# Patient Record
Sex: FEMALE | Race: WHITE | Employment: UNEMPLOYED | URBAN - METROPOLITAN AREA
[De-identification: names, ages, dates, MRNs, and addresses within clinical notes are randomized per-mention and may not be internally consistent; named-entity substitution may affect disease eponyms.]

---

## 2017-01-02 ENCOUNTER — OFFICE VISIT (OUTPATIENT)
Dept: FAMILY MEDICINE CLINIC | Age: 42
End: 2017-01-02

## 2017-01-02 VITALS
SYSTOLIC BLOOD PRESSURE: 109 MMHG | WEIGHT: 244 LBS | OXYGEN SATURATION: 99 % | HEIGHT: 67 IN | RESPIRATION RATE: 16 BRPM | DIASTOLIC BLOOD PRESSURE: 73 MMHG | HEART RATE: 87 BPM | TEMPERATURE: 98 F | BODY MASS INDEX: 38.3 KG/M2

## 2017-01-02 DIAGNOSIS — N92.1 MENORRHAGIA WITH IRREGULAR CYCLE: ICD-10-CM

## 2017-01-02 DIAGNOSIS — L68.0 HIRSUTISM: ICD-10-CM

## 2017-01-02 DIAGNOSIS — N97.0 ANOVULATORY: ICD-10-CM

## 2017-01-02 DIAGNOSIS — E03.9 ACQUIRED HYPOTHYROIDISM: ICD-10-CM

## 2017-01-02 DIAGNOSIS — N92.6 IRREGULAR BLEEDING: Primary | ICD-10-CM

## 2017-01-02 NOTE — PATIENT INSTRUCTIONS
Vaginal Bleeding in Nonpregnant Women: Care Instructions  Your Care Instructions    Many women have bleeding or spotting between periods. Lots of things can cause it. You may bleed because of hormone problems, stress, or ovulation. Fibroids and IUDs (intrauterine devices) can also cause bleeding. If your bleeding or spotting is caused by one of these things and is not heavy or doesn't happen often, you probably don't need to worry. But in rare cases, infection, cancer, or other serious conditions can cause bleeding. So you may need more tests to find the cause of your bleeding. The doctor has checked you carefully, but problems can develop later. If you notice any problems or new symptoms, get medical treatment right away. Follow-up care is a key part of your treatment and safety. Be sure to make and go to all appointments, and call your doctor if you are having problems. It's also a good idea to know your test results and keep a list of the medicines you take. How can you care for yourself at home? · Take pain medicines exactly as directed. ¨ If the doctor gave you a prescription medicine for pain, take it as prescribed. ¨ If you are not taking a prescription pain medicine, ask your doctor if you can take an over-the-counter medicine. Do not take aspirin, which may make bleeding worse. · If your doctor prescribed birth control pills for your bleeding, take them as directed. · Eat foods that are high in iron and vitamin C. Foods high in iron include red meat, shellfish, eggs, beans, and leafy green vegetables. Foods high in vitamin C include citrus fruits, tomatoes, and broccoli. Ask your doctor if you need to take iron pills or a multivitamin. · Ask your doctor when it is okay to have sex. When should you call for help? Call 911 anytime you think you may need emergency care. For example, call if:  · You passed out (lost consciousness). · You have sudden, severe pain in your belly or pelvis.   Call your doctor now or seek immediate medical care if:  · You have severe vaginal bleeding. You are soaking through your usual pads or tampons each hour for 2 or more hours. · You are dizzy or lightheaded or you feel like you may faint. · You have new belly or pelvic pain. · You have a fever. · Your bleeding gets worse. Watch closely for changes in your health, and be sure to contact your doctor if:  · You have new or worse vaginal discharge. · You do not get better as expected. Where can you learn more? Go to http://maury-manny.info/. Enter H723 in the search box to learn more about \"Vaginal Bleeding in Nonpregnant Women: Care Instructions. \"  Current as of: February 25, 2016  Content Version: 11.1  © 5512-8704 [employer], [employer]. Care instructions adapted under license by [employer] (which disclaims liability or warranty for this information). If you have questions about a medical condition or this instruction, always ask your healthcare professional. [address] any warranty or liability for your use of this information.

## 2017-01-02 NOTE — PROGRESS NOTES
[address]  [address] [address] Jair Pablo   [phone]             Date of visit: 1/2/2017   Subjective:      History obtained from:  the patient. Jj Rosado is a 46 y.o. female who presents today for period for 6 weeks. Yesterday was the first day in 6 weeks she didn't bleed, and today having cramping like she is going to bleed more  Had heavy periods after her son was born, 4 years ago  This bleeding has been quite heavy at times  Still on PNV daily  Then went on fertiility meds to have daughter, who is 11 mo old  Sure she hasn't been pregnant, hasn't had intercourse since June    Mom had fibroids and PCOS    We had started Ms. Mendoza on synthroid 6 weeks ago, low thyroid runs in the family  Feels more energetic on it  However, feeling more tired last copule weeks of heavy leeding    Patient Active Problem List    Diagnosis Date Noted    Acquired hypothyroidism 11/23/2016    Obesity (BMI 30-39.9) 10/06/2016    Change in multiple nevi 10/06/2016    Family history of thyroid disease in mother 10/06/2016    Cervical dysplasia 12/10/2015    Abnormal Pap smear of cervix 08/11/2015    HPV test positive 05/22/2014    Anovulatory 06/24/2010     Current Outpatient Prescriptions   Medication Sig Dispense Refill    levothyroxine (SYNTHROID) 125 mcg tablet Take 1 Tab by mouth Daily (before breakfast). 30 Tab 2    PNV no.24-iron-folic acid-dha (PRENATAL DHA+COMPLETE PRENATAL) -300 mg-mcg-mg cmpk Take  by mouth. Allergies   Allergen Reactions    Hydrocodone Shortness of Breath and Vertigo    Omeprazole Hives    Quibron [Theophylline-Guaifenesin] Other (comments)     Hallucinations and violent.      Tape [Adhesive] Rash     Past Medical History   Diagnosis Date    Abnormal Pap smear      colposcopy, wnl since    Asthma      stress induced(last in january)    Group B Streptococcus carrier, +RV culture, currently pregnant 1/20/16    Infertility IUI to get pregnant    Trauma      broken R ankle, R foot, L arm     Past Surgical History   Procedure Laterality Date    Pr  delivery only  12    Hx colposcopy  3/17/16    Hx heent  1995     DENTAL    Hx gyn       Csection X2    Ckc, aka cold knife cone       Family History   Problem Relation Age of Onset    Thyroid Disease Mother      also in maternal aunt    Cancer Maternal Grandmother     Cancer Paternal Grandmother      brain tumor    Anesth Problems Neg Hx      Social History   Substance Use Topics    Smoking status: Never Smoker    Smokeless tobacco: Never Used    Alcohol use No      Social History     Social History Narrative        Review of Systems  Card: denies chest pain  Pulm: denies shortness of breath       Objective:     Vitals:    17 0901   BP: 109/73   Pulse: 87   Resp: 16   Temp: 98 °F (36.7 °C)   TempSrc: Oral   SpO2: 99%   Weight: 244 lb (110.7 kg)   Height: 5' 7\" (1.702 m)     Body mass index is 38.22 kg/(m^2). General: stated age, obese and in NAD  Neck: supple, symmetrical, trachea midline, no adenopathy and thyroid: not enlarged, symmetric, no tenderness/mass/nodules  Lungs:  clear to auscultation w/o rales, rhonchi, wheezes w/normal effort and no use of accessory muscles of respiration   Heart: regular rate and rhythm, S1, S2 normal, no murmur, click, rub or gallop  Abdomen: soft, nontender  Ext:  No edema noted.    Lymph: no cervical adenopathy appreciated  Skin:  Mild acne on back (few small closed comedones and 1 cyst), mild hair growth on chin, diffuse hair thinning frontally  Psych: alert and oriented to person, place, time and situation and Speech: appropriate quality, quantity and organization of sentences      Lab Results   Component Value Date/Time    Cholesterol, total 177 2010 11:47 AM    HDL Cholesterol 41 2010 11:47 AM    LDL, calculated 103 2010 11:47 AM    VLDL, calculated 33 2010 11:47 AM    Triglyceride 166 08/12/2010 11:47 AM     Lab Results   Component Value Date/Time    Sodium 140 04/06/2016 02:51 PM    Potassium 4.0 04/06/2016 02:51 PM    Chloride 105 04/06/2016 02:51 PM    CO2 27 04/06/2016 02:51 PM    Anion gap 8 04/06/2016 02:51 PM    Glucose 87 04/06/2016 02:51 PM    BUN 8 04/06/2016 02:51 PM    Creatinine 0.98 04/06/2016 02:51 PM    BUN/Creatinine ratio 8 04/06/2016 02:51 PM    GFR est AA >60 04/06/2016 02:51 PM    GFR est non-AA >60 04/06/2016 02:51 PM    Calcium 9.0 04/06/2016 02:51 PM    Bilirubin, total 0.4 04/06/2016 02:51 PM    ALT 48 04/06/2016 02:51 PM    AST 23 04/06/2016 02:51 PM    Alk. phosphatase 145 04/06/2016 02:51 PM    Protein, total 8.0 04/06/2016 02:51 PM    Albumin 4.0 04/06/2016 02:51 PM    Globulin 4.0 04/06/2016 02:51 PM    A-G Ratio 1.0 04/06/2016 02:51 PM     Lab Results   Component Value Date/Time    WBC 6.0 04/06/2016 02:51 PM    Hemoglobin (POC) 13.6 03/31/2014 10:19 AM    HGB 13.4 04/06/2016 02:51 PM    HCT 41.4 04/06/2016 02:51 PM    PLATELET 381 90/38/4325 02:51 PM    MCV 87.3 04/06/2016 02:51 PM    Hgb, External 14.0 07/17/2015    Hct, External 41.5 07/17/2015    Platelet cnt., External 233 07/17/2015     Lab Results   Component Value Date/Time    TSH 15.540 11/17/2016 04:04 PM    TSH 1.330 11/01/2012 11:20 AM       Assessment/Plan:       ICD-10-CM ICD-9-CM    1. Irregular bleeding N92.6 626.4 CBC WITH AUTOMATED DIFF   2. Acquired hypothyroidism E03.9 244.9 TSH 3RD GENERATION      THYROID PEROXIDASE (TPO) AB      THYROGLOBULIN AB   3. Menorrhagia with irregular cycle N92.1 626.2 US PELV NON OBS   4.  Anovulatory N97.0 628.0 TESTOSTERONE, FREE   5. Hirsutism L68.0 704.1 17-OH PROGESTERONE LCMS       Orders Placed This Encounter    US PELV NON OBS    TSH 3RD GENERATION    THYROID PEROXIDASE (TPO) AB    THYROGLOBULIN AB    CBC WITH AUTOMATED DIFF    TESTOSTERONE, FREE    17-OH PROGESTERONE LCMS     Probably irregular anovulatory bleeding but will check ultrasound and consider EMB if bleeding continues  Declines hormonal pills now but may want to go on something to regulate bleeding. Feeling better on thyroid pills, check tsh and antibody levels    Due to anovulation, hirsutism eunice lcheck testosterone studies    Discussed the diagnosis and plan and she expressed understanding. Follow-up Disposition:  Return in about 3 months (around 4/2/2017).     Marii Meyer MD

## 2017-01-02 NOTE — MR AVS SNAPSHOT
Visit Information Date & Time Provider Department Dept. Phone Encounter #  
 1/2/2017  9:00 AM Jacek Main, [address] [facility] [phone] [id number] Upcoming Health Maintenance Date Due INFLUENZA AGE 9 TO ADULT 8/1/2016 PAP AKA CERVICAL CYTOLOGY 10/6/2018 DTaP/Tdap/Td series (3 - Td) 12/2/2025 Allergies as of 1/2/2017  Review Complete On: 1/2/2017 By: Jacek Main MD  
  
 Severity Noted Reaction Type Reactions Hydrocodone  07/21/2011    Shortness of Breath, Vertigo Omeprazole  03/31/2014    Hives Kareem Bilis [Theophylline-guaifenesin]  06/24/2010    Other (comments) Hallucinations and violent. Tape [Adhesive] Low 04/06/2016   Topical Rash Current Immunizations  Reviewed on 3/2/2016 Name Date Influenza Vaccine (Quad) PF 9/30/2015 TDAP Vaccine 11/1/2012 Tdap 12/2/2015 10:23 AM  
  
 Not reviewed this visit You Were Diagnosed With   
  
 Codes Comments Irregular bleeding    -  Primary ICD-10-CM: N92.6 ICD-9-CM: 626.4 Acquired hypothyroidism     ICD-10-CM: E03.9 ICD-9-CM: 244.9 Menorrhagia with irregular cycle     ICD-10-CM: N92.1 ICD-9-CM: 626.2 Anovulatory     ICD-10-CM: N97.0 ICD-9-CM: 628.0 Vitals BP Pulse Temp Resp Height(growth percentile) Weight(growth percentile) 109/73 87 98 °F (36.7 °C) (Oral) 16 5' 7\" (1.702 m) 244 lb (110.7 kg) LMP SpO2 BMI OB Status Smoking Status 11/07/2016 99% 38.22 kg/m2 Having regular periods Never Smoker Vitals History BMI and BSA Data Body Mass Index Body Surface Area  
 38.22 kg/m 2 2.29 m 2 Preferred Pharmacy Pharmacy Name Phone [id number], [address] [facility] [phone] Your Updated Medication List  
  
   
This list is accurate as of: 1/2/17  9:18 AM.  Always use your most recent med list.  
  
  
  
  
 levothyroxine 125 mcg tablet Commonly known as:  SYNTHROID  
 Take 1 Tab by mouth Daily (before breakfast). PRENATAL DHA+COMPLETE PRENATAL -300 mg-mcg-mg Cmpk Generic drug:  PNV no.24-iron-folic acid-dha Take  by mouth. To-Do List   
 01/02/2017 Imaging:  US PELV NON OBS Patient Instructions Vaginal Bleeding in Nonpregnant Women: Care Instructions Your Care Instructions Many women have bleeding or spotting between periods. Lots of things can cause it. You may bleed because of hormone problems, stress, or ovulation. Fibroids and IUDs (intrauterine devices) can also cause bleeding. If your bleeding or spotting is caused by one of these things and is not heavy or doesn't happen often, you probably don't need to worry. But in rare cases, infection, cancer, or other serious conditions can cause bleeding. So you may need more tests to find the cause of your bleeding. The doctor has checked you carefully, but problems can develop later. If you notice any problems or new symptoms, get medical treatment right away. Follow-up care is a key part of your treatment and safety. Be sure to make and go to all appointments, and call your doctor if you are having problems. It's also a good idea to know your test results and keep a list of the medicines you take. How can you care for yourself at home? · Take pain medicines exactly as directed. ¨ If the doctor gave you a prescription medicine for pain, take it as prescribed. ¨ If you are not taking a prescription pain medicine, ask your doctor if you can take an over-the-counter medicine. Do not take aspirin, which may make bleeding worse. · If your doctor prescribed birth control pills for your bleeding, take them as directed. · Eat foods that are high in iron and vitamin C. Foods high in iron include red meat, shellfish, eggs, beans, and leafy green vegetables. Foods high in vitamin C include citrus fruits, tomatoes, and broccoli.  Ask your doctor if you need to take iron pills or a multivitamin. · Ask your doctor when it is okay to have sex. When should you call for help? Call 911 anytime you think you may need emergency care. For example, call if: 
· You passed out (lost consciousness). · You have sudden, severe pain in your belly or pelvis. Call your doctor now or seek immediate medical care if: 
· You have severe vaginal bleeding. You are soaking through your usual pads or tampons each hour for 2 or more hours. · You are dizzy or lightheaded or you feel like you may faint. · You have new belly or pelvic pain. · You have a fever. · Your bleeding gets worse. Watch closely for changes in your health, and be sure to contact your doctor if: 
· You have new or worse vaginal discharge. · You do not get better as expected. Where can you learn more? Go to http://maury-manny.info/. Enter L729 in the search box to learn more about \"Vaginal Bleeding in Nonpregnant Women: Care Instructions. \" Current as of: February 25, 2016 Content Version: 11.1 © 4628-7708 [employer]. Care instructions adapted under license by [employer] (which disclaims liability or warranty for this information). If you have questions about a medical condition or this instruction, always ask your healthcare professional. [address] any warranty or liability for your use of this information. Introducing [facility]! [employer] introduces [employer] patient portal. Now you can access parts of your medical record, email your doctor's office, and request medication refills online. 1. In your internet browser, go to [email or web address] 2. Click on the First Time User? Click Here link in the Sign In box. You will see the New Member Sign Up page. 3. Enter your Bookioo Access Code exactly as it appears below.  You will not need to use this code after youve completed the sign-up process. If you do not sign up before the expiration date, you must request a new code. · [employer] Access Code: [email or web address] Expires: 1/4/2017 12:33 PM 
 
4. Enter the last four digits of your Social Security Number (xxxx) and Date of Birth (mm/dd/yyyy) as indicated and click Submit. You will be taken to the next sign-up page. 5. Create a [employer] ID. This will be your [employer] login ID and cannot be changed, so think of one that is secure and easy to remember. 6. Create a [employer] password. You can change your password at any time. 7. Enter your Password Reset Question and Answer. This can be used at a later time if you forget your password. 8. Enter your e-mail address. You will receive e-mail notification when new information is available in [address]. 9. Click Sign Up. You can now view and download portions of your medical record. 10. Click the Download Summary menu link to download a portable copy of your medical information. If you have questions, please visit the Frequently Asked Questions section of the [employer] website. Remember, [employer] is NOT to be used for urgent needs. For medical emergencies, dial 911. Now available from your iPhone and Android! Please provide this summary of care documentation to your next provider. Your primary care clinician is listed as [address] [place] [address]. If you have any questions after today's visit, please call [phone].

## 2017-01-05 ENCOUNTER — TELEPHONE (OUTPATIENT)
Dept: FAMILY MEDICINE CLINIC | Age: 42
End: 2017-01-05

## 2017-01-05 LAB
17OHP SERPL-MCNC: 77 NG/DL
BASOPHILS # BLD AUTO: 0 X10E3/UL (ref 0–0.2)
BASOPHILS NFR BLD AUTO: 0 %
EOSINOPHIL # BLD AUTO: 0.2 X10E3/UL (ref 0–0.4)
EOSINOPHIL NFR BLD AUTO: 4 %
ERYTHROCYTE [DISTWIDTH] IN BLOOD BY AUTOMATED COUNT: 13.1 % (ref 12.3–15.4)
HCT VFR BLD AUTO: 40.7 % (ref 34–46.6)
HGB BLD-MCNC: 13.7 G/DL (ref 11.1–15.9)
IMM GRANULOCYTES # BLD: 0 X10E3/UL (ref 0–0.1)
IMM GRANULOCYTES NFR BLD: 0 %
LYMPHOCYTES # BLD AUTO: 1 X10E3/UL (ref 0.7–3.1)
LYMPHOCYTES NFR BLD AUTO: 19 %
MCH RBC QN AUTO: 29.4 PG (ref 26.6–33)
MCHC RBC AUTO-ENTMCNC: 33.7 G/DL (ref 31.5–35.7)
MCV RBC AUTO: 87 FL (ref 79–97)
MONOCYTES # BLD AUTO: 0.5 X10E3/UL (ref 0.1–0.9)
MONOCYTES NFR BLD AUTO: 9 %
NEUTROPHILS # BLD AUTO: 3.6 X10E3/UL (ref 1.4–7)
NEUTROPHILS NFR BLD AUTO: 68 %
PLATELET # BLD AUTO: 230 X10E3/UL (ref 150–379)
RBC # BLD AUTO: 4.66 X10E6/UL (ref 3.77–5.28)
TESTOST FREE SERPL-MCNC: 5.3 PG/ML (ref 0–4.2)
THYROGLOB AB SERPL-ACNC: >2250 IU/ML (ref 0–0.9)
THYROPEROXIDASE AB SERPL-ACNC: 330 IU/ML (ref 0–34)
TSH SERPL DL<=0.005 MIU/L-ACNC: 0.21 UIU/ML (ref 0.45–4.5)
WBC # BLD AUTO: 5.4 X10E3/UL (ref 3.4–10.8)

## 2017-01-05 RX ORDER — LEVOTHYROXINE SODIUM 112 UG/1
112 TABLET ORAL
Qty: 90 TAB | Refills: 1 | Status: SHIPPED | OUTPATIENT
Start: 2017-01-05 | End: 2017-06-30 | Stop reason: SDUPTHER

## 2017-01-05 NOTE — TELEPHONE ENCOUNTER
Spoke with pt about labs, need to cut back thyroid dose. 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 Androgen levels unremarkable, will send results in mail. Will call her when I get ultrasound next week. She is spotting a little now. She expressed understanding.

## 2017-01-11 ENCOUNTER — HOSPITAL ENCOUNTER (OUTPATIENT)
Dept: ULTRASOUND IMAGING | Age: 42
Discharge: HOME | End: 2017-01-11
Attending: FAMILY MEDICINE
Payer: COMMERCIAL

## 2017-01-11 DIAGNOSIS — N92.1 MENORRHAGIA WITH IRREGULAR CYCLE: ICD-10-CM

## 2017-01-11 DIAGNOSIS — N92.1 METRORRHAGIA: ICD-10-CM

## 2017-01-11 PROCEDURE — 76830 TRANSVAGINAL US NON-OB: CPT

## 2017-01-11 PROCEDURE — 76856 US EXAM PELVIC COMPLETE: CPT

## 2017-01-12 ENCOUNTER — TELEPHONE (OUTPATIENT)
Dept: FAMILY MEDICINE CLINIC | Age: 42
End: 2017-01-12

## 2017-01-12 PROBLEM — E28.2 PCOS (POLYCYSTIC OVARIAN SYNDROME): Status: ACTIVE | Noted: 2017-01-12

## 2017-01-12 RX ORDER — METFORMIN HYDROCHLORIDE 500 MG/1
500 TABLET ORAL 2 TIMES DAILY WITH MEALS
Qty: 60 TAB | Refills: 3 | Status: SHIPPED | OUTPATIENT
Start: 2017-01-12 | End: 2017-07-25

## 2017-01-12 NOTE — TELEPHONE ENCOUNTER
Spoke with patient, based on ultrasound and clinical findings I do think she has PCOS. She would really prefer to avoid hormones. Did start another period but hoping it won't be as bad. Periods have been irregular this year, but she had a baby in April and is still nursing. We discussed that we should do endometrial biopsy if she has persistent abnormal bleeding, but I think ok to hold off a few more months. Will try the metformin (discussed how to go up on it gradually to avoid GI effects), weight loss. She will follow up in a couple of months about her thyroid and let us know then. Will also call sooner if this period doesn't stop so we can give her provera.  Thankfully her blood counts were normal, so I am not too concerned about her becoming anemic

## 2017-01-23 ENCOUNTER — TELEPHONE (OUTPATIENT)
Dept: FAMILY MEDICINE CLINIC | Age: 42
End: 2017-01-23

## 2017-01-23 NOTE — TELEPHONE ENCOUNTER
Per call from patient, Dr. Hieu Jain told her to call to request medication to stop her period.     Questions call patient at ,[phone]

## 2017-01-24 RX ORDER — MEDROXYPROGESTERONE ACETATE 5 MG/1
5 TABLET ORAL DAILY
Qty: 10 TAB | Refills: 0 | Status: SHIPPED | OUTPATIENT
Start: 2017-01-24 | End: 2017-02-03

## 2017-01-24 NOTE — TELEPHONE ENCOUNTER
Please let her know I sent in provera hormones to help stop her period for now. She can let me know if they don't work.

## 2017-02-07 NOTE — TELEPHONE ENCOUNTER
Patient notes the medication that was prescribed to stop her period only worked for 2 days, then became even heavier. Patient states she's had a period since November. Patient asking if there is something else that can be called in. Patient scheduled appt with Dr. Alex Mejia for 2/22/17 and wanted to wait to see Dr. Alex Mejia. Sooner appt was offered but declined.

## 2017-02-08 ENCOUNTER — OFFICE VISIT (OUTPATIENT)
Dept: FAMILY MEDICINE CLINIC | Age: 42
End: 2017-02-08

## 2017-02-08 VITALS
HEIGHT: 67 IN | HEART RATE: 100 BPM | OXYGEN SATURATION: 100 % | WEIGHT: 245 LBS | SYSTOLIC BLOOD PRESSURE: 111 MMHG | TEMPERATURE: 98 F | BODY MASS INDEX: 38.45 KG/M2 | DIASTOLIC BLOOD PRESSURE: 75 MMHG | RESPIRATION RATE: 16 BRPM

## 2017-02-08 DIAGNOSIS — Z28.21 INFLUENZA VACCINATION DECLINED BY PATIENT: ICD-10-CM

## 2017-02-08 DIAGNOSIS — E06.3 HASHIMOTO'S THYROIDITIS: ICD-10-CM

## 2017-02-08 DIAGNOSIS — N93.8 DUB (DYSFUNCTIONAL UTERINE BLEEDING): Primary | ICD-10-CM

## 2017-02-08 NOTE — PROGRESS NOTES
47 yo female with  has a past medical history of Abnormal Pap smear; Asthma; Group B Streptococcus carrier, +RV culture, currently pregnant (16); Infertility; and Trauma. She also has no past medical history of Abuse; Acquired hypothyroidism; Anemia NEC; Complication of anesthesia; Diabetes mellitus; Essential hypertension; Genital herpes, unspecified; Heart abnormalities; Herpes gestationis; Herpes simplex without mention of complication; Human immunodeficiency virus (HIV) disease ([facility] OTHER MEDICAL; Kidney disease; Liver disease; Phlebitis and thrombophlebitis of unspecified site; Postpartum depression; Psychiatric problem; Rhesus isoimmunization unspecified as to episode of care in pregnancy; Sickle-cell disease, unspecified; Systemic lupus erythematosus ([facility] Unspecified breast disorder; Unspecified diseases of blood and blood-forming organs; or Unspecified epilepsy without mention of intractable epilepsy ([facility] presenting for DUB. She states in early Nov she started having DUB. Last \"normal\" menses was in  prior to pregnancy. She was on fertility treatments prior to conception as well to help regulate her cycles at that time. She was also diagnosed with PCOS and is on metformin. She has been currently bleeding since Nov except for 2 days. Denies any lightheadedness. Denies any chest pain, dyspnea. +mild fatigue. Denies any syncope. Hx of PPH following  delivery. Denies any pos FHx. She has been on OCPs over 10 yrs ago. Denies any known complications. Was discontinued due to pregnancy. She has had an TV us with normal endometrial stripe and uterus with PCOS. No fibroids. She is still nursing her daughter and she is almost a year old.    Past Medical History   Diagnosis Date    Abnormal Pap smear      colposcopy, wnl since    Asthma      stress induced(last in january)    Group B Streptococcus carrier, +RV culture, currently pregnant 16    Infertility      IUI to get pregnant    Trauma      broken R ankle, R foot, L arm     Past Surgical History   Procedure Laterality Date    Pr  delivery only  12    Hx colposcopy  3/17/16    Hx heent       DENTAL    Hx gyn       Csection X2    Ckc, aka cold knife cone       Family History   Problem Relation Age of Onset    Thyroid Disease Mother      also in maternal aunt    Cancer Maternal Grandmother     Cancer Paternal Grandmother      brain tumor    Anesth Problems Neg Hx      Social History     Social History    Marital status:      Spouse name: N/A    Number of children: N/A    Years of education: N/A     Occupational History    Not on file. Social History Main Topics    Smoking status: Never Smoker    Smokeless tobacco: Never Used    Alcohol use No    Drug use: No    Sexual activity: Not Currently     Partners: Male     Birth control/ protection: None     Other Topics Concern    Not on file     Social History Narrative       Current Outpatient Prescriptions:     metFORMIN (GLUCOPHAGE) 500 mg tablet, Take 1 Tab by mouth two (2) times daily (with meals). , Disp: 60 Tab, Rfl: 3    levothyroxine (SYNTHROID) 112 mcg tablet, Take 1 Tab by mouth Daily (before breakfast). Lower dose, Disp: 90 Tab, Rfl: 1    PNV no.24-iron-folic acid-dha (PRENATAL DHA+COMPLETE PRENATAL) -300 mg-mcg-mg cmpk, Take  by mouth., Disp: , Rfl:   Allergies   Allergen Reactions    Hydrocodone Shortness of Breath and Vertigo    Omeprazole Hives    Quibron [Theophylline-Guaifenesin] Other (comments)     Hallucinations and violent.  Tape [Adhesive] Rash       ROS: Pertinent ROS performed and negative except as mentioned in the HPI.      Visit Vitals    /75 (BP 1 Location: Left arm, BP Patient Position: Sitting)    Pulse 100    Temp 98 °F (36.7 °C) (Oral)    Resp 16    Ht 5' 7\" (1.702 m)    Wt 245 lb (111.1 kg)    SpO2 100%    BMI 38.37 kg/m2     PE:   General appearance - alert, well appearing, and in no distress and overweight  Chest - clear to auscultation, no wheezes, rales or rhonchi, symmetric air entry  Heart - normal rate, regular rhythm, normal S1, S2, no murmurs, rubs, clicks or gallops  Abdomen - soft, nontender, nondistended, no masses or organomegaly  bowel sounds normal  A/P:  DUB with normal pelvic ultrasound. Normal pap. Discussed options including IUD, OCPs, depo, and minipills. Desires to discuss with . Also discussed endometrial ablation as well. Fanta was seen today for irregular menses. Diagnoses and all orders for this visit:    DUB (dysfunctional uterine bleeding)  -     PROLACTIN  -     FSH AND LH  -     CBC WITH AUTOMATED DIFF  -     PROTHROMBIN TIME + INR  -     VON WILLEBRAND FACTOR (VWF) SCREENING PROFILE (ESOTERIX)  -     HEPATIC FUNCTION PANEL    Hashimoto's thyroiditis    Influenza vaccination declined by patient        I have discussed the diagnosis with the patient and the intended plan as seen in the above orders. The patient has received an after-visit summary and questions were answered concerning future plans. I have discussed medication side effects and warnings with the patient as well. Informed pt to return to the office if symptoms worsen or if new symptoms arise.

## 2017-02-08 NOTE — PROGRESS NOTES
Chief Complaint   Patient presents with    Irregular Menses     pt states starting in November 1. Have you been to the ER, urgent care clinic since your last visit? Hospitalized since your last visit? No    2. Have you seen or consulted any other health care providers outside of the [facility] since your last visit? Include any pap smears or colon screening.  No

## 2017-02-08 NOTE — LETTER
2/14/2017 9:09 AM 
 
Ms. Brandon Stewart [address] [id number] Dear Brandon Stewart: 
 
Please find your most recent results below. Resulted Orders PROLACTIN Result Value Ref Range Prolactin 17.5 4.8 - 23.3 ng/mL Narrative Performed at:  [address]  [id number] : Roland Amado MD, Phone:  [phone] [facility] Result Value Ref Range Luteinizing hormone 9.6 mIU/mL Comment:  
                       Adult Female: Follicular phase      2.4 -  12.6 Ovulation phase      14.0 -  95.6 Luteal phase          1.0 -  11.4 Postmenopausal        7.7 -  58.5 FSH 5.3 mIU/mL Comment:  
                       Adult Female: Follicular phase      3.5 -  12.5 Ovulation phase       4.7 -  21.5 Luteal phase          1.7 -   7.7 Postmenopausal       25.8 - 134.8 Narrative Performed at:  [address]  [id number] : Roland Amado MD, Phone:  [phone] CBC WITH AUTOMATED DIFF Result Value Ref Range WBC 6.8 3.4 - 10.8 x10E3/uL  
 RBC 4.04 3.77 - 5.28 x10E6/uL HGB 11.8 11.1 - 15.9 g/dL HCT 35.8 34.0 - 46.6 % MCV 89 79 - 97 fL  
 MCH 29.2 26.6 - 33.0 pg  
 MCHC 33.0 31.5 - 35.7 g/dL  
 RDW 13.3 12.3 - 15.4 % PLATELET 369 888 - 321 x10E3/uL NEUTROPHILS 64 % Lymphocytes 22 % MONOCYTES 10 % EOSINOPHILS 4 % BASOPHILS 0 %  
 ABS. NEUTROPHILS 4.3 1.4 - 7.0 x10E3/uL Abs Lymphocytes 1.5 0.7 - 3.1 x10E3/uL  
 ABS. MONOCYTES 0.7 0.1 - 0.9 x10E3/uL  
 ABS. EOSINOPHILS 0.3 0.0 - 0.4 x10E3/uL  
 ABS. BASOPHILS 0.0 0.0 - 0.2 x10E3/uL IMMATURE GRANULOCYTES 0 %  
 ABS. IMM. GRANS. 0.0 0.0 - 0.1 x10E3/uL Narrative Performed at:  [address]  [id number] : Matilda Sinclair MD, Phone:  [phone] PROTHROMBIN TIME + INR Result Value Ref Range INR 1.0 0.8 - 1.2 Comment:  
   Reference interval is for non-anticoagulated patients. Suggested INR therapeutic range for Vitamin K 
antagonist therapy: 
   Standard Dose (moderate intensity 
                  therapeutic range):       2.0 - 3.0 Higher intensity therapeutic range       2.5 - 3.5 Prothrombin time 10.2 9.1 - 12.0 sec Narrative Performed at:  [address]  [id number] : Matilda Sinclair MD, Phone:  [phone] VON WILLEBRAND FACTOR ([facility]) SCREENING PROFILE ([facility]) Result Value Ref Range aPTT 26.0 sec Comment: This test has not been validated for monitoring 
unfractionated heparin therapy. aPTT-based therapeutic 
ranges for unfractionated heparin therapy have not been 
established. Consider ordering Heparin anti-Xa (unfractionated). Reference Range: 
18 years and older: 22.9 - 30.2 Factor VIII Activity 155 % Comment:  
   Reference Range: 
57 - 163 
  
 von Willebrand Factor Activity 134 % Comment:  
   Reference Range: 
50 - 200 
  
 von Willebrand Factor Antigen 155 % Comment:  
   Reference Range: 
50 - 200 This test was developed and its performance characteristics 
determined by [facility]. It has not been cleared or approved 
by the Food and Drug Administration. Narrative Performed at:  02 - [facility] [address] [place] [address]  [id number] : Félix Montero MD, Phone:  [phone] HEPATIC FUNCTION PANEL Result Value Ref Range Protein, total 7.1 6.0 - 8.5 g/dL Albumin 4.2 3.5 - 5.5 g/dL Bilirubin, total 0.2 0.0 - 1.2 mg/dL Bilirubin, direct 0.09 0.00 - 0.40 mg/dL Comment: **Please note reference interval change** Alk. phosphatase 99 39 - 117 IU/L  
 AST (SGOT) 29 0 - 40 IU/L  
 ALT (SGPT) 50 (H) 0 - 32 IU/L Narrative Performed at:  [address]  [id number] : Bethany Horvath MD, Phone:  [phone] RECOMMENDATIONS: 
All of your labs are normal except one of your liver function tests.  All of your hormone levels were normal and your blood clotting function is also normal.  One of your liver function tests were elevated. Likely incidental. Nonetheless, please abstain from all alcohol. Please use tylenol with discretion and we can check this level again in 3-6 months. Please call me if you have any questions: [phone] Sincerely, Maite Dunn MD

## 2017-02-08 NOTE — MR AVS SNAPSHOT
Visit Information Date & Time Provider Department Dept. Phone Encounter #  
 2/8/2017  3:00 PM Carmina Phoenix, Iwona Baker [phone] [id number] Follow-up Instructions Return if symptoms worsen or fail to improve. Upcoming Health Maintenance Date Due INFLUENZA AGE 9 TO ADULT 8/1/2016 PAP AKA CERVICAL CYTOLOGY 10/6/2018 DTaP/Tdap/Td series (3 - Td) 12/2/2025 Allergies as of 2/8/2017  Review Complete On: 2/8/2017 By: Carmina Phoenix, MD  
  
 Severity Noted Reaction Type Reactions Hydrocodone  07/21/2011    Shortness of Breath, Vertigo Omeprazole  03/31/2014    Hives Vivi Manner [Theophylline-guaifenesin]  06/24/2010    Other (comments) Hallucinations and violent. Tape [Adhesive] Low 04/06/2016   Topical Rash Current Immunizations  Reviewed on 3/2/2016 Name Date Influenza Vaccine (Quad) PF 9/30/2015 TDAP Vaccine 11/1/2012 Tdap 12/2/2015 10:23 AM  
  
 Not reviewed this visit You Were Diagnosed With   
  
 Codes Comments Hashimoto's thyroiditis    -  Primary ICD-10-CM: E06.3 ICD-9-CM: 245.2 DUB (dysfunctional uterine bleeding)     ICD-10-CM: N93.8 ICD-9-CM: 626.8 Influenza vaccination declined by patient     ICD-10-CM: Z28.21 ICD-9-CM: V64.06 Vitals BP Pulse Temp Resp Height(growth percentile) Weight(growth percentile) 111/75 (BP 1 Location: Left arm, BP Patient Position: Sitting) 100 98 °F (36.7 °C) (Oral) 16 5' 7\" (1.702 m) 245 lb (111.1 kg) LMP SpO2 BMI OB Status Smoking Status 11/07/2016 (Exact Date) 100% 38.37 kg/m2 Having regular periods Never Smoker BMI and BSA Data Body Mass Index Body Surface Area  
 38.37 kg/m 2 2.29 m 2 Preferred Pharmacy Pharmacy Name Phone [id number], [address] [facility] [phone] Your Updated Medication List  
  
   
 This list is accurate as of: 2/8/17  3:34 PM.  Always use your most recent med list.  
  
  
  
  
 levothyroxine 112 mcg tablet Commonly known as:  SYNTHROID Take 1 Tab by mouth Daily (before breakfast). Lower dose  
  
 metFORMIN 500 mg tablet Commonly known as:  GLUCOPHAGE Take 1 Tab by mouth two (2) times daily (with meals). PRENATAL DHA+COMPLETE PRENATAL -300 mg-mcg-mg Cmpk Generic drug:  PNV no.24-iron-folic acid-dha Take  by mouth. Follow-up Instructions Return if symptoms worsen or fail to improve. Patient Instructions Hypothyroidism: Care Instructions Your Care Instructions You have hypothyroidism, which means that your body is not making enough thyroid hormone. This hormone helps your body use energy. If your thyroid level is low, you may feel tired, be constipated, have an increase in your blood pressure, or have dry skin or memory problems. You may also get cold easily, even when it is warm. Women with low thyroid levels may have heavy menstrual periods. A blood test to find your thyroid-stimulating hormone (TSH) level is used to check for hypothyroidism. A high TSH level may mean that you have low thyroid. When your body is not making enough thyroid hormone, TSH levels rise in an effort to make the body produce more. The treatment for hypothyroidism is to take thyroid hormone pills. You should start to feel better in 1 to 2 weeks. But it can take several months to see changes in the TSH level. You will need regular visits with your doctor to make sure you have the right dose of medicine. Most people need treatment for the rest of their lives. You will need to see your doctor regularly to have blood tests and to make sure you are doing well. Follow-up care is a key part of your treatment and safety.  Be sure to make and go to all appointments, and call your doctor if you are having problems. Its also a good idea to know your test results and keep a list of the medicines you take. How can you care for yourself at home? · Take your thyroid hormone medicine exactly as prescribed. Call your doctor if you think you are having a problem with your medicine. Most people do not have side effects if they take the right amount of medicine regularly. ¨ Take the medicine 30 minutes before breakfast, and do not take it with calcium, vitamins, or iron. ¨ Do not take extra doses of your thyroid medicine. It will not help you get better any faster, and it may cause side effects. ¨ If you forget to take a dose, do NOT take a double dose of medicine. Take your usual dose the next day. · Tell your doctor about all prescription, herbal, or over-the-counter products you take. · Take care of yourself. Eat a healthy diet, get enough sleep, and get regular exercise. When should you call for help? Call 911 anytime you think you may need emergency care. For example, call if: 
· You passed out (lost consciousness). · You have severe trouble breathing. · You have a very slow heartbeat (less than 60 beats a minute). · You have a low body temperature (95°F or below). Call your doctor now or seek immediate medical care if: 
· You feel tired, sluggish, or weak. · You have trouble remembering things or concentrating. · You do not begin to feel better 2 weeks after starting your medicine. Watch closely for changes in your health, and be sure to contact your doctor if you have any problems. Where can you learn more? Go to http://maury-manny.info/. Enter J628 in the search box to learn more about \"Hypothyroidism: Care Instructions. \" Current as of: July 28, 2016 Content Version: 11.1 © 7692-1072 [employer], [employer].  Care instructions adapted under license by [employer] (which disclaims liability or warranty for this information). If you have questions about a medical condition or this instruction, always ask your healthcare professional. [address] any warranty or liability for your use of this information. Combination Birth Control Pills: Care Instructions Your Care Instructions Combination birth control pills are used to prevent pregnancy. They give you a regular dose of the hormones estrogen and progestin. You take a hormone pill every day to prevent pregnancy. Birth control pills come in packs. The most common type has 3 weeks of hormone pills. Some packs have sugar pills (they do not contain any hormones) for the fourth week. During that fourth no-hormone week, you have your period. After the fourth week (28 days), you start a new pack. Some birth control pills are packaged in different ways. For example, some have hormone pills for the fourth week instead of sugar pills. Taking hormones for the entire month causes you to not have periods or to have fewer periods. Others are packaged so that you have a period every 3 months. Your doctor will tell you what type of pills you have. Follow-up care is a key part of your treatment and safety. Be sure to make and go to all appointments, and call your doctor if you are having problems. It's also a good idea to know your test results and keep a list of the medicines you take. How can you care for yourself at home? How do you take the pill? · Follow your doctor's instructions about when to start taking your pills. Use backup birth control, such as a condom, or don't have intercourse for 7 days after you start your pills. · Take your pills every day, at about the same time of day. To help yourself do this, try to take them when you do something else every day, such as brushing your teeth. What if you forget to take a pill? Always read the label for specific instructions, or call your doctor. Here are some basic guidelines: · If you miss 1 hormone pill, take it as soon as you remember. Ask your doctor if you may need to use a backup birth control method, such as a condom, or not have intercourse. · If you miss 2 or more hormone pills, take one as soon as you remember you forgot them. Then read the pill label or call your doctor about instructions on how to take your missed pills. Use a backup method of birth control or don't have intercourse for 7 days. Pregnancy is more likely if you miss more than 1 pill. · If you had intercourse, you can use emergency contraception, such as the morning-after pill (Plan B). You can use emergency contraception for up to 5 days after having had intercourse, but it works best if you take it right away. What else do you need to know? · The pill has side effects. ¨ You may have very light or skipped periods. ¨ You may have bleeding between periods (spotting). This usually decreases after 3 to 4 months. ¨ You may have mood changes, less interest in sex, or weight gain. · The pill may reduce acne, heavy bleeding and cramping, and symptoms of premenstrual syndrome. · Check with your doctor before you use any other medicines, including over-the-counter medicines, vitamins, herbal products, and supplements. Birth control hormones may not work as well to prevent pregnancy when combined with other medicines. · The pill doesn't protect against sexually transmitted infection (STIs), such as herpes or HIV/AIDS. If you're not sure whether your sex partner might have an STI, use a condom to protect against disease. When should you call for help? Call your doctor now or seek immediate medical care if: 
· You have severe belly pain. · You have signs of a blood clot, such as: 
¨ Pain in your calf, back of the knee, thigh, or groin. ¨ Redness and swelling in your leg or groin. · You have blurred vision or other problems seeing. · You have a severe headache. · You have severe trouble breathing. Watch closely for changes in your health, and be sure to contact your doctor if: 
· You think you might be pregnant. · You think you may be depressed. · You think you may have been exposed to or have a sexually transmitted infection. Where can you learn more? Go to http://maury-manny.info/. Enter O310 in the search box to learn more about \"Combination Birth Control Pills: Care Instructions. \" Current as of: May 30, 2016 Content Version: 11.1 © 9492-5595 [employer]. Care instructions adapted under license by [employer] (which disclaims liability or warranty for this information). If you have questions about a medical condition or this instruction, always ask your healthcare professional. [address] any warranty or liability for your use of this information. Mini-Pills for Birth Control: Care Instructions Your Care Instructions Mini-pills are used to prevent pregnancy. They release a regular dose of the hormone progestin. They are different from regular combination birth control pills, which contain both progestin and estrogen. Mini-pills come in packs. Every mini-pill in the pack contains progestin. There are no sugar pills. So you have to take a pill every day at the same time to prevent pregnancy, even during your period. Follow-up care is a key part of your treatment and safety. Be sure to make and go to all appointments, and call your doctor if you are having problems. It's also a good idea to know your test results and keep a list of the medicines you take. How can you care for yourself at home? How do you take the mini-pill? · Follow your doctor's instructions about when to start taking your pills. It's best to take your first pill on the first day of your period. If you take the first pill another day, use backup birth control, such as a condom, or don't have intercourse for the next 48 hours (2 days). · Take every mini-pill in the pack, even during your period. Don't stop taking your pills if you have spotting between periods. · Take the pill at the same time every day. · Start your next pack the day after the last pack is finished. There is no break between packs. Always have your next pack of pills ready. What if you forget to take a pill? Always read the label for specific instructions, or call your doctor. Here are some basic guidelines: · If you take a pill more than 3 hours late, take it as soon as you remember, even if that means you will take 2 pills in one day. Then go back to your regular schedule. · Use backup birth control, such as a condom, or don't have intercourse for the next 48 hours to prevent pregnancy. · If you had intercourse within 5 days of forgetting to take the pill, you can use emergency contraception, such as the morning-after pill (Plan B). You can use emergency contraception for up to 5 days after having had sex, but it works best if you take it right away. What else do you need to know? · The mini-pill has side effects. ¨ You may have changes in your period and your period may stop. You may also have spotting or bleeding between periods. ¨ You may have mood changes, less interest in sex, or weight gain. · If you vomit or have diarrhea soon after taking a pill, use a backup method or don't have intercourse for 7 days. · Check with your doctor before you use any other medicines, including over-the-counter medicines, vitamins, herbal products, and supplements. Birth control hormones may not work as well to prevent pregnancy when combined with other medicines. · The mini-pill doesn't protect against sexually transmitted infections (STIs), such as herpes or HIV/AIDS. If you're not sure whether your sex partner might have an STI, use a condom to protect against disease. When should you call for help? Call your doctor now or seek immediate medical care if: · You have severe belly pain. Watch closely for changes in your health, and be sure to contact your doctor if: 
· You think you may be pregnant. · You have any problems with your birth control. · You feel you may be depressed. · You regularly have spotting. · You think you may have been exposed to or have a sexually transmitted infection. Where can you learn more? Go to http://maury-manny.info/. Enter T269 in the search box to learn more about \"Mini-Pills for Birth Control: Care Instructions. \" Current as of: May 30, 2016 Content Version: 11.1 © 6085-1510 [employer]. Care instructions adapted under license by [employer] (which disclaims liability or warranty for this information). If you have questions about a medical condition or this instruction, always ask your healthcare professional. [address] any warranty or liability for your use of this information. Shot for [employer] Control: Care Instructions Your Care Instructions The shot is used to prevent pregnancy. You get the shot in your upper arm or rear end (buttocks). The shot gives you a dose of the hormone progestin. The shot is often called by its brand name, Depo-Provera. The shot provides birth control for 3 months at a time. You then need another shot. Follow-up care is a key part of your treatment and safety. Be sure to make and go to all appointments, and call your doctor if you are having problems. It's also a good idea to know your test results and keep a list of the medicines you take. How can you care for yourself at home? How do you use the birth control shot? · If you get the shot during the first 5 days of your normal period, use backup birth control, such as a condom, or don't have intercourse for 24 hours. · If you get the shot more than 5 days after your periods starts, use backup birth control or don't have intercourse for 5 days. · Talk to your doctor about a schedule to get the shot. You need the shot every 3 months. If you are late getting it, you'll need backup birth control. What if you miss or are late for a shot? Always read the label for specific instructions, or call your doctor. Here are some basic guidelines: · Use backup birth control, such as a condom, or don't have intercourse. Continue using one of these methods until 5 days after you get the missed or late shot. · If you had intercourse, you can use emergency contraception, such as the morning-after pill (Plan B). You can use emergency contraception for up to 5 days after having had sex, but it works best if you take it right away. What else do you need to know? · The shot has side effects. Because the shot protects for 3 months, the side effects may last 3 months. ¨ You may have changes in your period and your period may stop. You may also have spotting or bleeding between periods. ¨ You may have mood changes, less interest in sex, or weight gain. · The shot may cause bone loss. Talk to your doctor about this and take steps to prevent bone loss, such as getting enough calcium in your diet and exercising regularly. · Check with your doctor before you use any other medicines, including over-the-counter medicines, vitamins, herbal products, and supplements. Birth control hormones may not work as well to prevent pregnancy when combined with other medicines. · The shot doesn't protect against sexually transmitted infections (STIs), such as herpes or HIV/AIDS. If you're not sure whether your sex partner might have an STI, use a condom to protect against infection. When should you call for help? Call your doctor now or seek immediate medical care if: 
· You have severe belly pain. Watch closely for changes in your health, and be sure to contact your doctor if: 
· You think you may be pregnant. · You have any problems with your birth control. · You feel you may be depressed. · You regularly have spotting. · You think you may have been exposed to or have a sexually transmitted infection. Where can you learn more? Go to http://maury-manny.info/. Enter T808 in the search box to learn more about \"Shot for Birth Control: Care Instructions. \" Current as of: May 30, 2016 Content Version: 11.1 © 6211-9065 [employer]. Care instructions adapted under license by [employer] (which disclaims liability or warranty for this information). If you have questions about a medical condition or this instruction, always ask your healthcare professional. [place] [address] any warranty or liability for your use of this information. Learning About Birth Control: Intrauterine Device (IUD) What is an intrauterine device (IUD)? The intrauterine device (IUD) is used to prevent pregnancy. It's a small, plastic, T-shaped device. Your doctor places the IUD in your uterus. You have a choice between a hormonal IUD and a copper IUD. The hormonal IUD prevents pregnancy by damaging or killing sperm. It also releases a type of the hormone progestin. Progestin prevents pregnancy in these ways: It thickens the mucus in the cervix. This makes it hard for sperm to travel into the uterus. It also thins the lining of the uterus, which makes it harder for a fertilized egg to attach to the uterus. Progestin can sometimes stop the ovaries from releasing an egg each month (ovulation). Hormonal IUDs prevent pregnancy for 3 to 5 years, depending on which IUD is used. Once you have it, you don't have to do anything else to prevent pregnancy. The copper IUD is wrapped in copper wire. Copper IUDs prevent pregnancy by making the uterus and fallopian tubes produce a fluid that kills sperm. The copper IUD prevents pregnancy for 10 years. Once you have it, you don't have to do anything else to prevent pregnancy. A string tied to the end of the IUD hangs down through the opening of the uterus (called the cervix) into the vagina. You can check that the IUD is in place by feeling for the string. The IUD usually stays in the uterus until your doctor removes it. How well does it work? In the first year of use: · When the hormonal IUD is used exactly as directed, fewer than 1 woman out of 100 has an unplanned pregnancy. · When the copper IUD is used exactly as directed, fewer than 1 woman out of 100 has an unplanned pregnancy. Be sure to tell your doctor about any health problems you have or medicines you take. He or she can help you choose the birth control method that is right for you. What are the advantages of an IUD? · An IUD is one of the most effective methods of birth control. · It prevents pregnancy for 3 to 10 years, depending on the type. You don't have to worry about birth control during this time. · It's safe to use while breastfeeding. · IUDs don't contain estrogen. So you can use an IUD if you don't want to take estrogen or can't take estrogen because you have certain health problems or concerns. · An IUD is convenient. It is always providing birth control. You don't need to remember to take a pill or get a shot. You don't have to interrupt sex to protect against pregnancy. · A hormonal IUD may reduce heavy bleeding and cramping. What are the disadvantages of an IUD? · An IUD doesn't protect against sexually transmitted infections (STIs), such as herpes or HIV/AIDS. If you aren't sure if your sex partner might have an STI, use a condom to protect against disease. · A copper IUD may cause periods with more bleeding and cramping. · You have to see a doctor to have an IUD inserted and removed. · You have to check to see if the string is in place. Where can you learn more? Go to http://maury-manny.info/.  
Enter X964 in the search box to learn more about \"Learning About Birth Control: Intrauterine Device (IUD). \" Current as of: May 30, 2016 Content Version: 11.1 © 8939-6017 [employer]. Care instructions adapted under license by [employer] (which disclaims liability or warranty for this information). If you have questions about a medical condition or this instruction, always ask your healthcare professional. [address] any warranty or liability for your use of this information. Endometrial Ablation: Before Your Procedure What is endometrial ablation? Endometrial ablation is a type of procedure that's often used to treat heavy menstrual bleeding. It can also be used for other types of bleeding in the uterus. It's not recommended if you plan to get pregnant. Ablation works by destroying the lining of your uterus. As it heals, the lining will scar. This scarring reduces or prevents bleeding. Your doctor may give you medicine to help you relax. You may also get medicine to help with pain. First, your doctor inserts a special tool into your vagina. This is called a speculum. It gently spreads apart the sides of your vagina. Next, the doctor may put a lighted tube through your cervix. This is called a hysteroscope or scope. It helps the doctor see inside your uterus. Then the doctor inserts a device to destroy the lining. This device may work in one of many ways. It may use a laser beam, heat, electricity, freezing, or microwaves. Ablation can be done in a doctor's office. Or it may be done in a hospital. It usually takes less than an hour. You can go home after the procedure. Follow-up care is a key part of your treatment and safety. Be sure to make and go to all appointments, and call your doctor if you are having problems. It's also a good idea to know your test results and keep a list of the medicines you take. What happens before the procedure? Procedures can be stressful.  This information will help you understand what you can expect. And it will help you safely prepare for your procedure. Preparing for the procedure · Understand exactly what procedure is planned, along with the risks, benefits, and other options. · Tell your doctors ALL the medicines, vitamins, supplements, and herbal remedies you take. Some of these can increase the risk of bleeding or interact with anesthesia. · If you take blood thinners, such as warfarin (Coumadin), clopidogrel (Plavix), or aspirin, be sure to talk to your doctor. He or she will tell you if you should stop taking these medicines before your procedure. Make sure that you understand exactly what your doctor wants you to do. · Your doctor will tell you which medicines to take or stop before your procedure. You may need to stop taking certain medicines a week or more before the procedure. So talk to your doctor as soon as you can. · If you have an advance directive, let your doctor know. It may include a living will and a durable power of  for health care. Bring a copy to the hospital. If you don't have one, you may want to prepare one. It lets your doctor and loved ones know your health care wishes. Doctors advise that everyone prepare these papers before any type of surgery or procedure. What happens on the day of the procedure? · Follow the instructions exactly about when to stop eating and drinking. If you don't, your procedure may be canceled. If your doctor told you to take your medicines on the day of the procedure, take them with only a sip of water. · Take a bath or shower before you come in for your procedure. Do not apply lotions, perfumes, deodorants, or nail polish. · Take off all jewelry and piercings. And take out contact lenses, if you wear them. At the [address] [place] [address] or hospital 
· Bring a picture ID. · You will be kept comfortable and safe by your anesthesia provider. You may get medicine that relaxes you or puts you in a light sleep. · The procedure will take less than an hour. Going home · Be sure you have someone to drive you home. Anesthesia and pain medicine make it unsafe for you to drive. · You will be given more specific instructions about recovering from your procedure. They will cover things like diet, wound care, follow-up care, driving, and getting back to your normal routine. When should you call your doctor? · You have questions or concerns. · You do not understand how to prepare for your procedure. · You become ill before the procedure (such as fever, flu, or a cold). · You need to reschedule or have changed your mind about having the procedure. Where can you learn more? Go to http://maury-manny.info/. Enter E109 in the search box to learn more about \"Endometrial Ablation: Before Your Procedure. \" Current as of: February 25, 2016 Content Version: 11.1 © 6431-7586 [employer]. Care instructions adapted under license by [employer] (which disclaims liability or warranty for this information). If you have questions about a medical condition or this instruction, always ask your healthcare professional. [address] any warranty or liability for your use of this information. Abnormal Uterine Bleeding: Care Instructions Your Care Instructions Abnormal uterine bleeding (AUB) is irregular bleeding from the uterus that is longer or heavier than usual or does not occur at your regular time. Sometimes it is caused by changes in hormone levels. It can also be caused by growths in the uterus, such as fibroids or polyps. Sometimes a cause cannot be found. You may have heavy bleeding when you are not expecting your period. Your doctor may suggest a pregnancy test, if you think you are pregnant. Follow-up care is a key part of your treatment and safety.  Be sure to make and go to all appointments, and call your doctor if you are having problems. It's also a good idea to know your test results and keep a list of the medicines you take. How can you care for yourself at home? · Be safe with medicines. Take pain medicines exactly as directed. ¨ If the doctor gave you a prescription medicine for pain, take it as prescribed. ¨ If you are not taking a prescription pain medicine, ask your doctor if you can take an over-the-counter medicine. · You may be low in iron because of blood loss. Eat a balanced diet that is high in iron and vitamin C. Foods rich in iron include red meat, shellfish, eggs, beans, and leafy green vegetables. Talk to your doctor about whether you need to take iron pills or a multivitamin. When should you call for help? Call 911 anytime you think you may need emergency care. For example, call if: 
· You passed out (lost consciousness). Call your doctor now or seek immediate medical care if: 
· You have sudden, severe pain in your belly or pelvis. · You have severe vaginal bleeding. You are soaking through your usual pads or tampons every hour for 2 or more hours. · You feel dizzy or lightheaded, or you feel like you may faint. Watch closely for changes in your health, and be sure to contact your doctor if: 
· You have new belly or pelvic pain. · You have a fever. · Your bleeding gets worse or lasts longer than 1 week. · You think you may be pregnant. Where can you learn more? Go to http://maury-manny.info/. Enter O522 in the search box to learn more about \"Abnormal Uterine Bleeding: Care Instructions. \" Current as of: February 25, 2016 Content Version: 11.1 © 6990-5754 [employer]. Care instructions adapted under license by [employer] your [employer] (which disclaims liability or warranty for this information).  If you have questions about a medical condition or this instruction, always ask your healthcare professional. [employer] disclaims any warranty or liability for your use of this information. Polycystic Ovary Syndrome: Care Instructions Your Care Instructions Polycystic ovary syndrome, or PCOS, means a woman's hormones are out of balance. It can cause problems with your periods and make it hard to get pregnant. Doctors don't know for sure what causes PCOS, but it seems to run in families. It also seems to be linked to obesity and a risk for diabetes. If you have PCOS, your sisters and daughters have a higher chance of getting it too. You may have other symptoms. These include weight gain, acne, too much hair growth on the face or body, high blood pressure, and high blood sugar. Your ovaries may have cysts on them. These cysts are growths filled with fluid. Keep in mind that although you may not have regular periods, you can still get pregnant. Talk to your doctor about birth control if you do not want to get pregnant. Sometimes the hormone changes with PCOS can also make it hard for some women to get pregnant. If this is a concern, talk to your doctor about treatment for this problem. Women who have PCOS can go for months or longer with no period. Your doctor may recommend medicines that can help get your cycles back to normal. 
Follow-up care is a key part of your treatment and safety. Be sure to make and go to all appointments, and call your doctor if you are having problems. It's also a good idea to know your test results and keep a list of the medicines you take. How can you care for yourself at home? · Take your medicines exactly as prescribed. Call your doctor if you think you are having a problem with your medicine. · Eat a healthy diet. Include fruits, vegetables, beans, and whole grains in your diet each day. · If you are overweight, losing weight can help with many of the symptoms of PCOS. Talk to your doctor about safe ways to lose weight. · Get at least 30 minutes of exercise on most days of the week. Walking is a good choice. Or you can run, swim, cycle, or play tennis or team sports. · For hair growth you don't want, try bleaching, plucking, electrolysis, or laser therapy. · Acne can be treated with over-the-counter medicines. Look for ones that have benzoyl peroxide or salicylic acid in them. When should you call for help? Call your doctor now or seek immediate medical care if: 
· You have severe vaginal bleeding. This means that you are soaking through your usual pads or tampons each hour for 2 or more hours. Watch closely for changes in your health, and be sure to contact your doctor if: 
· You have more vaginal bleeding, or bleeding is more irregular. Where can you learn more? Go to http://maury-manny.info/. Enter I361 in the search box to learn more about \"Polycystic Ovary Syndrome: Care Instructions. \" Current as of: February 25, 2016 Content Version: 11.1 © 3607-0782 [employer]. Care instructions adapted under license by [employer] (which disclaims liability or warranty for this information). If you have questions about a medical condition or this instruction, always ask your healthcare professional. [place] [address] any warranty or liability for your use of this information. Please provide this summary of care documentation to your next provider. Your primary care clinician is listed as [address] [place] [address], . If you have any questions after today's visit, please call [phone].

## 2017-02-13 LAB
ALBUMIN SERPL-MCNC: 4.2 G/DL (ref 3.5–5.5)
ALP SERPL-CCNC: 99 IU/L (ref 39–117)
ALT SERPL-CCNC: 50 IU/L (ref 0–32)
APTT PPP: 26 SEC
AST SERPL-CCNC: 29 IU/L (ref 0–40)
BASOPHILS # BLD AUTO: 0 X10E3/UL (ref 0–0.2)
BASOPHILS NFR BLD AUTO: 0 %
BILIRUB DIRECT SERPL-MCNC: 0.09 MG/DL (ref 0–0.4)
BILIRUB SERPL-MCNC: 0.2 MG/DL (ref 0–1.2)
EOSINOPHIL # BLD AUTO: 0.3 X10E3/UL (ref 0–0.4)
EOSINOPHIL NFR BLD AUTO: 4 %
ERYTHROCYTE [DISTWIDTH] IN BLOOD BY AUTOMATED COUNT: 13.3 % (ref 12.3–15.4)
FACT VIII ACT/NOR PPP: 155 %
FSH SERPL-ACNC: 5.3 MIU/ML
HCT VFR BLD AUTO: 35.8 % (ref 34–46.6)
HGB BLD-MCNC: 11.8 G/DL (ref 11.1–15.9)
IMM GRANULOCYTES # BLD: 0 X10E3/UL (ref 0–0.1)
IMM GRANULOCYTES NFR BLD: 0 %
INR PPP: 1 (ref 0.8–1.2)
LH SERPL-ACNC: 9.6 MIU/ML
LYMPHOCYTES # BLD AUTO: 1.5 X10E3/UL (ref 0.7–3.1)
LYMPHOCYTES NFR BLD AUTO: 22 %
MCH RBC QN AUTO: 29.2 PG (ref 26.6–33)
MCHC RBC AUTO-ENTMCNC: 33 G/DL (ref 31.5–35.7)
MCV RBC AUTO: 89 FL (ref 79–97)
MONOCYTES # BLD AUTO: 0.7 X10E3/UL (ref 0.1–0.9)
MONOCYTES NFR BLD AUTO: 10 %
NEUTROPHILS # BLD AUTO: 4.3 X10E3/UL (ref 1.4–7)
NEUTROPHILS NFR BLD AUTO: 64 %
PLATELET # BLD AUTO: 239 X10E3/UL (ref 150–379)
PROLACTIN SERPL-MCNC: 17.5 NG/ML (ref 4.8–23.3)
PROT SERPL-MCNC: 7.1 G/DL (ref 6–8.5)
PROTHROMBIN TIME: 10.2 SEC (ref 9.1–12)
RBC # BLD AUTO: 4.04 X10E6/UL (ref 3.77–5.28)
VWF AG ACT/NOR PPP IA: 155 %
VWF:RCO ACT/NOR PPP PL AGG: 134 %
WBC # BLD AUTO: 6.8 X10E3/UL (ref 3.4–10.8)

## 2017-02-14 NOTE — PROGRESS NOTES
Letter:  All of your labs are normal except one of your liver function tests. All of your hormone levels were normal and your blood clotting function is also normal.  One of your liver function tests were elevated. Likely incidental. Nonetheless, please abstain from all alcohol. Please use tylenol with discretion and we can check this level again in 3-6 months.

## 2017-02-22 ENCOUNTER — OFFICE VISIT (OUTPATIENT)
Dept: FAMILY MEDICINE CLINIC | Age: 42
End: 2017-02-22

## 2017-02-22 VITALS
WEIGHT: 244 LBS | TEMPERATURE: 98.7 F | HEART RATE: 114 BPM | OXYGEN SATURATION: 99 % | RESPIRATION RATE: 18 BRPM | BODY MASS INDEX: 38.3 KG/M2 | HEIGHT: 67 IN | SYSTOLIC BLOOD PRESSURE: 103 MMHG | DIASTOLIC BLOOD PRESSURE: 75 MMHG

## 2017-02-22 DIAGNOSIS — N93.9 VAGINAL BLEEDING: Primary | ICD-10-CM

## 2017-02-22 LAB
HCG URINE, QL. (POC): NEGATIVE
VALID INTERNAL CONTROL?: YES

## 2017-02-22 NOTE — PROGRESS NOTES
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    Had a period for 3 months    Started in November, stopped for few days in December  Caled in medicine that made it worse  Was on provera and bleeding got heavier    Options:  IUD  depoprovera  OCP    Had conization in April -- had period two weeks after conization, then had no bleeding until November    Had PAP in fall that was negative    Still breastfeeding but trying to wean her baby now    Periods had always been irregular; irregular 6 weeks    PE:  General -- awake, alert, cooperative  abd -- soft, NT, ND  Pelvic -- external genitalia WNL, pink moist vaginal mucosa, cervix without lesions, os closed, no uterine or adnexal masses or tenderness    Urine pregnancy test:  Negative    Assessment:  Abnormal uterine bleeding  Suspect hx PCOS -- previously had been on OCPs  Hx CD x 2  BMI = 38  Hypothyroidism    Plan:  Refer to Dr. Isaac Brewer for further evaluation  Suspect need for EMBx and/or D&C/hysteroscopy  Needs followup TSH/free T4

## 2017-03-14 ENCOUNTER — OFFICE VISIT (OUTPATIENT)
Dept: MIDWIFE SERVICES | Age: 42
End: 2017-03-14

## 2017-03-14 VITALS
DIASTOLIC BLOOD PRESSURE: 84 MMHG | HEIGHT: 67 IN | HEART RATE: 90 BPM | SYSTOLIC BLOOD PRESSURE: 117 MMHG | BODY MASS INDEX: 38.61 KG/M2 | WEIGHT: 246 LBS

## 2017-03-14 DIAGNOSIS — N93.9 ABNORMAL UTERINE BLEEDING: Primary | ICD-10-CM

## 2017-03-14 RX ORDER — LEVOTHYROXINE SODIUM 125 UG/1
TABLET ORAL
Refills: 2 | COMMUNITY
Start: 2016-12-24 | End: 2017-03-20 | Stop reason: DRUGHIGH

## 2017-03-14 NOTE — PROGRESS NOTES
GYN Visit Note          Chief Complaint: heavy prolonged period, referral from Dr. Izquierdo Draft    HPI:    Jackson Guerra  46 y.o. WF     LMP:  2016  Here for evaluation of heavy prolonged period2    PAP History:    10/2016  CERVICAL/ENDOCERVICAL IMAGE PROCESSED THIN PREP  SATISFACTORY FOR EVALUATION  NEGATIVE FOR INTRAEITHELIAL LESION OR MALIGNANCY  Neg HR HPV. Review of Systems: -    General ROS: negative for - chills, fever or malaise  Gastrointestinal ROS: no abdominal pain, change in bowel habits, or black or bloody stools  Genito-Urinary ROS: no dysuria, trouble voiding, or hematuria    OBJECTIVE:  Blood pressure 117/84, pulse 90, height 5' 7\" (1.702 m), weight 246 lb (111.6 kg), last menstrual period 2016, not currently breastfeeding. General appearance - alert, well appearing, and in no distress  Abdomen - soft, nontender, nondistended, no masses or organomegaly  Pelvic - VULVA: normal appearing vulva with no masses, tenderness or lesions, VAGINA: normal appearing vagina with normal color and discharge, no lesions, CERVIX: normal appearing cervix without discharge or lesions, UTERUS: uterus is normal size, shape, consistency and nontender, ADNEXA: normal adnexa in size, nontender and no masses, exam chaperoned by Chip Glass RN   Extremities - peripheral pulses normal, no pedal edema, no clubbing or cyanosis    ASSESSMENT / PLAN:    Cheyanne Saucedo was seen today for ultrasound and procedure. Diagnoses and all orders for this visit:    Abnormal uterine bleeding  -     AMB POC US, TRANSVAGINAL  -     BIOPSY OF UTERUS LINING    Follow-up Disposition:  Return in about 6 months (around 2017), or pending pathology. Fredy Luna MD, [address] [place] [address], Retired    Kira Judge Professor, [address]

## 2017-03-14 NOTE — PROCEDURES
Gyn Report [facility] Page 1/ [facility]  Patient / Exam Information Date of Exam: 2017  Name: ZINA ANG [facility]. Phys: Shelia FOFANA  Patient ID: [id number] : 1975 Ref. Phys: Vikram Bates  [address]. ID: Age: 46 Sonographer: Shelia FOFANA  Indication: Sex: Female Exam Type: gyn  LMP  LMP Day of Cycle  AB  Day of stim. Expected Ovul. Para Ectopic  2D Measurements Value m1 m2 m3 m4 m5 m6 Meth. Uterus  Length 3.26 cm 5.19 1.32 avg. Width 6.09 cm 6.09 avg. Height 4.86 cm 4.86 avg. Volume 50.443 cm³ 80.430  Endo. Thickness 9.84 mm 9.84 avg.  Cervix Length 1.51 cm 1.36 1.66 avg. Left Ovary  Length 3.21 cm 3.21 avg. Width 2.82 cm 2.82 avg. Height 2.87 cm 2.87 avg. Volume 13.603 cm³ 13.603  Right Ovary  Length 2.28 cm 2.28 avg. Width 2.33 cm 2.33 avg. Height 2.52 cm 2.52 avg. Volume 7.010 cm³ 7.010  Comment  Normal appearing AVAF uterus with symmetrical uterine lining and no indication of discrete endometrial mass. Recommendations  Suitable candidate for blind EMB  [facility], [facility]  [address], [address] [facility]  [phone]  Date: 2017 Perf.  Physician: Shelia FOFANA Sonographer: Shelia FOFANA

## 2017-03-14 NOTE — PROCEDURES
EMB Procedure Note       Anshul Jordan is a  46 y.o.     here for EMB to evaluate menorrhagia    Indication:    ICD-10-CM ICD-9-CM    1. Abnormal uterine bleeding N93.9 626.9 AMB POC US, TRANSVAGINAL      BIOPSY OF UTERUS LINING     Allergies   Allergen Reactions    Hydrocodone Shortness of Breath and Vertigo    Omeprazole Hives    Quibron [Theophylline-Guaifenesin] Other (comments)     Hallucinations and violent.  Tape [Adhesive] Rash       Pre-Procedure Medication    None:  Patient advised to avoid NSAID due to call stones    PROCEDURE:  Blood pressure 117/84, pulse 90, height 5' 7\" (1.702 m), weight 246 lb (111.6 kg), last menstrual period 2016, not currently breastfeeding. After informed consent and a surgical time out to verify the patient and procedure, the patient was placed in lithotomy and the cervix was sprayed with topical anesthesia. The cervix was then cleaned with Betadine and the pipelle was passed into the endometrial cavity and a sample was obtained by applying suction and several passes to insure adequate coverage. Tissue in pipelle . Specimen was sent to pathology. Patient tolerated the procedure with minor discomfort from cramping. Patient advised to take additional NSAID for cramping and to report any heavy, prolonged vaginal bleeding. Charles A. Vena Ganser, MD, [address] [place] [address], Retired    Ronnell Carter, [address]

## 2017-03-14 NOTE — PROGRESS NOTES
Chief Complaint   Patient presents with    Ultrasound     to evaluate abnormal bleeding    Procedure     endometrial biopsy       Visit Vitals    /84    Pulse 90    Ht 5' 7\" (1.702 m)    Wt 246 lb (111.6 kg)    Breastfeeding No    BMI 38.53 kg/m2       1. Have you been to the ER, urgent care clinic since your last visit? Hospitalized since your last visit? No    2. Have you seen or consulted any other health care providers outside of the [address] [place] [address] since your last visit? Include any pap smears or colon screening. No     Here today for an ultrasound to evaluate her abnormal bleeding. She started in November and stopped in February. She reports that they were very heavy , then light and go back in forth. After having taken provera the bleeding increased to soaking one super pap each hour and a half, then stopped abruptly. [address] [employer]  OFFICE PROCEDURE PROGRESS NOTE        Chart reviewed for the following:   Ayesha MILLS RN, have reviewed the History, Physical and updated the Allergic reactions for Fanta Mendoza.      TIME OUT performed immediately prior to start of procedure:   Ayesha MILLS RN, have performed the following reviews on [address] [place] [address] prior to the start of the procedure:            * Patient was identified by name and date of birth   * Agreement on procedure being performed was verified  * Risks and Benefits explained to the patient  * Procedure site verified and marked as necessary  * Patient was positioned for comfort  * Consent was signed and verified     Time: 8494      Date of procedure: 03/14/17    Procedure performed by: Barb Shoemaker MD    Provider assisted by: Ayesha Will RN      Patient assisted by: self    How tolerated by patient: tolerated the procedure well with no complications    Comments:

## 2017-03-21 LAB
DX ICD CODE: NORMAL
DX ICD CODE: NORMAL
PATH REPORT.FINAL DX SPEC: NORMAL
PATH REPORT.GROSS SPEC: NORMAL
PATH REPORT.RELEVANT HX SPEC: NORMAL
PATH REPORT.SITE OF ORIGIN SPEC: NORMAL
PATHOLOGIST NAME: NORMAL
PAYMENT PROCEDURE: NORMAL

## 2017-03-23 NOTE — PROGRESS NOTES
ENDOMETRIUM, BIOPSY:   BENIGN ENDOMETRIAL HYPERPLASIA WITH AN AREA OF GLANDULAR CROWDING. COMMENT:   Patients with glandular crowding may show the presence of atypical   hyperplasia on subsequent biopsy.  Repeat sampling is recommended in 3-6   months or earlier if there are clinical concerns.

## 2017-03-30 ENCOUNTER — HOSPITAL ENCOUNTER (EMERGENCY)
Age: 42
Discharge: HOME | End: 2017-03-30
Attending: EMERGENCY MEDICINE
Payer: COMMERCIAL

## 2017-03-30 ENCOUNTER — APPOINTMENT (OUTPATIENT)
Dept: ULTRASOUND IMAGING | Age: 42
End: 2017-03-30
Attending: EMERGENCY MEDICINE
Payer: COMMERCIAL

## 2017-03-30 VITALS
BODY MASS INDEX: 38.37 KG/M2 | HEIGHT: 67 IN | SYSTOLIC BLOOD PRESSURE: 113 MMHG | HEART RATE: 76 BPM | TEMPERATURE: 98.2 F | WEIGHT: 244.49 LBS | OXYGEN SATURATION: 98 % | RESPIRATION RATE: 16 BRPM | DIASTOLIC BLOOD PRESSURE: 66 MMHG

## 2017-03-30 DIAGNOSIS — K80.50 BILIARY COLIC: Primary | ICD-10-CM

## 2017-03-30 DIAGNOSIS — R74.8 ELEVATED LIVER ENZYMES: ICD-10-CM

## 2017-03-30 LAB
ALBUMIN SERPL BCP-MCNC: 3.8 G/DL (ref 3.5–5)
ALBUMIN/GLOB SERPL: 1 {RATIO} (ref 1.1–2.2)
ALP SERPL-CCNC: 116 U/L (ref 45–117)
ALT SERPL-CCNC: 99 U/L (ref 12–78)
ANION GAP BLD CALC-SCNC: 7 MMOL/L (ref 5–15)
APPEARANCE UR: ABNORMAL
AST SERPL W P-5'-P-CCNC: 44 U/L (ref 15–37)
ATRIAL RATE: 70 BPM
BACTERIA URNS QL MICRO: ABNORMAL /HPF
BASOPHILS # BLD AUTO: 0 K/UL (ref 0–0.1)
BASOPHILS # BLD: 0 % (ref 0–1)
BILIRUB SERPL-MCNC: 0.4 MG/DL (ref 0.2–1)
BILIRUB UR QL: NEGATIVE
BUN SERPL-MCNC: 9 MG/DL (ref 6–20)
BUN/CREAT SERPL: 9 (ref 12–20)
CALCIUM SERPL-MCNC: 8.9 MG/DL (ref 8.5–10.1)
CALCULATED P AXIS, ECG09: 31 DEGREES
CALCULATED R AXIS, ECG10: 31 DEGREES
CALCULATED T AXIS, ECG11: 33 DEGREES
CHLORIDE SERPL-SCNC: 104 MMOL/L (ref 97–108)
CO2 SERPL-SCNC: 27 MMOL/L (ref 21–32)
COLOR UR: ABNORMAL
CREAT SERPL-MCNC: 0.99 MG/DL (ref 0.55–1.02)
DIAGNOSIS, 93000: NORMAL
EOSINOPHIL # BLD: 0.3 K/UL (ref 0–0.4)
EOSINOPHIL NFR BLD: 4 % (ref 0–7)
EPITH CASTS URNS QL MICRO: ABNORMAL /LPF
ERYTHROCYTE [DISTWIDTH] IN BLOOD BY AUTOMATED COUNT: 13.6 % (ref 11.5–14.5)
GLOBULIN SER CALC-MCNC: 4 G/DL (ref 2–4)
GLUCOSE SERPL-MCNC: 98 MG/DL (ref 65–100)
GLUCOSE UR STRIP.AUTO-MCNC: NEGATIVE MG/DL
HCG UR QL: NEGATIVE
HCT VFR BLD AUTO: 38.7 % (ref 35–47)
HGB BLD-MCNC: 12.4 G/DL (ref 11.5–16)
HGB UR QL STRIP: NEGATIVE
KETONES UR QL STRIP.AUTO: NEGATIVE MG/DL
LEUKOCYTE ESTERASE UR QL STRIP.AUTO: NEGATIVE
LIPASE SERPL-CCNC: 361 U/L (ref 73–393)
LYMPHOCYTES # BLD AUTO: 20 % (ref 12–49)
LYMPHOCYTES # BLD: 1.4 K/UL (ref 0.8–3.5)
MAGNESIUM SERPL-MCNC: 2.1 MG/DL (ref 1.6–2.4)
MCH RBC QN AUTO: 26.8 PG (ref 26–34)
MCHC RBC AUTO-ENTMCNC: 32 G/DL (ref 30–36.5)
MCV RBC AUTO: 83.8 FL (ref 80–99)
MONOCYTES # BLD: 0.6 K/UL (ref 0–1)
MONOCYTES NFR BLD AUTO: 9 % (ref 5–13)
NEUTS SEG # BLD: 4.7 K/UL (ref 1.8–8)
NEUTS SEG NFR BLD AUTO: 67 % (ref 32–75)
NITRITE UR QL STRIP.AUTO: NEGATIVE
P-R INTERVAL, ECG05: 142 MS
PH UR STRIP: 6.5 [PH] (ref 5–8)
PLATELET # BLD AUTO: 234 K/UL (ref 150–400)
POTASSIUM SERPL-SCNC: 4 MMOL/L (ref 3.5–5.1)
PROT SERPL-MCNC: 7.8 G/DL (ref 6.4–8.2)
PROT UR STRIP-MCNC: NEGATIVE MG/DL
Q-T INTERVAL, ECG07: 376 MS
QRS DURATION, ECG06: 80 MS
QTC CALCULATION (BEZET), ECG08: 406 MS
RBC # BLD AUTO: 4.62 M/UL (ref 3.8–5.2)
RBC #/AREA URNS HPF: ABNORMAL /HPF (ref 0–5)
SODIUM SERPL-SCNC: 138 MMOL/L (ref 136–145)
SP GR UR REFRACTOMETRY: 1.02 (ref 1–1.03)
UA: UC IF INDICATED,UAUC: ABNORMAL
UROBILINOGEN UR QL STRIP.AUTO: 0.2 EU/DL (ref 0.2–1)
VENTRICULAR RATE, ECG03: 70 BPM
WBC # BLD AUTO: 7 K/UL (ref 3.6–11)
WBC URNS QL MICRO: ABNORMAL /HPF (ref 0–4)

## 2017-03-30 PROCEDURE — 83735 ASSAY OF MAGNESIUM: CPT | Performed by: EMERGENCY MEDICINE

## 2017-03-30 PROCEDURE — 80053 COMPREHEN METABOLIC PANEL: CPT | Performed by: EMERGENCY MEDICINE

## 2017-03-30 PROCEDURE — 83690 ASSAY OF LIPASE: CPT | Performed by: EMERGENCY MEDICINE

## 2017-03-30 PROCEDURE — 81001 URINALYSIS AUTO W/SCOPE: CPT | Performed by: EMERGENCY MEDICINE

## 2017-03-30 PROCEDURE — 76705 ECHO EXAM OF ABDOMEN: CPT

## 2017-03-30 PROCEDURE — 93005 ELECTROCARDIOGRAM TRACING: CPT

## 2017-03-30 PROCEDURE — 96375 TX/PRO/DX INJ NEW DRUG ADDON: CPT

## 2017-03-30 PROCEDURE — 99284 EMERGENCY DEPT VISIT MOD MDM: CPT

## 2017-03-30 PROCEDURE — 96374 THER/PROPH/DIAG INJ IV PUSH: CPT

## 2017-03-30 PROCEDURE — 74011250636 HC RX REV CODE- 250/636: Performed by: EMERGENCY MEDICINE

## 2017-03-30 PROCEDURE — 85025 COMPLETE CBC W/AUTO DIFF WBC: CPT | Performed by: EMERGENCY MEDICINE

## 2017-03-30 PROCEDURE — 96361 HYDRATE IV INFUSION ADD-ON: CPT

## 2017-03-30 PROCEDURE — 36415 COLL VENOUS BLD VENIPUNCTURE: CPT | Performed by: EMERGENCY MEDICINE

## 2017-03-30 PROCEDURE — 81025 URINE PREGNANCY TEST: CPT

## 2017-03-30 PROCEDURE — 87086 URINE CULTURE/COLONY COUNT: CPT | Performed by: EMERGENCY MEDICINE

## 2017-03-30 RX ORDER — TRAMADOL HYDROCHLORIDE 50 MG/1
50 TABLET ORAL
Qty: 10 TAB | Refills: 0 | Status: SHIPPED | OUTPATIENT
Start: 2017-03-30 | End: 2017-03-30

## 2017-03-30 RX ORDER — ACETAMINOPHEN 500 MG
1000 TABLET ORAL
COMMUNITY
End: 2017-04-17

## 2017-03-30 RX ORDER — ONDANSETRON 4 MG/1
4 TABLET, ORALLY DISINTEGRATING ORAL
Qty: 10 TAB | Refills: 0 | Status: SHIPPED | OUTPATIENT
Start: 2017-03-30 | End: 2017-04-17

## 2017-03-30 RX ORDER — HYDROMORPHONE HYDROCHLORIDE 1 MG/ML
1 INJECTION, SOLUTION INTRAMUSCULAR; INTRAVENOUS; SUBCUTANEOUS
Status: COMPLETED | OUTPATIENT
Start: 2017-03-30 | End: 2017-03-30

## 2017-03-30 RX ORDER — OXYCODONE HYDROCHLORIDE 5 MG/1
5 TABLET ORAL
Qty: 8 TAB | Refills: 0 | Status: SHIPPED | OUTPATIENT
Start: 2017-03-30 | End: 2017-04-17

## 2017-03-30 RX ORDER — ONDANSETRON 2 MG/ML
4 INJECTION INTRAMUSCULAR; INTRAVENOUS
Status: COMPLETED | OUTPATIENT
Start: 2017-03-30 | End: 2017-03-30

## 2017-03-30 RX ADMIN — SODIUM CHLORIDE 1000 ML: 900 INJECTION, SOLUTION INTRAVENOUS at 04:17

## 2017-03-30 RX ADMIN — HYDROMORPHONE HYDROCHLORIDE 1 MG: 1 INJECTION, SOLUTION INTRAMUSCULAR; INTRAVENOUS; SUBCUTANEOUS at 04:23

## 2017-03-30 RX ADMIN — ONDANSETRON 4 MG: 2 INJECTION INTRAMUSCULAR; INTRAVENOUS at 04:16

## 2017-03-30 NOTE — DISCHARGE INSTRUCTIONS
Biliary Colic: Care Instructions  Your Care Instructions    Biliary (say \"BILL-ee-air-ee\") colic is belly pain caused by gallbladder problems. It is usually caused by a gallstone moving through or blocking the common bile duct or cystic duct. Gallstones are stones that form in the gallbladder. They are made of cholesterol and other substances. The gallbladder is a small sac located just under the liver. It stores bile released by the liver. Bile helps you digest fats. Gallstones also can form in the common bile duct or cystic duct. These ducts carry bile from the gallbladder and the liver to the small intestine. Gallstones may be as small as a grain of sand or as large as a golf ball. Gallstones that cause severe symptoms usually are treated with surgery to remove the gallbladder. If the first attack of biliary colic is mild, it is often safe to wait until you have had another attack before you think about having surgery. The doctor has checked you carefully, but problems can develop later. If you notice any problems or new symptoms, get medical treatment right away. Follow-up care is a key part of your treatment and safety. Be sure to make and go to all appointments, and call your doctor if you are having problems. It's also a good idea to know your test results and keep a list of the medicines you take. How can you care for yourself at home? · Take pain medicines exactly as directed. ¨ If the doctor gave you a prescription medicine for pain, take it as prescribed. ¨ If you are not taking a prescription pain medicine, ask your doctor if you can take an over-the-counter medicine. Read and follow all instructions on the label. · Avoid foods that cause symptoms, especially fatty foods. These can cause biliary colic. · You may need more tests to look at your gallbladder. When should you call for help? Call your doctor now or seek immediate medical care if:  · You have a fever.   · You have new belly pain, or your pain gets worse. · There is a new or increasing yellow tint to your skin or the whites of your eyes. · Your urine is dark yellow-brown, or your stools are light-colored or white. · You cannot keep down fluids. Watch closely for changes in your health, and be sure to contact your doctor if:  · You do not get better as expected. · You are not getting better after 1 day (24 hours). Where can you learn more? Go to http://maury-manny.info/. Enter N727 in the search box to learn more about \"Biliary Colic: Care Instructions. \"  Current as of: August 9, 2016  Content Version: 11.2  © 2190-1716 [employer]. Care instructions adapted under license by [employer] (which disclaims liability or warranty for this information). If you have questions about a medical condition or this instruction, always ask your healthcare professional. [address] any warranty or liability for your use of this information. We hope that we have addressed all of your medical concerns. The examination and treatment you received in the Emergency Department were for an emergent problem and were not intended as complete care. It is important that you follow up with your healthcare provider(s) for ongoing care. If your symptoms worsen or do not improve as expected, and you are unable to reach your usual health care provider(s), you should return to the Emergency Department. Today's healthcare is undergoing tremendous change, and patient satisfaction surveys are one of the many tools to assess the quality of medical care. You may receive a survey from the [employer] regarding your experience in the Emergency Department. I hope that your experience has been completely positive, particularly the medical care that I provided. As such, please participate in the survey; anything less than excellent does not meet my expectations or intentions. [facility] and [facility] participate in nationally recognized quality of care measures. If your blood pressure is greater than 120/80, as reported below, we urge that you seek medical care to address the potential of high blood pressure, commonly known as hypertension. Hypertension can be hereditary or can be caused by certain medical conditions, pain, stress, or \"white coat syndrome. \"       Please make an appointment with your health care provider(s) for follow up of your Emergency Department visit. VITALS:   Patient Vitals for the past 8 hrs:   Temp Pulse Resp BP SpO2   03/30/17 0322 98.2 °F (36.8 °C) 76 16 133/80 100 %          Thank you for allowing us to provide you with medical care today. We realize that you have many choices for your emergency care needs. Please choose us in the future for any continued health care needs. Andrew Sosa Alyce Wilkinson, [address] [place] [address] 20.   Office: [phone]            Recent Results (from the past 24 hour(s))   CBC WITH AUTOMATED DIFF    Collection Time: 03/30/17  3:41 AM   Result Value Ref Range    WBC 7.0 3.6 - 11.0 K/uL    RBC 4.62 3.80 - 5.20 M/uL    HGB 12.4 11.5 - 16.0 g/dL    HCT 38.7 35.0 - 47.0 %    MCV 83.8 80.0 - 99.0 FL    MCH 26.8 26.0 - 34.0 PG    MCHC 32.0 30.0 - 36.5 g/dL    RDW 13.6 11.5 - 14.5 %    PLATELET 549 474 - 579 K/uL    NEUTROPHILS 67 32 - 75 %    LYMPHOCYTES 20 12 - 49 %    MONOCYTES 9 5 - 13 %    EOSINOPHILS 4 0 - 7 %    BASOPHILS 0 0 - 1 %    ABS. NEUTROPHILS 4.7 1.8 - 8.0 K/UL    ABS. LYMPHOCYTES 1.4 0.8 - 3.5 K/UL    ABS. MONOCYTES 0.6 0.0 - 1.0 K/UL    ABS. EOSINOPHILS 0.3 0.0 - 0.4 K/UL    ABS.  BASOPHILS 0.0 0.0 - 0.1 K/UL   METABOLIC PANEL, COMPREHENSIVE    Collection Time: 03/30/17  3:41 AM   Result Value Ref Range    Sodium 138 136 - 145 mmol/L    Potassium 4.0 3.5 - 5.1 mmol/L    Chloride 104 97 - 108 mmol/L    CO2 27 21 - 32 mmol/L    Anion gap 7 5 - 15 mmol/L    Glucose 98 65 - 100 mg/dL    BUN 9 6 - 20 MG/DL    Creatinine 0.99 0.55 - 1.02 MG/DL    BUN/Creatinine ratio 9 (L) 12 - 20      GFR est AA >60 >60 ml/min/1.73m2    GFR est non-AA >60 >60 ml/min/1.73m2    Calcium 8.9 8.5 - 10.1 MG/DL    Bilirubin, total 0.4 0.2 - 1.0 MG/DL    ALT (SGPT) 99 (H) 12 - 78 U/L    AST (SGOT) 44 (H) 15 - 37 U/L    Alk. phosphatase 116 45 - 117 U/L    Protein, total 7.8 6.4 - 8.2 g/dL    Albumin 3.8 3.5 - 5.0 g/dL    Globulin 4.0 2.0 - 4.0 g/dL    A-G Ratio 1.0 (L) 1.1 - 2.2     MAGNESIUM    Collection Time: 03/30/17  3:41 AM   Result Value Ref Range    Magnesium 2.1 1.6 - 2.4 mg/dL   LIPASE    Collection Time: 03/30/17  3:41 AM   Result Value Ref Range    Lipase 361 73 - 393 U/L   HCG URINE, QL. - POC    Collection Time: 03/30/17  4:17 AM   Result Value Ref Range    Pregnancy test,urine (POC) NEGATIVE  NEG     URINALYSIS W/ REFLEX CULTURE    Collection Time: 03/30/17  4:26 AM   Result Value Ref Range    Color YELLOW/STRAW      Appearance CLOUDY (A) CLEAR      Specific gravity 1.022 1.003 - 1.030      pH (UA) 6.5 5.0 - 8.0      Protein NEGATIVE  NEG mg/dL    Glucose NEGATIVE  NEG mg/dL    Ketone NEGATIVE  NEG mg/dL    Bilirubin NEGATIVE  NEG      Blood NEGATIVE  NEG      Urobilinogen 0.2 0.2 - 1.0 EU/dL    Nitrites NEGATIVE  NEG      Leukocyte Esterase NEGATIVE  NEG      WBC 0-4 0 - 4 /hpf    RBC 0-5 0 - 5 /hpf    Epithelial cells MODERATE (A) FEW /lpf    Bacteria 2+ (A) NEG /hpf    UA:UC IF INDICATED URINE CULTURE ORDERED (A) CNI         [employer]    Result Date: 3/30/2017  INDICATION: ruq FINDINGS: Limited ultrasound images of the right upper quadrant of the abdomen were obtained. The liver is normal in size. It demonstrates slight increased echogenicity. No focal hepatic mass or intrahepatic biliary dilatation is seen. The common bile duct is normal, measuring 5 mm in diameter. The gallbladder demonstrates a 1 cm mobile gallstone. . Imaging of the pancreas demonstrates no abnormality. The right kidney measures 11.3 cm in length. It demonstrates cortical echogenicity and no evidence of stone, mass, or hydronephrosis. The abdominal aorta is normal in caliber. The inferior vena cava is patent. IMPRESSION: 1. There is a 1 cm mobile gallstone. There is no ductal dilatation. . Liver is slightly increased in echogenicity may represent hepatic steatosis or hepatic parenchymal disease.

## 2017-03-30 NOTE — ED PROVIDER NOTES
HPI Comments: 46 y.o. female with past medical history significant for  x 2 who presents from home with chief complaint of abdominal pain. Pt reports acute onset of RUQ pain x 1200 this morning. She describes pain as moderate and radiating to epigastric region and through to right back. Pt also c/o nausea without vomiting. She further reports last bowel movement 2 days ago. Pt denies diarrhea, constipation, bloody or black stools, fever, chest pain or SOB. LMP \"2016-2017\" and is followed by Dr. Vikram Bates for abnormality. There are no other acute medical concerns at this time. Chart review: Biopsy of uterus lining performed 3/14/17 for abnormal uterine bleeding performed by Dr. Yamile Leyva. PCP: Todd Jara MD    Note written by Marcin Barrera, as dictated by Annika Rossi MD 3:58 AM    The history is provided by the patient. No  was used. Past Medical History:   Diagnosis Date    Abnormal Pap smear     colposcopy, wnl since    Asthma     stress induced(last in january)    Group B Streptococcus carrier, +RV culture, currently pregnant 16    Infertility     IUI to get pregnant    Trauma     broken R ankle, R foot, L arm       Past Surgical History:   Procedure Laterality Date     DELIVERY ONLY  12    CKC, AKA COLD KNIFE CONE      HX COLPOSCOPY  3/17/16    HX GYN      Csection X2    HX HEENT      DENTAL         Family History:   Problem Relation Age of Onset    Thyroid Disease Mother      also in maternal aunt    Cancer Maternal Grandmother     Cancer Paternal Grandmother      brain tumor    Anesth Problems Neg Hx        Social History     Social History    Marital status:      Spouse name: N/A    Number of children: N/A    Years of education: N/A     Occupational History    Not on file.      Social History Main Topics    Smoking status: Never Smoker    Smokeless tobacco: Never Used  Alcohol use No    Drug use: No    Sexual activity: Not Currently     Partners: Male     Birth control/ protection: None     Other Topics Concern    Not on file     Social History Narrative         ALLERGIES: Hydrocodone; Omeprazole; Quibron [theophylline-guaifenesin]; and Tape [adhesive]    Review of Systems   Constitutional: Negative for fever. HENT: Negative for congestion. Respiratory: Negative for cough and shortness of breath. Cardiovascular: Negative for chest pain. Gastrointestinal: Positive for abdominal pain (RUQ, radiating) and nausea. Negative for blood in stool, constipation, diarrhea and vomiting. Genitourinary: Negative for dysuria and hematuria. Musculoskeletal: Positive for back pain. Negative for neck pain. Neurological: Negative for headaches. All other systems reviewed and are negative.       Vitals:    03/30/17 0322   BP: 133/80   Pulse: 76   Resp: 16   Temp: 98.2 °F (36.8 °C)   SpO2: 100%   Weight: 110.9 kg (244 lb 7.8 oz)   Height: 5' 7\" (1.702 m)            Physical Exam   Physical Examination: General appearance - alert, well appearing, and in no distress, oriented to person, place, and time and normal appearing weight  Eyes - pupils equal and reactive, extraocular eye movements intact  Neck - supple, no significant adenopathy  Chest - clear to auscultation, no wheezes, rales or rhonchi, symmetric air entry  Heart - normal rate, regular rhythm, normal S1, S2, no murmurs, rubs, clicks or gallops  Abdomen - soft, tenderness to RUQ, no rebound/guarding/peritoneal signs, nondistended, no masses or organomegaly  Back exam - full range of motion, no tenderness, palpable spasm or pain on motion  Neurological - alert, oriented, normal speech, no focal findings or movement disorder noted  Musculoskeletal - no joint tenderness, deformity or swelling  Extremities - peripheral pulses normal, no pedal edema, no clubbing or cyanosis  Skin - normal coloration and turgor, no rashes, no suspicious skin lesions noted  MDM  Number of Diagnoses or Management Options     Amount and/or Complexity of Data Reviewed  Clinical lab tests: ordered and reviewed  Tests in the radiology section of CPT®: ordered and reviewed  Decide to obtain previous medical records or to obtain history from someone other than the patient: yes  Obtain history from someone other than the patient: yes (spouse)  Review and summarize past medical records: yes  Independent visualization of images, tracings, or specimens: yes    Patient Progress  Patient progress: improved    ED Course       Procedures    EKG interpretation: (Preliminary)  Rhythm: normal sinus rhythm; and regular . Rate (approx.): 70; Axis: normal; [place] interval: normal; QRS interval: normal ; ST/T wave: normal; sinus arrhythmia    PROGRESS NOTE:  5:29 AM  Pain has resolved. Pt will f/u with surgery for Biliary Colic.

## 2017-03-30 NOTE — ED TRIAGE NOTES
Pt arrives ambulatory to ED for c/o upper abdominal pain x 2.5 hrs that woke her from sleeping. Pt reports nausea but denies V/D. Pt reports last menstrual cycle was last November unknown reasons.

## 2017-03-31 LAB
BACTERIA SPEC CULT: NORMAL
CC UR VC: NORMAL
SERVICE CMNT-IMP: NORMAL

## 2017-04-17 ENCOUNTER — HOSPITAL ENCOUNTER (OUTPATIENT)
Dept: PREADMISSION TESTING | Age: 42
Discharge: HOME | End: 2017-04-17
Payer: COMMERCIAL

## 2017-04-17 VITALS
OXYGEN SATURATION: 98 % | BODY MASS INDEX: 37.92 KG/M2 | HEART RATE: 95 BPM | RESPIRATION RATE: 16 BRPM | TEMPERATURE: 97.8 F | WEIGHT: 241.62 LBS | HEIGHT: 67 IN | SYSTOLIC BLOOD PRESSURE: 121 MMHG | DIASTOLIC BLOOD PRESSURE: 78 MMHG

## 2017-04-17 LAB
ANION GAP BLD CALC-SCNC: 9 MMOL/L (ref 5–15)
BASOPHILS # BLD AUTO: 0 K/UL (ref 0–0.1)
BASOPHILS # BLD: 0 % (ref 0–1)
BUN SERPL-MCNC: 7 MG/DL (ref 6–20)
BUN/CREAT SERPL: 8 (ref 12–20)
CALCIUM SERPL-MCNC: 9.2 MG/DL (ref 8.5–10.1)
CHLORIDE SERPL-SCNC: 105 MMOL/L (ref 97–108)
CO2 SERPL-SCNC: 28 MMOL/L (ref 21–32)
CREAT SERPL-MCNC: 0.86 MG/DL (ref 0.55–1.02)
EOSINOPHIL # BLD: 0.2 K/UL (ref 0–0.4)
EOSINOPHIL NFR BLD: 4 % (ref 0–7)
ERYTHROCYTE [DISTWIDTH] IN BLOOD BY AUTOMATED COUNT: 14.9 % (ref 11.5–14.5)
GLUCOSE SERPL-MCNC: 84 MG/DL (ref 65–100)
HCT VFR BLD AUTO: 38.7 % (ref 35–47)
HGB BLD-MCNC: 12.5 G/DL (ref 11.5–16)
LYMPHOCYTES # BLD AUTO: 21 % (ref 12–49)
LYMPHOCYTES # BLD: 0.9 K/UL (ref 0.8–3.5)
MCH RBC QN AUTO: 26.8 PG (ref 26–34)
MCHC RBC AUTO-ENTMCNC: 32.3 G/DL (ref 30–36.5)
MCV RBC AUTO: 83 FL (ref 80–99)
MONOCYTES # BLD: 0.7 K/UL (ref 0–1)
MONOCYTES NFR BLD AUTO: 16 % (ref 5–13)
NEUTS SEG # BLD: 2.7 K/UL (ref 1.8–8)
NEUTS SEG NFR BLD AUTO: 59 % (ref 32–75)
PLATELET # BLD AUTO: 209 K/UL (ref 150–400)
POTASSIUM SERPL-SCNC: 4.1 MMOL/L (ref 3.5–5.1)
RBC # BLD AUTO: 4.66 M/UL (ref 3.8–5.2)
SODIUM SERPL-SCNC: 142 MMOL/L (ref 136–145)
WBC # BLD AUTO: 4.5 K/UL (ref 3.6–11)

## 2017-04-17 PROCEDURE — 80048 BASIC METABOLIC PNL TOTAL CA: CPT | Performed by: SURGERY

## 2017-04-17 PROCEDURE — 36415 COLL VENOUS BLD VENIPUNCTURE: CPT | Performed by: SURGERY

## 2017-04-17 PROCEDURE — 85025 COMPLETE CBC W/AUTO DIFF WBC: CPT | Performed by: SURGERY

## 2017-04-17 NOTE — PERIOP NOTES
[facility]  PREOPERATIVE INSTRUCTIONS    Surgery Date: 4/27/2017  Surgery arrival time given by surgeon: NO   If no,[facility] [address] staff will call you between 4 PM- 8 PM the day before surgery with your arrival time. If your surgery is on a Monday, we will call you the preceding Friday. Please call [phone] after 8 PM if you did not receive your arrival time. 1. Please report at the designated time to the 2nd [address] [facility]. Bring your insurance card, photo identification, and any copayment ( if applicable). 2. You must have a responsible adult to drive you home. You need to have a responsible adult to stay with you the first 24 hours after surgery if you are going home the same day of your surgery and you should not drive a car for 24 hours following your surgery. 3. Nothing to eat or drink after midnight the night before surgery. This includes no water, gum, mints, coffee, juice, etc.  Please note special instructions, if applicable, below for medications. 4. MEDICATIONS TO TAKE THE MORNING OF SURGERY WITH A SIP OF WATER: Levothyroxine  5. You MAY take your pain medication the day of surgery with a sip of water. 6. No alcoholic beverages 24 hours before or after your surgery. 7. If you are being admitted to the hospital,please leave personal belongings/luggage in your car until you have an assigned hospital room number. 8. Stop Aspirin and/or any non-steroidal anti-inflammatory drugs (i.e. Ibuprofen, Naproxen, Advil, Aleve) as directed by your prescribing physician. You may take Tylenol. Stop herbal supplements 1 week prior to  surgery. 9. If you are currently taking Plavix, Coumadin,or any other blood-thinning/anticoagulant medication contact your prescribing physician for instructions. 10. Please wear comfortable clothes. Wear your glasses instead of contacts. We ask that all money, jewelry and valuables be left at home. Wear no make up, particularly mascara, the day of surgery.    11.  All body piercings, rings,and jewelry need to be removed and left at home. Please wear your hair loose or down. Please no pony-tails, buns, or any metal hair accessories. If you shower the morning of surgery, please do not apply any lotions, powders, or deodorants afterwards. Do not shave any body area within 24 hours of your surgery. 12. Please follow all instructions to avoid any potential surgical cancellation. 13. Should your physical condition change, (i.e. fever, cold, flu, etc.) please notify your surgeon as soon as possible. 14. It is important to be on time. If a situation occurs where you may be delayed, please call:  [phone] on the day of surgery. 15. The Preadmission Testing staff can be reached at [phone]. Shan Leon 16. Special instructions:   1. Please bring your completed medication sheet with you the day of surgery. Please update any changes in medications. 2. Free  parking  The patient was contacted  in person. She  verbalize  understanding of all instructions does not  need reinforcement.

## 2017-04-26 ENCOUNTER — ANESTHESIA EVENT (OUTPATIENT)
Dept: SURGERY | Age: 42
End: 2017-04-26
Payer: COMMERCIAL

## 2017-04-27 ENCOUNTER — HOSPITAL ENCOUNTER (OUTPATIENT)
Age: 42
Setting detail: OUTPATIENT SURGERY
Discharge: HOME | End: 2017-04-27
Attending: SURGERY | Admitting: SURGERY
Payer: COMMERCIAL

## 2017-04-27 ENCOUNTER — ANESTHESIA (OUTPATIENT)
Dept: SURGERY | Age: 42
End: 2017-04-27
Payer: COMMERCIAL

## 2017-04-27 VITALS
HEART RATE: 65 BPM | RESPIRATION RATE: 9 BRPM | OXYGEN SATURATION: 98 % | TEMPERATURE: 97.6 F | DIASTOLIC BLOOD PRESSURE: 64 MMHG | SYSTOLIC BLOOD PRESSURE: 110 MMHG

## 2017-04-27 LAB — HCG UR QL: NEGATIVE

## 2017-04-27 PROCEDURE — 77030008684 HC TU ET CUF COVD -B: Performed by: ANESTHESIOLOGY

## 2017-04-27 PROCEDURE — 74011250636 HC RX REV CODE- 250/636: Performed by: SURGERY

## 2017-04-27 PROCEDURE — 77030026438 HC STYL ET INTUB CARD -A: Performed by: ANESTHESIOLOGY

## 2017-04-27 PROCEDURE — 77030002966 HC SUT PDS J&J -A: Performed by: SURGERY

## 2017-04-27 PROCEDURE — 77030035029 HC NDL INSUF VERES DISP COVD -B: Performed by: SURGERY

## 2017-04-27 PROCEDURE — 74011000250 HC RX REV CODE- 250: Performed by: ANESTHESIOLOGY

## 2017-04-27 PROCEDURE — 81025 URINE PREGNANCY TEST: CPT

## 2017-04-27 PROCEDURE — 74011250636 HC RX REV CODE- 250/636

## 2017-04-27 PROCEDURE — 77030032490 HC SLV COMPR SCD KNE COVD -B: Performed by: SURGERY

## 2017-04-27 PROCEDURE — 76060000034 HC ANESTHESIA 1.5 TO 2 HR: Performed by: SURGERY

## 2017-04-27 PROCEDURE — 77030010935 HC CLP LIG ABSRB TELE -B: Performed by: SURGERY

## 2017-04-27 PROCEDURE — 74011000250 HC RX REV CODE- 250

## 2017-04-27 PROCEDURE — 77030020268 HC MISC GENERAL SUPPLY: Performed by: SURGERY

## 2017-04-27 PROCEDURE — 77030013474 HC CRD BPLR DISP ADLR -A: Performed by: SURGERY

## 2017-04-27 PROCEDURE — 88304 TISSUE EXAM BY PATHOLOGIST: CPT | Performed by: SURGERY

## 2017-04-27 PROCEDURE — 74011000250 HC RX REV CODE- 250: Performed by: SURGERY

## 2017-04-27 PROCEDURE — 76010000875 HC OR TIME 1.5 TO 2HR INTENSV - TIER 2: Performed by: SURGERY

## 2017-04-27 PROCEDURE — 77030008557 HC TBNG SMK EVAC STOR -B: Performed by: SURGERY

## 2017-04-27 PROCEDURE — 77030010939 HC CLP LIG TELE -B: Performed by: SURGERY

## 2017-04-27 PROCEDURE — 77030019908 HC STETH ESOPH SIMS -A: Performed by: ANESTHESIOLOGY

## 2017-04-27 PROCEDURE — 77030035048 HC TRCR ENDOSC OPTCL COVD -B: Performed by: SURGERY

## 2017-04-27 PROCEDURE — 76210000021 HC REC RM PH II 0.5 TO 1 HR: Performed by: SURGERY

## 2017-04-27 PROCEDURE — 77030002933 HC SUT MCRYL J&J -A: Performed by: SURGERY

## 2017-04-27 PROCEDURE — 77030009852 HC PCH RTVR ENDOSC COVD -B: Performed by: SURGERY

## 2017-04-27 PROCEDURE — 77030011640 HC PAD GRND REM COVD -A: Performed by: SURGERY

## 2017-04-27 PROCEDURE — 77030035277 HC OBTRTR BLDELSS DISP INTU -B: Performed by: SURGERY

## 2017-04-27 PROCEDURE — 74011250636 HC RX REV CODE- 250/636: Performed by: ANESTHESIOLOGY

## 2017-04-27 PROCEDURE — 77030010149 HC SLV TRCR ENDOSC J&J -B: Performed by: SURGERY

## 2017-04-27 PROCEDURE — 77030013079 HC BLNKT BAIR HGGR 3M -A: Performed by: ANESTHESIOLOGY

## 2017-04-27 PROCEDURE — 77030016151 HC PROTCTR LNS DFOG COVD -B: Performed by: SURGERY

## 2017-04-27 PROCEDURE — 76210000017 HC OR PH I REC 1.5 TO 2 HR: Performed by: SURGERY

## 2017-04-27 PROCEDURE — 77030020782 HC GWN BAIR PAWS FLX 3M -B

## 2017-04-27 PROCEDURE — 77030035045 HC TRCR ENDOSC VRSPRT BLDLSS COVD -B: Performed by: SURGERY

## 2017-04-27 PROCEDURE — 77030010507 HC ADH SKN DERMBND J&J -B: Performed by: SURGERY

## 2017-04-27 RX ORDER — SODIUM CHLORIDE, SODIUM LACTATE, POTASSIUM CHLORIDE, CALCIUM CHLORIDE 600; 310; 30; 20 MG/100ML; MG/100ML; MG/100ML; MG/100ML
125 INJECTION, SOLUTION INTRAVENOUS CONTINUOUS
Status: DISCONTINUED | OUTPATIENT
Start: 2017-04-27 | End: 2017-04-27 | Stop reason: HOSPADM

## 2017-04-27 RX ORDER — MIDAZOLAM HYDROCHLORIDE 1 MG/ML
INJECTION, SOLUTION INTRAMUSCULAR; INTRAVENOUS AS NEEDED
Status: DISCONTINUED | OUTPATIENT
Start: 2017-04-27 | End: 2017-04-27 | Stop reason: HOSPADM

## 2017-04-27 RX ORDER — ONDANSETRON 2 MG/ML
INJECTION INTRAMUSCULAR; INTRAVENOUS AS NEEDED
Status: DISCONTINUED | OUTPATIENT
Start: 2017-04-27 | End: 2017-04-27 | Stop reason: HOSPADM

## 2017-04-27 RX ORDER — GLYCOPYRROLATE 0.2 MG/ML
INJECTION INTRAMUSCULAR; INTRAVENOUS AS NEEDED
Status: DISCONTINUED | OUTPATIENT
Start: 2017-04-27 | End: 2017-04-27 | Stop reason: HOSPADM

## 2017-04-27 RX ORDER — SUCCINYLCHOLINE CHLORIDE 20 MG/ML
INJECTION INTRAMUSCULAR; INTRAVENOUS AS NEEDED
Status: DISCONTINUED | OUTPATIENT
Start: 2017-04-27 | End: 2017-04-27 | Stop reason: HOSPADM

## 2017-04-27 RX ORDER — ROCURONIUM BROMIDE 10 MG/ML
INJECTION, SOLUTION INTRAVENOUS AS NEEDED
Status: DISCONTINUED | OUTPATIENT
Start: 2017-04-27 | End: 2017-04-27 | Stop reason: HOSPADM

## 2017-04-27 RX ORDER — SODIUM CHLORIDE 0.9 % (FLUSH) 0.9 %
5-10 SYRINGE (ML) INJECTION AS NEEDED
Status: DISCONTINUED | OUTPATIENT
Start: 2017-04-27 | End: 2017-04-27 | Stop reason: HOSPADM

## 2017-04-27 RX ORDER — ONDANSETRON 2 MG/ML
4 INJECTION INTRAMUSCULAR; INTRAVENOUS AS NEEDED
Status: DISCONTINUED | OUTPATIENT
Start: 2017-04-27 | End: 2017-04-27 | Stop reason: HOSPADM

## 2017-04-27 RX ORDER — LIDOCAINE HYDROCHLORIDE 10 MG/ML
0.1 INJECTION, SOLUTION EPIDURAL; INFILTRATION; INTRACAUDAL; PERINEURAL AS NEEDED
Status: DISCONTINUED | OUTPATIENT
Start: 2017-04-27 | End: 2017-04-27 | Stop reason: HOSPADM

## 2017-04-27 RX ORDER — FENTANYL CITRATE 50 UG/ML
INJECTION, SOLUTION INTRAMUSCULAR; INTRAVENOUS AS NEEDED
Status: DISCONTINUED | OUTPATIENT
Start: 2017-04-27 | End: 2017-04-27 | Stop reason: HOSPADM

## 2017-04-27 RX ORDER — DEXAMETHASONE SODIUM PHOSPHATE 4 MG/ML
INJECTION, SOLUTION INTRA-ARTICULAR; INTRALESIONAL; INTRAMUSCULAR; INTRAVENOUS; SOFT TISSUE AS NEEDED
Status: DISCONTINUED | OUTPATIENT
Start: 2017-04-27 | End: 2017-04-27 | Stop reason: HOSPADM

## 2017-04-27 RX ORDER — OXYCODONE AND ACETAMINOPHEN 5; 325 MG/1; MG/1
1 TABLET ORAL
Qty: 30 TAB | Refills: 0 | Status: SHIPPED | OUTPATIENT
Start: 2017-04-27 | End: 2017-07-25

## 2017-04-27 RX ORDER — HYDROMORPHONE HYDROCHLORIDE 1 MG/ML
.5-1 INJECTION, SOLUTION INTRAMUSCULAR; INTRAVENOUS; SUBCUTANEOUS
Status: DISCONTINUED | OUTPATIENT
Start: 2017-04-27 | End: 2017-04-27 | Stop reason: HOSPADM

## 2017-04-27 RX ORDER — NEOSTIGMINE METHYLSULFATE 1 MG/ML
INJECTION INTRAVENOUS AS NEEDED
Status: DISCONTINUED | OUTPATIENT
Start: 2017-04-27 | End: 2017-04-27 | Stop reason: HOSPADM

## 2017-04-27 RX ORDER — PROPOFOL 10 MG/ML
INJECTION, EMULSION INTRAVENOUS AS NEEDED
Status: DISCONTINUED | OUTPATIENT
Start: 2017-04-27 | End: 2017-04-27 | Stop reason: HOSPADM

## 2017-04-27 RX ORDER — BUPIVACAINE HYDROCHLORIDE 5 MG/ML
30 INJECTION, SOLUTION EPIDURAL; INTRACAUDAL ONCE
Status: COMPLETED | OUTPATIENT
Start: 2017-04-27 | End: 2017-04-27

## 2017-04-27 RX ORDER — SODIUM CHLORIDE 0.9 % (FLUSH) 0.9 %
5-10 SYRINGE (ML) INJECTION EVERY 8 HOURS
Status: DISCONTINUED | OUTPATIENT
Start: 2017-04-27 | End: 2017-04-27 | Stop reason: HOSPADM

## 2017-04-27 RX ORDER — CEFAZOLIN SODIUM IN 0.9 % NACL 2 G/50 ML
2 INTRAVENOUS SOLUTION, PIGGYBACK (ML) INTRAVENOUS
Status: DISCONTINUED | OUTPATIENT
Start: 2017-04-27 | End: 2017-04-27 | Stop reason: SDUPTHER

## 2017-04-27 RX ORDER — LIDOCAINE HYDROCHLORIDE 20 MG/ML
INJECTION, SOLUTION EPIDURAL; INFILTRATION; INTRACAUDAL; PERINEURAL AS NEEDED
Status: DISCONTINUED | OUTPATIENT
Start: 2017-04-27 | End: 2017-04-27 | Stop reason: HOSPADM

## 2017-04-27 RX ORDER — CEFAZOLIN SODIUM IN 0.9 % NACL 2 G/50 ML
2 INTRAVENOUS SOLUTION, PIGGYBACK (ML) INTRAVENOUS
Status: COMPLETED | OUTPATIENT
Start: 2017-04-27 | End: 2017-04-27

## 2017-04-27 RX ADMIN — SODIUM CHLORIDE, POTASSIUM CHLORIDE, SODIUM LACTATE AND CALCIUM CHLORIDE: 600; 310; 30; 20 INJECTION, SOLUTION INTRAVENOUS at 09:30

## 2017-04-27 RX ADMIN — MIDAZOLAM HYDROCHLORIDE 3 MG: 1 INJECTION, SOLUTION INTRAMUSCULAR; INTRAVENOUS at 10:12

## 2017-04-27 RX ADMIN — PROPOFOL 180 MG: 10 INJECTION, EMULSION INTRAVENOUS at 10:18

## 2017-04-27 RX ADMIN — DEXAMETHASONE SODIUM PHOSPHATE 4 MG: 4 INJECTION, SOLUTION INTRA-ARTICULAR; INTRALESIONAL; INTRAMUSCULAR; INTRAVENOUS; SOFT TISSUE at 10:30

## 2017-04-27 RX ADMIN — ROCURONIUM BROMIDE 5 MG: 10 INJECTION, SOLUTION INTRAVENOUS at 10:18

## 2017-04-27 RX ADMIN — FENTANYL CITRATE 50 MCG: 50 INJECTION, SOLUTION INTRAMUSCULAR; INTRAVENOUS at 10:34

## 2017-04-27 RX ADMIN — SUCCINYLCHOLINE CHLORIDE 100 MG: 20 INJECTION INTRAMUSCULAR; INTRAVENOUS at 10:18

## 2017-04-27 RX ADMIN — FENTANYL CITRATE 100 MCG: 50 INJECTION, SOLUTION INTRAMUSCULAR; INTRAVENOUS at 10:18

## 2017-04-27 RX ADMIN — GLYCOPYRROLATE 0.3 MG: 0.2 INJECTION INTRAMUSCULAR; INTRAVENOUS at 11:40

## 2017-04-27 RX ADMIN — SODIUM CHLORIDE, POTASSIUM CHLORIDE, SODIUM LACTATE AND CALCIUM CHLORIDE: 600; 310; 30; 20 INJECTION, SOLUTION INTRAVENOUS at 11:44

## 2017-04-27 RX ADMIN — NEOSTIGMINE METHYLSULFATE 3 MG: 1 INJECTION INTRAVENOUS at 11:40

## 2017-04-27 RX ADMIN — FENTANYL CITRATE 50 MCG: 50 INJECTION, SOLUTION INTRAMUSCULAR; INTRAVENOUS at 10:58

## 2017-04-27 RX ADMIN — ROCURONIUM BROMIDE 30 MG: 10 INJECTION, SOLUTION INTRAVENOUS at 10:27

## 2017-04-27 RX ADMIN — HYDROMORPHONE HYDROCHLORIDE 0.5 MG: 1 INJECTION, SOLUTION INTRAMUSCULAR; INTRAVENOUS; SUBCUTANEOUS at 12:25

## 2017-04-27 RX ADMIN — ONDANSETRON 4 MG: 2 INJECTION INTRAMUSCULAR; INTRAVENOUS at 12:11

## 2017-04-27 RX ADMIN — FENTANYL CITRATE 50 MCG: 50 INJECTION, SOLUTION INTRAMUSCULAR; INTRAVENOUS at 11:26

## 2017-04-27 RX ADMIN — LIDOCAINE HYDROCHLORIDE 50 MG: 20 INJECTION, SOLUTION EPIDURAL; INFILTRATION; INTRACAUDAL; PERINEURAL at 10:18

## 2017-04-27 RX ADMIN — HYDROMORPHONE HYDROCHLORIDE 1 MG: 1 INJECTION, SOLUTION INTRAMUSCULAR; INTRAVENOUS; SUBCUTANEOUS at 12:38

## 2017-04-27 RX ADMIN — ROCURONIUM BROMIDE 10 MG: 10 INJECTION, SOLUTION INTRAVENOUS at 10:59

## 2017-04-27 RX ADMIN — HYDROMORPHONE HYDROCHLORIDE 0.5 MG: 1 INJECTION, SOLUTION INTRAMUSCULAR; INTRAVENOUS; SUBCUTANEOUS at 12:15

## 2017-04-27 RX ADMIN — SODIUM CHLORIDE 12.5 MG: 9 INJECTION INTRAMUSCULAR; INTRAVENOUS; SUBCUTANEOUS at 12:26

## 2017-04-27 RX ADMIN — CEFAZOLIN 2 G: 1 INJECTION, POWDER, FOR SOLUTION INTRAMUSCULAR; INTRAVENOUS; PARENTERAL at 10:23

## 2017-04-27 RX ADMIN — ONDANSETRON 4 MG: 2 INJECTION INTRAMUSCULAR; INTRAVENOUS at 10:29

## 2017-04-27 NOTE — ANESTHESIA PREPROCEDURE EVALUATION
Anesthetic History   No history of anesthetic complications            Review of Systems / Medical History  Patient summary reviewed, nursing notes reviewed and pertinent labs reviewed    Pulmonary            Asthma : well controlled       Neuro/Psych   Within defined limits           Cardiovascular  Within defined limits                     GI/Hepatic/Renal  Within defined limits              Endo/Other      Hypothyroidism  Blood dyscrasia     Other Findings              Physical Exam    Airway  Mallampati: II  TM Distance: 4 - 6 cm  Neck ROM: normal range of motion   Mouth opening: Normal     Cardiovascular    Rhythm: regular  Rate: normal         Dental  No notable dental hx       Pulmonary  Breath sounds clear to auscultation               Abdominal         Other Findings            Anesthetic Plan    ASA: 2  Anesthesia type: general            Anesthetic plan and risks discussed with: Patient

## 2017-04-27 NOTE — ANESTHESIA POSTPROCEDURE EVALUATION
Post-Anesthesia Evaluation and Assessment    Patient: Ruth Steel MRN: [id number]  SSN: [id number]    YOB: 1975  Age: 46 y.o. Sex: female       Cardiovascular Function/Vital Signs  Visit Vitals    /76    Pulse 69    Temp 36.4 °C (97.6 °F)    Resp 11    SpO2 100%    Breastfeeding Yes       Patient is status post general anesthesia for Procedure(s):  ROBOTIC ASSISTED LAPAROSCOPIC MULTIPORT CHOLECYSTECTOMY WITH INDOCYANINE GREEN. Nausea/Vomiting: None    Postoperative hydration reviewed and adequate. Pain:  Pain Scale 1: Numeric (0 - 10) (04/27/17 1213)  Pain Intensity 1: 6 (04/27/17 1213)   Managed    Neurological Status:   Neuro (WDL): Exceptions to WDL (04/27/17 1157)  Neuro  Neurologic State: Pharmacologically induced (comment) (04/27/17 1157)   At baseline    Mental Status and Level of Consciousness: Arousable    Pulmonary Status:   O2 Device: Nasal cannula (04/27/17 1205)   Adequate oxygenation and airway patent    Complications related to anesthesia: None    Post-anesthesia assessment completed.  No concerns    Signed By: Mack Barrios MD     April 27, 2017

## 2017-04-27 NOTE — BRIEF OP NOTE
BRIEF OPERATIVE NOTE    Date of Procedure: 4/27/2017   Preoperative Diagnosis: CHOLELITHIASIS  Postoperative Diagnosis: CHOLELITHIASIS    Procedure(s):  ROBOTIC ASSISTED LAPAROSCOPIC MULTIPORT CHOLECYSTECTOMY WITH INDOCYANINE GREEN  Surgeon(s) and Role:     * Malaika Young MD - Primary         Assistant Staff:       Surgical Staff:  Circ-1: Chan Acosta RN  Circ-Relief: Kristi Rodrigues RN  Scrub Tech-1: Lizbeth Limb  Scrub RN-Relief: Yue Salcedo  Surg Asst-1: Margie Rankin LPN  Float Staff:  Yue Salcedo  Event Time In   Incision Start 1035   Incision Close      Anesthesia: General   Estimated Blood Loss: none  Specimens:   ID Type Source Tests Collected by Time Destination   1 : GALLBLADDER Preservative Abdomen  Malaika Young MD 4/27/2017 1119 Pathology      Findings: cholelithiasis   Complications: none  Implants: * No implants in log *

## 2017-04-27 NOTE — DISCHARGE INSTRUCTIONS
POST-OPERATIVE INSTRUCTIONS  OUTPATIENT SURGERY ROBOTIC LAPAROSCOPIC CHOLECYSTECTOMY    Today you underwent a robotic assisted laparoscopic cholecystectomy which is usually well tolerated. However, below is a list of instructions which are important for you to follow. If you have any questions, please call your surgeons office. 1 EATING: Please eat lightly when you go home today for the first 24 hours. You may resume your regular diet after that. 2. EXERCISE: Limit your exercise for the first week, although stairs or other walking is fine. No strenuous activity (No lifting >10-15lbs) for one month. 3. DRESSING: You may shower in 1 days, unless otherwise instructed by your surgeon. 4. NO LIFTING: Until you have seen your surgeon in his/her office following surgery, at which time you will receive further instructions. 5. DRIVING: No driving while taking prescription pain medicine, and until post-operative discomfort resolves. Usually you may begin driving within 1-2 weeks. 6. PAIN: A prescription for pain has been given to you or your family. Please use it as prescribed and if this is inadequate, please call your surgeons office. In some cases, Tylenol or Advil may be adequate; and in that case, you will not need to fill the prescription. Please call for any refills during office hours. Pain medication may cause constipation - Colace or Milk of Magnesia may be used as needed. 7. WOUND: If you notice any increased redness, swelling, pain or fever, please call the office. If this is noticed at a time after normal office hours, please call our answering service at [phone]. The  will then contact your surgeon or the SURGICAL ASSOCIATE on call. 8. APPOINTMENT: Your surgeon would like to see you in his/her in 14 days. If this appointment has not been made for you prior to your departure from Outpatient Surgery, please call your surgeons office to schedule your appointment.  If you have any questions or concerns, please do not hesitate to call your surgeon or any other staff member who will be able to assist you. [facility] IN GENERAL, VASCULAR, ONCOLOGIC, BREAST AND COLON & RECTAL SURGERY  [email or web address]    DISCHARGE SUMMARY from your Nurse    The following personal items collected during your admission are returned to you:   Dental Appliance: Dental Appliances: None  Vision: Visual Aid: Contacts  Hearing Aid:    Jewelry:    Clothing: Clothing: Footwear, Pants, Undergarments  Other Valuables: Other Valuables: None  Valuables sent to safe:      PATIENT INSTRUCTIONS:    After general anesthesia or intravenous sedation, for 24 hours or while taking prescription Narcotics:  · Limit your activities  · Do not drive and operate hazardous machinery  · Do not make important personal or business decisions  · Do  not drink alcoholic beverages  · If you have not urinated within 8 hours after discharge, please contact your surgeon on call. Report the following to your surgeon:  · Excessive pain, swelling, redness or odor of or around the surgical area  · Temperature over 100.5  · Nausea and vomiting lasting longer than 4 hours or if unable to take medications  · Any signs of decreased circulation or nerve impairment to extremity: change in color, persistent  numbness, tingling, coldness or increase pain  · Any questions    COUGH AND DEEP BREATHE    Breathing deep and coughing are very important exercises to do after surgery. Deep breathing and coughing open the little air tubes and air sacks in your lungs. You take deep breaths every day. You may not even notice - it is just something you do when you sigh or yawn. It is a natural exercise you do to keep these air passages open. After surgery, take deep breaths and cough, on purpose. Coughing and deep breathing help prevent bronchitis and pneumonia after surgery.   If you had chest or belly surgery, use a pillow as a \"hug buddy\" and hold it tightly to your chest or belly when you cough. DIRECTIONS:  6. Take 10 to 15 slow deep breaths every hour while awake. 7. Breathe in deeply, and hold it for 2 seconds. 8. Exhale slowly through puckered lips, like blowing up a balloon. 9. After every 4th or 5th deep breath, hug your pillow to your chest or belly and give a hard, deep cough. Yes, it will probably hurt. But doing this exercise is very important part of healing after surgery. Take your pain medicine to help you do this exercise without too much pain. IF YOU HAVE BEEN DIAGNOSED WITH SLEEP APNEA, PLEASE USE YOUR SLEEIFP APNEA DEVICE OR CPAP MACHINE WHEN YOU INTEND TO NAP AFTER TAKING PAIN MEDICATION. Ankle Pumps    Ankle pumps increase the circulation of oxygenated blood to your lower extremities and decrease your risk for circulation problems such as blood clots. They also stretch the muscles, tendons and ligaments in your foot and ankle, and prevent joint contracture in the ankle and foot, especially after surgeries on the legs. It is important to do ankle pump exercises regularly after surgery because immobility increases your risk for developing a blood clot. Your doctor may also have you take an Aspirin for the next few days as well. If your doctor did not ask you to take an Aspirin, consult with him before starting Aspirin therapy on your own. Slowly point your foot forward, feeling the muscles on the top of your lower leg stretch, and hold this position for 5 seconds. Next, pull your foot back toward you as far as possible, stretching the calf muscles, and hold that position for 5 seconds. Repeat with the other foot. Perform 10 repetitions every hour while awake for both ankles if possible (down and then up with the foot once is one repetition). You should feel gentle stretching of the muscles in your lower leg when doing this exercise.   If you feel pain, or your range of motion is limited, don't  Push too hard. Only go the limit your joint and muscles will let you go. If you have increasing pain, progressively worsening leg warmth or swelling, STOP the exercise and call your doctor. Below is information about the medications your doctor is prescribing after your visit:    Other information in your discharge envelope:  []     PRESCRIPTIONS  []     PHYSICAL THERAPY PRESCRIPTION  []     APPOINTMENT CARDS  []     Regional Anesthesia Pamphlet for block or block with On-Q Catheter from Anesthesia Service  []     Medical device information sheets/pamphlets from their    []     School/work excuse note. []     /parent work excuse note. These are general instructions for a healthy lifestyle:    *  Please give a list of your current medications to your Primary Care Provider. *  Please update this list whenever your medications are discontinued, doses are      changed, or new medications (including over-the-counter products) are added. *  Please carry medication information at all times in case of emergency situations. About Smoking  No smoking / No tobacco products / Avoid exposure to second hand smoke    Surgeon General's Warning:  Quitting smoking now greatly reduces serious risk to your health. Obesity, smoking, and sedentary lifestyle greatly increases your risk for illness and disease. A healthy diet, regular physical exercise & weight monitoring are important for maintaining a healthy lifestyle. Congestive Heart Failure  You may be retaining fluid if you have a history of heart failure or if you experience any of the following symptoms:  Weight gain of 3 pounds or more overnight or 5 pounds in a week, increased swelling in our hands or feet or shortness of breath while lying flat in bed. Please call your doctor as soon as you notice any of these symptoms; do not wait until your next office visit.     Recognize signs and symptoms of STROKE:  F - face looks uneven  A - arms unable to move or move even  S - speech slurred or non-existent  T - time-call 911 as soon as signs and symptoms begin-DO NOT go         Back to bed or wait to see if you get better-TIME IS BRAIN. Warning signs of HEART ATTACK  Call 911 if you have these symptoms    · Chest discomfort. Most heart attacks involve discomfort in the center of the chest that lasts more than a few minutes, or that goes away and comes back. It can feel like uncomfortable pressure, squeezing, fullness, or pain. · Discomfort in other areas of the upper body. Symptoms can include pain or discomfort in one or both        Arms, the back, neck, jaw, or stomach. ·  Shortness of breath with or without chest discomfort. · Other signs may include breaking out in a cold sweat, nausea, or lightheadedness    Don't wait more than five minutes to call 911 - MINUTES MATTER! Fast action can save your life. Calling 911 is almost always the fastest way to get lifesaving treatment. Emergency Medical Services staff can begin treatment when they arrive - up to an hour sooner than if someone gets to the hospital by car. [facility] MEDICATION AND SIDE EFFECT GUIDE    The [facility] MEDICATION AND SIDE EFFECT GUIDE was provided to the PATIENT AND CARE PROVIDER.   Information provided includes instruction about drug purpose and common side effects for the following medications:    · percocet

## 2017-04-27 NOTE — IP AVS SNAPSHOT
Tobias Lasso 
 
 
 [address] 
[phone] Patient: Chris Underwood MRN: [id number] :1975 You are allergic to the following Allergen Reactions Hydrocodone Shortness of Breath Vertigo Omeprazole Hives Quibron (Theophylline-Guaifenesin) Other (comments) Hallucinations and violent. Tape (Adhesive) Rash Recent Documentation Breastfeeding? OB Status Smoking Status Yes Having regular periods Never Smoker Emergency Contacts Name Discharge Info Relation Home Work Mobile [facility] DISCHARGE CAREGIVER [3] Spouse [3] [phone] About your hospitalization You were admitted on:  2017 You last received care in the:  [facility] PACU You were discharged on:  2017 Unit phone number:  [phone] Why you were hospitalized Your primary diagnosis was:  Not on File Providers Seen During Your Hospitalizations Provider Role Specialty Primary office phone Claudene Byars, MD Attending Provider General Surgery [phone] Your Primary Care Physician (PCP) Primary Care Physician Office Phone Office Fax SUDHEER ZAPATA [phone] ** None ** Follow-up Information Follow up With Details Comments Contact Info Liz Young, [address] [place] [address] [employer] [address] [place] [address] 
[phone] Current Discharge Medication List  
  
START taking these medications Dose & Instructions Dispensing Information Comments Morning Noon Evening Bedtime  
 oxyCODONE-acetaminophen 5-325 mg per tablet Commonly known as:  PERCOCET Your last dose was: Your next dose is:    
   
   
 Dose:  1 Tab Take 1 Tab by mouth every four (4) hours as needed for Pain. Max Daily Amount: 6 Tabs. Quantity:  30 Tab Refills:  0 CONTINUE these medications which have NOT CHANGED Dose & Instructions Dispensing Information Comments Morning Noon Evening Bedtime  
 levothyroxine 112 mcg tablet Commonly known as:  SYNTHROID Your last dose was: Your next dose is:    
   
   
 Dose:  112 mcg Take 1 Tab by mouth Daily (before breakfast). Lower dose Quantity:  90 Tab Refills:  1  
     
   
   
   
  
 metFORMIN 500 mg tablet Commonly known as:  GLUCOPHAGE Your last dose was: Your next dose is:    
   
   
 Dose:  500 mg Take 1 Tab by mouth two (2) times daily (with meals). Quantity:  60 Tab Refills:  3 PRENATAL DHA+COMPLETE PRENATAL -300 mg-mcg-mg Cmpk Generic drug:  PNV no.24-iron-folic acid-dha Your last dose was: Your next dose is: Take  by mouth. Refills:  0 Where to Get Your Medications Information on where to get these meds will be given to you by the nurse or doctor. ! Ask your nurse or doctor about these medications  
  oxyCODONE-acetaminophen 5-325 mg per tablet Discharge Instructions POST-OPERATIVE INSTRUCTIONS 
OUTPATIENT SURGERY ROBOTIC LAPAROSCOPIC CHOLECYSTECTOMY Today you underwent a robotic assisted laparoscopic cholecystectomy which is usually well tolerated. However, below is a list of instructions which are important for you to follow. If you have any questions, please call your surgeons office. 1 EATING: Please eat lightly when you go home today for the first 24 hours. You may resume your regular diet after that. 2. EXERCISE: Limit your exercise for the first week, although stairs or other walking is fine. No strenuous activity (No lifting >10-15lbs) for one month. 3. DRESSING: You may shower in 1 days, unless otherwise instructed by your surgeon. 4. NO LIFTING: Until you have seen your surgeon in his/her office following surgery, at which time you will receive further instructions. 5. DRIVING: No driving while taking prescription pain medicine, and until post-operative discomfort resolves. Usually you may begin driving within 1-2 weeks. 6. PAIN: A prescription for pain has been given to you or your family. Please use it as prescribed and if this is inadequate, please call your surgeons office. In some cases, Tylenol or Advil may be adequate; and in that case, you will not need to fill the prescription. Please call for any refills during office hours. Pain medication may cause constipation - Colace or Milk of Magnesia may be used as needed. 7. WOUND: If you notice any increased redness, swelling, pain or fever, please call the office. If this is noticed at a time after normal office hours, please call our answering service at [phone]. The  will then contact your surgeon or the SURGICAL ASSOCIATE on call. 8. APPOINTMENT: Your surgeon would like to see you in his/her in 14 days. If this appointment has not been made for you prior to your departure from Outpatient Surgery, please call your surgeons office to schedule your appointment. If you have any questions or concerns, please do not hesitate to call your surgeon or any other staff member who will be able to assist you. EXCELLENCE IN GENERAL, VASCULAR, ONCOLOGIC, BREAST AND COLON & RECTAL SURGERY [email or web address] DISCHARGE SUMMARY from your Nurse The following personal items collected during your admission are returned to you:  
Dental Appliance: Dental Appliances: None Vision: Visual Aid: Contacts Hearing Aid:   
Jewelry:   
Clothing: Clothing: Footwear, Pants, Undergarments Other Valuables: Other Valuables: None Valuables sent to safe:   
 
PATIENT INSTRUCTIONS: 
 
After general anesthesia or intravenous sedation, for 24 hours or while taking prescription Narcotics: · Limit your activities · Do not drive and operate hazardous machinery · Do not make important personal or business decisions · Do  not drink alcoholic beverages · If you have not urinated within 8 hours after discharge, please contact your surgeon on call. Report the following to your surgeon: 
· Excessive pain, swelling, redness or odor of or around the surgical area · Temperature over 100.5 · Nausea and vomiting lasting longer than 4 hours or if unable to take medications · Any signs of decreased circulation or nerve impairment to extremity: change in color, persistent  numbness, tingling, coldness or increase pain · Any questions [address] Breathing deep and coughing are very important exercises to do after surgery. Deep breathing and coughing open the little air tubes and air sacks in your lungs. You take deep breaths every day. You may not even notice - it is just something you do when you sigh or yawn. It is a natural exercise you do to keep these air passages open. After surgery, take deep breaths and cough, on purpose. Coughing and deep breathing help prevent bronchitis and pneumonia after surgery. If you had chest or belly surgery, use a pillow as a \"hug buddy\" and hold it tightly to your chest or belly when you cough. DIRECTIONS: 
6. Take 10 to 15 slow deep breaths every hour while awake. 7. Breathe in deeply, and hold it for 2 seconds. 8. Exhale slowly through puckered lips, like blowing up a balloon. 9. After every 4th or 5th deep breath, hug your pillow to your chest or belly and give a hard, deep cough. Yes, it will probably hurt. But doing this exercise is very important part of healing after surgery. Take your pain medicine to help you do this exercise without too much pain.  
 
IF YOU HAVE BEEN DIAGNOSED WITH SLEEP APNEA, PLEASE USE YOUR SLEEIFP APNEA DEVICE OR CPAP MACHINE WHEN YOU INTEND TO NAP AFTER TAKING PAIN MEDICATION. Ankle Pumps Ankle pumps increase the circulation of oxygenated blood to your lower extremities and decrease your risk for circulation problems such as blood clots. They also stretch the muscles, tendons and ligaments in your foot and ankle, and prevent joint contracture in the ankle and foot, especially after surgeries on the legs. It is important to do ankle pump exercises regularly after surgery because immobility increases your risk for developing a blood clot. Your doctor may also have you take an Aspirin for the next few days as well. If your doctor did not ask you to take an Aspirin, consult with him before starting Aspirin therapy on your own. Slowly point your foot forward, feeling the muscles on the top of your lower leg stretch, and hold this position for 5 seconds. Next, pull your foot back toward you as far as possible, stretching the calf muscles, and hold that position for 5 seconds. Repeat with the other foot. Perform 10 repetitions every hour while awake for both ankles if possible (down and then up with the foot once is one repetition). You should feel gentle stretching of the muscles in your lower leg when doing this exercise. If you feel pain, or your range of motion is limited, don't  Push too hard. Only go the limit your joint and muscles will let you go. If you have increasing pain, progressively worsening leg warmth or swelling, STOP the exercise and call your doctor. Below is information about the medications your doctor is prescribing after your visit: 
 
 
· percocet Discharge Orders None General Information Please provide this summary of care documentation to your next provider. Patient Signature:  ____________________________________________________________ Date:  ____________________________________________________________  
  
[facility] Janine Provider Signature:  ____________________________________________________________ Date:  ____________________________________________________________

## 2017-04-27 NOTE — OP NOTES
Patient: Mary Sahu MRN: [id number]  SSN: [id number]    YOB: 1975  Age: 46 y.o. Sex: female        OPERATIVE REPORT - LAPAROSCOPIC ASSISTED ROBOTIC                                                                  MULTIPORT CHOLECYSTECTOMY    PREOPERATIVE DIAGNOSIS: Symptomatic cholelithiasis. POSTOPERATIVE DIAGNOSIS: Cholelithiasis     OPERATIVE PROCEDURE:  Robotic assisted laparoscopic multiport cholecystectomy. SURGEON: Tiffany Castillo MD    ANESTHESIA: General.    ANESTHESIOLOGIST: General    COMPLICATIONS: None    ESTIMATED BLOOD LOSS: Minimal.    INDICATION: Documented in the history and physical.    FINDINGS: cholelithiasis    PROCEDURE: Patient was brought in the room and placed supine on the operating table. After general anesthesia induction and placement of endotracheal tube, patient's  abdomen was prepped and draped in standard surgical fashion. The  laparoscopic camera and CO2 insufflation apparatus were affixed to the  drapes in the usual fashion. Through a supraumbilical incision, a Veress needle was introduced and its  intraperitoneal position was confirmed with low intra-abdominal initial pressures. CO2 insufflation was connected and pneumoperitoneum was created and maintained at 15 mmHg. An 8-mm robotic trocar was introduced and 8mm robotic camera  Was passed through and immediate area was inspected and it was confirmed that there was no  intraabdominal injury during introduction of pneumoperitoneum and the  trocar. Under direct vision, 2 8-mm robotic ports were positioned, one to the  Left upper quadrant, one at the right upper quadrant, and another 5mm port in the right  flank. Patient was positioned in reverse Trendelenburg with a tilt to the  left. The fundus was grasped and lifted up towards the diaphragm. The  infundibulum was retracted laterally and inferiorly after releasing the  adhesions.     The junction of the cystic duct and gallbladder was clearly identified, and  an adequate length of the cystic duct was dissected out. Similarly, the cystic  artery was also dissected out and an adequate length was displayed. Critical view of Calot's triangle was obtained and the structures were clearly identified. After this was satisfactorily done, the cystic duct was doubly clipped on proximal side and single clip on distal side and divided. The cystic artery was  Divided with bovie cautery as it was very small in diameter. The gallbladder was then gently dissected off from the liver bed  using electrocautery and achieving hemostasis. as the dissection proceeded  upwards towards the fundus. The gallbladder was thus removed from its bed. Using a 5mm Endobag through the left upper quadrant port, the gallbladder  was removed intact. Liver bed was inspected. No bleeding or bile leak was noted. The left upper quadrant port fascia was closed with 0 Vicryl with a figure of 8 stitch using a strortz device. All incisions had skin approximated with subcuticular 4-0 Vicryl   with Dermabond to the skin. Marcaine  was infiltrated into each port site. SPECIMEN: Gallbladder. COUNTS: Correct at the completion of surgery.     Ela Syed MD

## 2017-05-01 ENCOUNTER — APPOINTMENT (OUTPATIENT)
Dept: CT IMAGING | Age: 42
End: 2017-05-01
Attending: EMERGENCY MEDICINE
Payer: COMMERCIAL

## 2017-05-01 ENCOUNTER — HOSPITAL ENCOUNTER (EMERGENCY)
Age: 42
Discharge: HOME | End: 2017-05-01
Attending: EMERGENCY MEDICINE | Admitting: EMERGENCY MEDICINE
Payer: COMMERCIAL

## 2017-05-01 ENCOUNTER — APPOINTMENT (OUTPATIENT)
Dept: ULTRASOUND IMAGING | Age: 42
End: 2017-05-01
Attending: EMERGENCY MEDICINE
Payer: COMMERCIAL

## 2017-05-01 VITALS
DIASTOLIC BLOOD PRESSURE: 78 MMHG | SYSTOLIC BLOOD PRESSURE: 136 MMHG | RESPIRATION RATE: 16 BRPM | BODY MASS INDEX: 37.96 KG/M2 | HEART RATE: 77 BPM | HEIGHT: 67 IN | OXYGEN SATURATION: 93 % | WEIGHT: 241.84 LBS | TEMPERATURE: 97.3 F

## 2017-05-01 DIAGNOSIS — R10.11 RUQ ABDOMINAL PAIN: Primary | ICD-10-CM

## 2017-05-01 DIAGNOSIS — G89.18 POST-OP PAIN: ICD-10-CM

## 2017-05-01 LAB
ALBUMIN SERPL BCP-MCNC: 3.7 G/DL (ref 3.5–5)
ALBUMIN/GLOB SERPL: 0.9 {RATIO} (ref 1.1–2.2)
ALP SERPL-CCNC: 101 U/L (ref 45–117)
ALT SERPL-CCNC: 61 U/L (ref 12–78)
ANION GAP BLD CALC-SCNC: 11 MMOL/L (ref 5–15)
APPEARANCE UR: ABNORMAL
AST SERPL W P-5'-P-CCNC: 30 U/L (ref 15–37)
BACTERIA URNS QL MICRO: ABNORMAL /HPF
BASOPHILS # BLD AUTO: 0 K/UL (ref 0–0.1)
BASOPHILS # BLD: 0 % (ref 0–1)
BILIRUB SERPL-MCNC: 0.4 MG/DL (ref 0.2–1)
BILIRUB UR QL: NEGATIVE
BUN SERPL-MCNC: 8 MG/DL (ref 6–20)
BUN/CREAT SERPL: 9 (ref 12–20)
CALCIUM SERPL-MCNC: 8.7 MG/DL (ref 8.5–10.1)
CAOX CRY URNS QL MICRO: ABNORMAL
CHLORIDE SERPL-SCNC: 102 MMOL/L (ref 97–108)
CO2 SERPL-SCNC: 25 MMOL/L (ref 21–32)
COLOR UR: ABNORMAL
CREAT SERPL-MCNC: 0.93 MG/DL (ref 0.55–1.02)
EOSINOPHIL # BLD: 0.2 K/UL (ref 0–0.4)
EOSINOPHIL NFR BLD: 4 % (ref 0–7)
EPITH CASTS URNS QL MICRO: ABNORMAL /LPF
ERYTHROCYTE [DISTWIDTH] IN BLOOD BY AUTOMATED COUNT: 14.8 % (ref 11.5–14.5)
GLOBULIN SER CALC-MCNC: 4.2 G/DL (ref 2–4)
GLUCOSE SERPL-MCNC: 92 MG/DL (ref 65–100)
GLUCOSE UR STRIP.AUTO-MCNC: NEGATIVE MG/DL
HCG UR QL: NEGATIVE
HCT VFR BLD AUTO: 40 % (ref 35–47)
HGB BLD-MCNC: 12.4 G/DL (ref 11.5–16)
HGB UR QL STRIP: NEGATIVE
KETONES UR QL STRIP.AUTO: NEGATIVE MG/DL
LEUKOCYTE ESTERASE UR QL STRIP.AUTO: NEGATIVE
LIPASE SERPL-CCNC: 212 U/L (ref 73–393)
LYMPHOCYTES # BLD AUTO: 21 % (ref 12–49)
LYMPHOCYTES # BLD: 1.3 K/UL (ref 0.8–3.5)
MCH RBC QN AUTO: 25.8 PG (ref 26–34)
MCHC RBC AUTO-ENTMCNC: 31 G/DL (ref 30–36.5)
MCV RBC AUTO: 83.2 FL (ref 80–99)
MONOCYTES # BLD: 0.6 K/UL (ref 0–1)
MONOCYTES NFR BLD AUTO: 10 % (ref 5–13)
MUCOUS THREADS URNS QL MICRO: ABNORMAL /LPF
NEUTS SEG # BLD: 3.9 K/UL (ref 1.8–8)
NEUTS SEG NFR BLD AUTO: 65 % (ref 32–75)
NITRITE UR QL STRIP.AUTO: NEGATIVE
PH UR STRIP: 6 [PH] (ref 5–8)
PLATELET # BLD AUTO: 221 K/UL (ref 150–400)
POTASSIUM SERPL-SCNC: 3.8 MMOL/L (ref 3.5–5.1)
PROT SERPL-MCNC: 7.9 G/DL (ref 6.4–8.2)
PROT UR STRIP-MCNC: NEGATIVE MG/DL
RBC # BLD AUTO: 4.81 M/UL (ref 3.8–5.2)
RBC #/AREA URNS HPF: ABNORMAL /HPF (ref 0–5)
SODIUM SERPL-SCNC: 138 MMOL/L (ref 136–145)
SP GR UR REFRACTOMETRY: 1.02 (ref 1–1.03)
UROBILINOGEN UR QL STRIP.AUTO: 0.2 EU/DL (ref 0.2–1)
WBC # BLD AUTO: 6 K/UL (ref 3.6–11)
WBC URNS QL MICRO: ABNORMAL /HPF (ref 0–4)

## 2017-05-01 PROCEDURE — 36415 COLL VENOUS BLD VENIPUNCTURE: CPT | Performed by: EMERGENCY MEDICINE

## 2017-05-01 PROCEDURE — 80053 COMPREHEN METABOLIC PANEL: CPT | Performed by: EMERGENCY MEDICINE

## 2017-05-01 PROCEDURE — 96374 THER/PROPH/DIAG INJ IV PUSH: CPT

## 2017-05-01 PROCEDURE — 74011250636 HC RX REV CODE- 250/636: Performed by: PHYSICIAN ASSISTANT

## 2017-05-01 PROCEDURE — 96376 TX/PRO/DX INJ SAME DRUG ADON: CPT

## 2017-05-01 PROCEDURE — 74011250636 HC RX REV CODE- 250/636: Performed by: EMERGENCY MEDICINE

## 2017-05-01 PROCEDURE — 99284 EMERGENCY DEPT VISIT MOD MDM: CPT

## 2017-05-01 PROCEDURE — 71275 CT ANGIOGRAPHY CHEST: CPT

## 2017-05-01 PROCEDURE — 83690 ASSAY OF LIPASE: CPT | Performed by: EMERGENCY MEDICINE

## 2017-05-01 PROCEDURE — 81001 URINALYSIS AUTO W/SCOPE: CPT | Performed by: SURGERY

## 2017-05-01 PROCEDURE — 96375 TX/PRO/DX INJ NEW DRUG ADDON: CPT

## 2017-05-01 PROCEDURE — 74011636320 HC RX REV CODE- 636/320: Performed by: RADIOLOGY

## 2017-05-01 PROCEDURE — 85025 COMPLETE CBC W/AUTO DIFF WBC: CPT | Performed by: EMERGENCY MEDICINE

## 2017-05-01 PROCEDURE — 81025 URINE PREGNANCY TEST: CPT

## 2017-05-01 PROCEDURE — 74177 CT ABD & PELVIS W/CONTRAST: CPT

## 2017-05-01 PROCEDURE — 96361 HYDRATE IV INFUSION ADD-ON: CPT

## 2017-05-01 PROCEDURE — 76705 ECHO EXAM OF ABDOMEN: CPT

## 2017-05-01 PROCEDURE — 74011250636 HC RX REV CODE- 250/636: Performed by: SURGERY

## 2017-05-01 RX ORDER — HYDROMORPHONE HYDROCHLORIDE 1 MG/ML
1 INJECTION, SOLUTION INTRAMUSCULAR; INTRAVENOUS; SUBCUTANEOUS ONCE
Status: COMPLETED | OUTPATIENT
Start: 2017-05-01 | End: 2017-05-01

## 2017-05-01 RX ORDER — ONDANSETRON 2 MG/ML
4 INJECTION INTRAMUSCULAR; INTRAVENOUS
Status: COMPLETED | OUTPATIENT
Start: 2017-05-01 | End: 2017-05-01

## 2017-05-01 RX ORDER — DIAZEPAM 10 MG/2ML
5 INJECTION INTRAMUSCULAR ONCE
Status: COMPLETED | OUTPATIENT
Start: 2017-05-01 | End: 2017-05-01

## 2017-05-01 RX ORDER — HYDROMORPHONE HYDROCHLORIDE 1 MG/ML
0.5 INJECTION, SOLUTION INTRAMUSCULAR; INTRAVENOUS; SUBCUTANEOUS ONCE
Status: COMPLETED | OUTPATIENT
Start: 2017-05-01 | End: 2017-05-01

## 2017-05-01 RX ORDER — HYDROMORPHONE HYDROCHLORIDE 2 MG/1
1 TABLET ORAL
Qty: 10 TAB | Refills: 0 | Status: SHIPPED | OUTPATIENT
Start: 2017-05-01 | End: 2017-07-25

## 2017-05-01 RX ORDER — SODIUM CHLORIDE 0.9 % (FLUSH) 0.9 %
5-10 SYRINGE (ML) INJECTION EVERY 8 HOURS
Status: DISCONTINUED | OUTPATIENT
Start: 2017-05-01 | End: 2017-05-01 | Stop reason: HOSPADM

## 2017-05-01 RX ORDER — SODIUM CHLORIDE 0.9 % (FLUSH) 0.9 %
5-10 SYRINGE (ML) INJECTION AS NEEDED
Status: DISCONTINUED | OUTPATIENT
Start: 2017-05-01 | End: 2017-05-01 | Stop reason: HOSPADM

## 2017-05-01 RX ORDER — KETOROLAC TROMETHAMINE 30 MG/ML
30 INJECTION, SOLUTION INTRAMUSCULAR; INTRAVENOUS
Status: COMPLETED | OUTPATIENT
Start: 2017-05-01 | End: 2017-05-01

## 2017-05-01 RX ORDER — ONDANSETRON 4 MG/1
4 TABLET, ORALLY DISINTEGRATING ORAL
Qty: 15 TAB | Refills: 0 | Status: SHIPPED | OUTPATIENT
Start: 2017-05-01 | End: 2017-07-25

## 2017-05-01 RX ADMIN — KETOROLAC TROMETHAMINE 30 MG: 30 INJECTION, SOLUTION INTRAMUSCULAR at 08:57

## 2017-05-01 RX ADMIN — HYDROMORPHONE HYDROCHLORIDE 1 MG: 1 INJECTION, SOLUTION INTRAMUSCULAR; INTRAVENOUS; SUBCUTANEOUS at 05:29

## 2017-05-01 RX ADMIN — HYDROMORPHONE HYDROCHLORIDE 0.5 MG: 1 INJECTION, SOLUTION INTRAMUSCULAR; INTRAVENOUS; SUBCUTANEOUS at 08:57

## 2017-05-01 RX ADMIN — IOPAMIDOL 100 ML: 755 INJECTION, SOLUTION INTRAVENOUS at 07:18

## 2017-05-01 RX ADMIN — ONDANSETRON 4 MG: 2 INJECTION INTRAMUSCULAR; INTRAVENOUS at 05:28

## 2017-05-01 RX ADMIN — SODIUM CHLORIDE 1000 ML: 900 INJECTION, SOLUTION INTRAVENOUS at 05:32

## 2017-05-01 RX ADMIN — HYDROMORPHONE HYDROCHLORIDE 1 MG: 1 INJECTION, SOLUTION INTRAMUSCULAR; INTRAVENOUS; SUBCUTANEOUS at 06:29

## 2017-05-01 RX ADMIN — DIAZEPAM 5 MG: 5 INJECTION, SOLUTION INTRAMUSCULAR; INTRAVENOUS at 08:46

## 2017-05-01 NOTE — ED NOTES
Patient returned from triage states pain is now a 5/10, patient is resting in bed, bed in low position, call bell in reach, placed on the monitor x2,  at bedside.

## 2017-05-01 NOTE — ED NOTES
Bedside and Verbal shift change report given to Joseph Tabares (oncoming nurse) by Adelene Simmonds, RN (offgoing nurse). Report included the following information SBAR, ED Summary, Intake/Output, MAR and Recent Results.

## 2017-05-01 NOTE — ED NOTES
7:06 AM  Change of shift. Care of patient taken over from Ric Forte DO; H&P reviewed, handoff complete. Awaiting labs/imaging/consultant. 7:37 AM  Patient returned from CT. CONSULT NOTE:  7:56 AM Sejal Rodrigues MD spoke with Dr. Radha Almanza, Consult for Surgery. Discussed available diagnostic tests and clinical findings. Dr. Radha Almanza agrees with CT scan. If normal, she recommends US of RUQ. From there, she will decide if the patient will need admission for pain control. 8:42 AM  US normal.  Patient is feeling a little better, but continues to complain of pain. She was offered admission, but would prefer to be sent home. 8:58 AM  Patient was seen and evaluated by Dr. Irina Martell and his ACP. They suspect that pain is more likely due to spasms. Patient received Toradol and Valium. Pt would like to go home. She'll follow up with surgery. I gave her a small script for Dilaudid. She will call surgery today.

## 2017-05-01 NOTE — DISCHARGE INSTRUCTIONS
We hope that we have addressed all of your medical concerns. The examination and treatment you received in the Emergency Department were for an emergent problem and were not intended as complete care. It is important that you follow up with your healthcare provider(s) for ongoing care. If your symptoms worsen or do not improve as expected, and you are unable to reach your usual health care provider(s), you should return to the Emergency Department. Today's healthcare is undergoing tremendous change, and patient satisfaction surveys are one of the many tools to assess the quality of medical care. You may receive a survey from the [employer] regarding your experience in the Emergency Department. I hope that your experience has been completely positive, particularly the medical care that I provided. As such, please participate in the survey; anything less than excellent does not meet my expectations or intentions. Formerly [facility], [facility] and [address] [place] [address] participate in nationally recognized quality of care measures. If your blood pressure is greater than 120/80, as reported below, we urge that you seek medical care to address the potential of high blood pressure, commonly known as hypertension. Hypertension can be hereditary or can be caused by certain medical conditions, pain, stress, or \"white coat syndrome. \"       Please make an appointment with your health care provider(s) for follow up of your Emergency Department visit. VITALS:   Patient Vitals for the past 8 hrs:   Temp Pulse Resp BP SpO2   05/01/17 0800 - - - 125/83 90 %   05/01/17 0730 - - - 121/78 90 %   05/01/17 0504 97.3 °F (36.3 °C) 77 16 (!) 149/96 99 %          Thank you for allowing us to provide you with medical care today. We realize that you have many choices for your emergency care needs. Please choose us in the future for any continued health care needs.       Regards, Sejal Rodrigues MD    [address] [facility].   Office: [phone]            Recent Results (from the past 24 hour(s))   CBC WITH AUTOMATED DIFF    Collection Time: 05/01/17  5:19 AM   Result Value Ref Range    WBC 6.0 3.6 - 11.0 K/uL    RBC 4.81 3.80 - 5.20 M/uL    HGB 12.4 11.5 - 16.0 g/dL    HCT 40.0 35.0 - 47.0 %    MCV 83.2 80.0 - 99.0 FL    MCH 25.8 (L) 26.0 - 34.0 PG    MCHC 31.0 30.0 - 36.5 g/dL    RDW 14.8 (H) 11.5 - 14.5 %    PLATELET 343 079 - 551 K/uL    NEUTROPHILS 65 32 - 75 %    LYMPHOCYTES 21 12 - 49 %    MONOCYTES 10 5 - 13 %    EOSINOPHILS 4 0 - 7 %    BASOPHILS 0 0 - 1 %    ABS. NEUTROPHILS 3.9 1.8 - 8.0 K/UL    ABS. LYMPHOCYTES 1.3 0.8 - 3.5 K/UL    ABS. MONOCYTES 0.6 0.0 - 1.0 K/UL    ABS. EOSINOPHILS 0.2 0.0 - 0.4 K/UL    ABS. BASOPHILS 0.0 0.0 - 0.1 K/UL   METABOLIC PANEL, COMPREHENSIVE    Collection Time: 05/01/17  5:19 AM   Result Value Ref Range    Sodium 138 136 - 145 mmol/L    Potassium 3.8 3.5 - 5.1 mmol/L    Chloride 102 97 - 108 mmol/L    CO2 25 21 - 32 mmol/L    Anion gap 11 5 - 15 mmol/L    Glucose 92 65 - 100 mg/dL    BUN 8 6 - 20 MG/DL    Creatinine 0.93 0.55 - 1.02 MG/DL    BUN/Creatinine ratio 9 (L) 12 - 20      GFR est AA >60 >60 ml/min/1.73m2    GFR est non-AA >60 >60 ml/min/1.73m2    Calcium 8.7 8.5 - 10.1 MG/DL    Bilirubin, total 0.4 0.2 - 1.0 MG/DL    ALT (SGPT) 61 12 - 78 U/L    AST (SGOT) 30 15 - 37 U/L    Alk.  phosphatase 101 45 - 117 U/L    Protein, total 7.9 6.4 - 8.2 g/dL    Albumin 3.7 3.5 - 5.0 g/dL    Globulin 4.2 (H) 2.0 - 4.0 g/dL    A-G Ratio 0.9 (L) 1.1 - 2.2     LIPASE    Collection Time: 05/01/17  5:19 AM   Result Value Ref Range    Lipase 212 73 - 393 U/L   HCG URINE, QL. - POC    Collection Time: 05/01/17  6:42 AM   Result Value Ref Range    Pregnancy test,urine (POC) NEGATIVE  NEG         Cta Chest W Or W Wo Cont    Result Date: 5/1/2017  EXAM:  CTA CHEST W OR W WO CONT, CT ABD PELV W CONT INDICATION:  right sided pain, shortness of breath, recent cholecystectomy. Severe right upper quadrant pain not responding to conservative treatment. Pain with breathing. COMPARISON:  Abdominal ultrasound 3/30/17 PROCEDURE: During rapid intravenous bolus infusion 97 ml Isovue 370 thin spiral axial images were obtained through the chest. Coronal MIP reconstructions of the pulmonary arteries as well as sagittal and coronal standard reconstruction were obtained for evaluation of the pulmonary arteries. Standard axial soft issue and lung windows. CT dose reduction was achieved through use of a standardized protocol tailored for this examination and automatic exposure control for dose modulation. FINDINGS: There are no intraluminal filling defects in the pulmonary arteries that would suggest a pulmonary embolus. Minimal atelectasis. No confluent infiltrate or consolidation. No evidence of hilar, mediastinal or axillary adenopathy. No pleural fluid. No pneumothorax. IMPRESSION: 1. No evidence of pulmonary emboli. 2. No acute cardiopulmonary abnormality. EXAM:  CT ABDOMEN PELVIS WITH CONTRAST INDICATION:  right sided pain, sob, persistent right upper quadrant pain after surgery not resolved with medication and conservative treatment. COMPARISON: Preop ultrasound. CONTRAST:  100 mL of Isovue-370. TECHNIQUE:  Multislice helical CT was performed from the diaphragm to the symphysis pubis during uneventful rapid bolus intravenous contrast administration. Oral contrast was not administered. Contiguous 5 mm axial images were reconstructed and lung and soft tissue windows were generated. Coronal and sagittal reformations were generated. CT dose reduction was achieved through use of a standardized protocol tailored for this examination and automatic exposure control for dose modulation. FINDINGS: There are no focal abnormalities within the liver, spleen, pancreas, adrenal glands or kidneys.  There is mild soft tissue stranding in the region of the gallbladder fossa and right upper quadrant adjacent to the hepatic flexure. Small sliver of fluid over the right anterior liver, and otherwise no other significant fluid collection in this area demonstrated. No free intraperitoneal air. The intra and extrahepatic bile ducts are not dilated. The aorta tapers without aneurysm. There is no retroperitoneal adenopathy or mass. The bowel is normal. The appendix is only demonstrated. No significant inflammation around the cecum. The uterus is within normal limits for size. There is some fluid around the left ovary/adnexa. Probable left ovarian cyst. No free fluid in the cul-de-sac. IMPRESSION: 1. Mild soft tissue stranding in the chyna hepatis and gallbladder fossa without focal fluid collection at this location. Minimal perihepatic fluid on the right anteriorly. 2. Minimal fluid adjacent to the left greater than right adnexa is nonspecific. No free fluid in the cul-de-sac or other ascites. Ct Abd Pelv W Cont    Result Date: 5/1/2017  EXAM:  CTA CHEST W OR W WO CONT, CT ABD PELV W CONT INDICATION:  right sided pain, shortness of breath, recent cholecystectomy. Severe right upper quadrant pain not responding to conservative treatment. Pain with breathing. COMPARISON:  Abdominal ultrasound 3/30/17 PROCEDURE: During rapid intravenous bolus infusion 97 ml Isovue 370 thin spiral axial images were obtained through the chest. Coronal MIP reconstructions of the pulmonary arteries as well as sagittal and coronal standard reconstruction were obtained for evaluation of the pulmonary arteries. Standard axial soft issue and lung windows. CT dose reduction was achieved through use of a standardized protocol tailored for this examination and automatic exposure control for dose modulation. FINDINGS: There are no intraluminal filling defects in the pulmonary arteries that would suggest a pulmonary embolus. Minimal atelectasis. No confluent infiltrate or consolidation.  No evidence of hilar, mediastinal or axillary adenopathy. No pleural fluid. No pneumothorax. IMPRESSION: 1. No evidence of pulmonary emboli. 2. No acute cardiopulmonary abnormality. EXAM:  CT ABDOMEN PELVIS WITH CONTRAST INDICATION:  right sided pain, sob, persistent right upper quadrant pain after surgery not resolved with medication and conservative treatment. COMPARISON: Preop ultrasound. CONTRAST:  100 mL of Isovue-370. TECHNIQUE:  Multislice helical CT was performed from the diaphragm to the symphysis pubis during uneventful rapid bolus intravenous contrast administration. Oral contrast was not administered. Contiguous 5 mm axial images were reconstructed and lung and soft tissue windows were generated. Coronal and sagittal reformations were generated. CT dose reduction was achieved through use of a standardized protocol tailored for this examination and automatic exposure control for dose modulation. FINDINGS: There are no focal abnormalities within the liver, spleen, pancreas, adrenal glands or kidneys. There is mild soft tissue stranding in the region of the gallbladder fossa and right upper quadrant adjacent to the hepatic flexure. Small sliver of fluid over the right anterior liver, and otherwise no other significant fluid collection in this area demonstrated. No free intraperitoneal air. The intra and extrahepatic bile ducts are not dilated. The aorta tapers without aneurysm. There is no retroperitoneal adenopathy or mass. The bowel is normal. The appendix is only demonstrated. No significant inflammation around the cecum. The uterus is within normal limits for size. There is some fluid around the left ovary/adnexa. Probable left ovarian cyst. No free fluid in the cul-de-sac. IMPRESSION: 1. Mild soft tissue stranding in the chyna hepatis and gallbladder fossa without focal fluid collection at this location. Minimal perihepatic fluid on the right anteriorly.  2. Minimal fluid adjacent to the left greater than right adnexa is nonspecific. No free fluid in the cul-de-sac or other ascites. [address] [place]    Result Date: 5/1/2017  CLINICAL HISTORY: Right upper quadrant pain, question of biliary pathology INDICATION: Abnormal LFTs FINDINGS: Limited right upper quadrant images of the abdomen were obtained using a curved array transducer. The right kidney measures 10.3 cm is normal in echogenicity with normal corticomedullary differentiation. There is no hydronephrosis. The gallbladder is surgically absent. The common bile duct measures 0.6cm. Liver is mildly increased in echogenicity. Portal venous flow within normal limits. The visualized portions of the pancreas are unremarkable. IMPRESSION: Status post cholecystectomy. Otherwise unremarkable abdominal ultrasound. Abdominal Pain: Care Instructions  Your Care Instructions    Abdominal pain has many possible causes. Some aren't serious and get better on their own in a few days. Others need more testing and treatment. If your pain continues or gets worse, you need to be rechecked and may need more tests to find out what is wrong. You may need surgery to correct the problem. Don't ignore new symptoms, such as fever, nausea and vomiting, urination problems, pain that gets worse, and dizziness. These may be signs of a more serious problem. Your doctor may have recommended a follow-up visit in the next 8 to 12 hours. If you are not getting better, you may need more tests or treatment. The doctor has checked you carefully, but problems can develop later. If you notice any problems or new symptoms, get medical treatment right away. Follow-up care is a key part of your treatment and safety. Be sure to make and go to all appointments, and call your doctor if you are having problems. It's also a good idea to know your test results and keep a list of the medicines you take. How can you care for yourself at home? · Rest until you feel better.   · To prevent dehydration, drink plenty of fluids, enough so that your urine is light yellow or clear like water. Choose water and other caffeine-free clear liquids until you feel better. If you have kidney, heart, or liver disease and have to limit fluids, talk with your doctor before you increase the amount of fluids you drink. · If your stomach is upset, eat mild foods, such as rice, dry toast or crackers, bananas, and applesauce. Try eating several small meals instead of two or three large ones. · Wait until 48 hours after all symptoms have gone away before you have spicy foods, alcohol, and drinks that contain caffeine. · Do not eat foods that are high in fat. · Avoid anti-inflammatory medicines such as aspirin, ibuprofen (Advil, Motrin), and naproxen (Aleve). These can cause stomach upset. Talk to your doctor if you take daily aspirin for another health problem. When should you call for help? Call 911 anytime you think you may need emergency care. For example, call if:  · You passed out (lost consciousness). · You pass maroon or very bloody stools. · You vomit blood or what looks like coffee grounds. · You have new, severe belly pain. Call your doctor now or seek immediate medical care if:  · Your pain gets worse, especially if it becomes focused in one area of your belly. · You have a new or higher fever. · Your stools are black and look like tar, or they have streaks of blood. · You have unexpected vaginal bleeding. · You have symptoms of a urinary tract infection. These may include:  ¨ Pain when you urinate. ¨ Urinating more often than usual.  ¨ Blood in your urine. · You are dizzy or lightheaded, or you feel like you may faint. Watch closely for changes in your health, and be sure to contact your doctor if:  · You are not getting better after 1 day (24 hours). Where can you learn more? Go to http://maury-manny.info/.   Enter P709 in the search box to learn more about \"Abdominal Pain: Care Instructions. \"  Current as of: May 27, 2016  Content Version: 11.2  © 1417-0303 [employer], [facility]. Care instructions adapted under license by [employer] (which disclaims liability or warranty for this information). If you have questions about a medical condition or this instruction, always ask your healthcare professional. [place] [address] any warranty or liability for your use of this information.

## 2017-05-01 NOTE — ED NOTES
Bedside shift change report given to vanessa otero (oncoming nurse) by Kj Danielle (offgoing nurse). Report included the following information SBAR. Patient remains at CT scan.

## 2017-05-01 NOTE — ED TRIAGE NOTES
Pt states she had her gallbladder removed on 4/27/17 and had issues with pain control.   Per patient surgeon has \"doubled\" her pain medication but pt is still struggling with pain control

## 2017-05-01 NOTE — ED NOTES
Patient's oxygen sats dropped to 88% while sleeping after pain medication, patient easily arrousable, placed on 2L NC.

## 2017-05-01 NOTE — ED PROVIDER NOTES
HPI Comments: Pt states she had her gallbladder removed on 17 and has had issues with pain control. Per patient, surgeon has \"doubled\" her pain medication but pt is still struggling with pain control. Patient is a 46 y.o. female presenting with bleeding. The history is provided by the patient and the spouse. No  was used. Post-Op Problem   This is a new problem. The current episode started 3 to 5 hours ago. The problem occurs constantly. The problem has been rapidly worsening. Associated symptoms include abdominal pain and shortness of breath (with pain). Pertinent negatives include no chest pain and no headaches. Treatments tried: percocet. The treatment provided mild relief. Past Medical History:   Diagnosis Date    Abnormal Pap smear     colposcopy, wnl since    Asthma     stress induced(last in january)    Group B Streptococcus carrier, +RV culture, currently pregnant 16    Infertility     IUI to get pregnant    Polycystic ovarian syndrome     Thyroid disease     Trauma     broken R ankle, R foot, L arm       Past Surgical History:   Procedure Laterality Date     DELIVERY ONLY  12    CKC, AKA COLD KNIFE CONE  2016    HX COLPOSCOPY  3/17/16    HX GYN      Csection X2    HX HEENT      DENTAL         Family History:   Problem Relation Age of Onset    Thyroid Disease Mother      also in maternal aunt    Cancer Maternal Grandmother     Cancer Paternal Grandmother      brain tumor    Anesth Problems Neg Hx        Social History     Social History    Marital status:      Spouse name: N/A    Number of children: N/A    Years of education: N/A     Occupational History    Not on file.      Social History Main Topics    Smoking status: Never Smoker    Smokeless tobacco: Never Used    Alcohol use No    Drug use: No    Sexual activity: Not Currently     Partners: Male     Birth control/ protection: None     Other Topics Concern    Not on file     Social History Narrative     ALLERGIES: Hydrocodone; Omeprazole; Quibron [theophylline-guaifenesin]; and Tape [adhesive]    Review of Systems   Constitutional: Negative for appetite change, chills, fever and unexpected weight change. HENT: Negative for ear pain, hearing loss, rhinorrhea and trouble swallowing. Eyes: Negative for pain and visual disturbance. Respiratory: Positive for shortness of breath (with pain). Negative for cough and chest tightness. Cardiovascular: Negative for chest pain and palpitations. Gastrointestinal: Positive for abdominal pain. Negative for abdominal distention, blood in stool and vomiting. Genitourinary: Negative for dysuria, hematuria and urgency. Musculoskeletal: Negative for back pain and myalgias. Skin: Negative for rash. Neurological: Negative for dizziness, syncope, weakness, numbness and headaches. Psychiatric/Behavioral: Negative for confusion and suicidal ideas. All other systems reviewed and are negative. Vitals:    05/01/17 0504   BP: (!) 149/96   Pulse: 77   Resp: 16   Temp: 97.3 °F (36.3 °C)   SpO2: 99%   Weight: 109.7 kg (241 lb 13.5 oz)   Height: 5' 7\" (1.702 m)            Physical Exam   Constitutional: She is oriented to person, place, and time. She appears well-developed and well-nourished. No distress. HENT:   Head: Normocephalic and atraumatic. Right Ear: External ear normal.   Left Ear: External ear normal.   Nose: Nose normal.   Mouth/Throat: Oropharynx is clear and moist. No oropharyngeal exudate. Eyes: Conjunctivae and EOM are normal. Pupils are equal, round, and reactive to light. Right eye exhibits no discharge. Left eye exhibits no discharge. No scleral icterus. Neck: Normal range of motion. Neck supple. No JVD present. No tracheal deviation present. Cardiovascular: Normal rate, regular rhythm, normal heart sounds and intact distal pulses. Exam reveals no gallop and no friction rub.     No murmur heard.  Pulmonary/Chest: Effort normal and breath sounds normal. No stridor. No respiratory distress. She has no decreased breath sounds. She has no wheezes. She has no rhonchi. She has no rales. She exhibits no tenderness. Abdominal: Soft. She exhibits no distension. Bowel sounds are decreased. There is tenderness in the right upper quadrant and epigastric area. There is no rebound and no guarding. Surgical incisions are clean, dry and intact. No signs of infection. Musculoskeletal: Normal range of motion. She exhibits no edema or tenderness. Neurological: She is alert and oriented to person, place, and time. She has normal strength and normal reflexes. No cranial nerve deficit or sensory deficit. She exhibits normal muscle tone. Coordination normal. GCS eye subscore is 4. GCS verbal subscore is 5. GCS motor subscore is 6. Skin: Skin is warm and dry. No rash noted. She is not diaphoretic. No erythema. No pallor. Psychiatric: She has a normal mood and affect. Her behavior is normal. Judgment and thought content normal.   Nursing note and vitals reviewed. MDM  Number of Diagnoses or Management Options  Post-op pain:   RUQ abdominal pain:      Amount and/or Complexity of Data Reviewed  Clinical lab tests: ordered and reviewed  Tests in the radiology section of CPT®: ordered    Risk of Complications, Morbidity, and/or Mortality  Presenting problems: moderate  Diagnostic procedures: moderate  Management options: moderate    Patient Progress  Patient progress: stable    ED Course       Procedures  Chief Complaint   Patient presents with    Post-Op Problem       7:10 AM  The patients presenting problems have been discussed, and they are in agreement with the care plan formulated and outlined with them. I have encouraged them to ask questions as they arise throughout their visit.     MEDICATIONS GIVEN:  Medications   sodium chloride (NS) flush 5-10 mL (not administered)   sodium chloride (NS) flush 5-10 mL (not administered)   iopamidol (ISOVUE-370) 76 % injection 100 mL (not administered)   HYDROmorphone (PF) (DILAUDID) injection 1 mg (1 mg IntraVENous Given 5/1/17 0529)   ondansetron (ZOFRAN) injection 4 mg (4 mg IntraVENous Given 5/1/17 0528)   sodium chloride 0.9 % bolus infusion 1,000 mL (1,000 mL IntraVENous New Bag 5/1/17 0532)   HYDROmorphone (PF) (DILAUDID) injection 1 mg (1 mg IntraVENous Given 5/1/17 0629)       LABS REVIEWED:  Recent Results (from the past 24 hour(s))   CBC WITH AUTOMATED DIFF    Collection Time: 05/01/17  5:19 AM   Result Value Ref Range    WBC 6.0 3.6 - 11.0 K/uL    RBC 4.81 3.80 - 5.20 M/uL    HGB 12.4 11.5 - 16.0 g/dL    HCT 40.0 35.0 - 47.0 %    MCV 83.2 80.0 - 99.0 FL    MCH 25.8 (L) 26.0 - 34.0 PG    MCHC 31.0 30.0 - 36.5 g/dL    RDW 14.8 (H) 11.5 - 14.5 %    PLATELET 998 932 - 237 K/uL    NEUTROPHILS 65 32 - 75 %    LYMPHOCYTES 21 12 - 49 %    MONOCYTES 10 5 - 13 %    EOSINOPHILS 4 0 - 7 %    BASOPHILS 0 0 - 1 %    ABS. NEUTROPHILS 3.9 1.8 - 8.0 K/UL    ABS. LYMPHOCYTES 1.3 0.8 - 3.5 K/UL    ABS. MONOCYTES 0.6 0.0 - 1.0 K/UL    ABS. EOSINOPHILS 0.2 0.0 - 0.4 K/UL    ABS. BASOPHILS 0.0 0.0 - 0.1 K/UL   METABOLIC PANEL, COMPREHENSIVE    Collection Time: 05/01/17  5:19 AM   Result Value Ref Range    Sodium 138 136 - 145 mmol/L    Potassium 3.8 3.5 - 5.1 mmol/L    Chloride 102 97 - 108 mmol/L    CO2 25 21 - 32 mmol/L    Anion gap 11 5 - 15 mmol/L    Glucose 92 65 - 100 mg/dL    BUN 8 6 - 20 MG/DL    Creatinine 0.93 0.55 - 1.02 MG/DL    BUN/Creatinine ratio 9 (L) 12 - 20      GFR est AA >60 >60 ml/min/1.73m2    GFR est non-AA >60 >60 ml/min/1.73m2    Calcium 8.7 8.5 - 10.1 MG/DL    Bilirubin, total 0.4 0.2 - 1.0 MG/DL    ALT (SGPT) 61 12 - 78 U/L    AST (SGOT) 30 15 - 37 U/L    Alk.  phosphatase 101 45 - 117 U/L    Protein, total 7.9 6.4 - 8.2 g/dL    Albumin 3.7 3.5 - 5.0 g/dL    Globulin 4.2 (H) 2.0 - 4.0 g/dL    A-G Ratio 0.9 (L) 1.1 - 2.2     LIPASE    Collection Time: 05/01/17 5:19 AM   Result Value Ref Range    Lipase 212 73 - 393 U/L   HCG URINE, QL. - POC    Collection Time: 05/01/17  6:42 AM   Result Value Ref Range    Pregnancy test,urine (POC) NEGATIVE  NEG         VITAL SIGNS:  Patient Vitals for the past 12 hrs:   Temp Pulse Resp BP SpO2   05/01/17 0504 97.3 °F (36.3 °C) 77 16 (!) 149/96 99 %       RADIOLOGY RESULTS:  The following have been ordered and reviewed:  CTA CHEST W OR W WO CONT    (Results Pending)   CT ABD PELV W CONT    (Results Pending)     PROGRESS NOTES:  Final disposition will be made by on coming physician. DIAGNOSIS:    1. RUQ abdominal pain    2. Post-op pain        PLAN:  7:10 AM  Change of shift. Care of patient signed over to Dr. Albania Addison. Handoff complete. ED COURSE: The patients hospital course has been uncomplicated.

## 2017-05-01 NOTE — CONSULTS
Surgery Consult    Subjective:      Schuyler Kanner is a 46 y.o. female who presented to the ED today with c/o RUQ abdominal pain. She is 4 days s/p lap dennis by Dr. Delmy Chaudhry at [facility]. Patient had uncontrolled pain over the weekend. She called the on-call MD and was instructed to increase Percocet to 2 PO per dose. She states pain was manageable at first but yesterday became worse. She describes RUQ pain that comes in goes in a cramping fashion. Pain radiates into her right back and flank. She denies N/V or fever. In ED labs and imaging are unremarkable.      Past Medical History:   Diagnosis Date    Abnormal Pap smear     colposcopy, wnl since    Asthma     stress induced(last in january)    Group B Streptococcus carrier, +RV culture, currently pregnant 16    Infertility     IUI to get pregnant    Polycystic ovarian syndrome     Thyroid disease     Trauma     broken R ankle, R foot, L arm     Past Surgical History:   Procedure Laterality Date     DELIVERY ONLY  12    CKC, AKA COLD KNIFE CONE      HX COLPOSCOPY  3/17/16    HX GYN      Csection X2    HX HEENT      DENTAL      Family History   Problem Relation Age of Onset    Thyroid Disease Mother      also in maternal aunt    Cancer Maternal Grandmother     Cancer Paternal Grandmother      brain tumor    Anesth Problems Neg Hx      Social History     Social History    Marital status:      Spouse name: N/A    Number of children: N/A    Years of education: N/A     Social History Main Topics    Smoking status: Never Smoker    Smokeless tobacco: Never Used    Alcohol use No    Drug use: No    Sexual activity: Not Currently     Partners: Male     Birth control/ protection: None     Other Topics Concern    None     Social History Narrative      Current Facility-Administered Medications   Medication Dose Route Frequency    sodium chloride (NS) flush 5-10 mL  5-10 mL IntraVENous Q8H    sodium chloride (NS) flush 5-10 mL  5-10 mL IntraVENous PRN     Current Outpatient Prescriptions   Medication Sig    HYDROmorphone (DILAUDID) 2 mg tablet Take 0.5 Tabs by mouth every six (6) hours as needed for Pain. Max Daily Amount: 4 mg.  ondansetron (ZOFRAN ODT) 4 mg disintegrating tablet Take 1 Tab by mouth every eight (8) hours as needed for Nausea.  oxyCODONE-acetaminophen (PERCOCET) 5-325 mg per tablet Take 1 Tab by mouth every four (4) hours as needed for Pain. Max Daily Amount: 6 Tabs.  metFORMIN (GLUCOPHAGE) 500 mg tablet Take 1 Tab by mouth two (2) times daily (with meals).  levothyroxine (SYNTHROID) 112 mcg tablet Take 1 Tab by mouth Daily (before breakfast). Lower dose    PNV no.24-iron-folic acid-dha (PRENATAL DHA+COMPLETE PRENATAL) -300 mg-mcg-mg cmpk Take  by mouth. Allergies   Allergen Reactions    Hydrocodone Shortness of Breath and Vertigo    Omeprazole Hives    Quibron [Theophylline-Guaifenesin] Other (comments)     Hallucinations and violent.      Tape [Adhesive] Rash       Review of Systems:REVIEW OF SYSTEMS:     []     Unable to obtain  ROS due to  []    mental status change  []    sedated   []    intubated   []    Total of 12 systems reviewed as follows:    Constitutional: neg for fevers, chills  Eyes: negative for blurry vision  Ears, nose, mouth, throat, and face: negative for sore throat  Respiratory: negative for SOB  Cardiovascular: negative for CP  Gastrointestinal: positive for abdominal pain  Genitourinary: negative for dysuria  Integument/breast: neg for skin rash  Hematologic/lymphatic: neg for bruising  Musculoskeletal: negative for muscle aches  Neurological: no dizziness or h/a    Objective:      Patient Vitals for the past 8 hrs:   BP Temp Pulse Resp SpO2 Height Weight   05/01/17 0930 136/78 - - - 93 % - -   05/01/17 0915 - - - - 96 % - -   05/01/17 0905 - - - - (!) 88 % - -   05/01/17 0901 130/88 - - - 91 % - -   05/01/17 0851 - - - - 98 % - -   05/01/17 0830 (!) 136/92 - - - 95 % - -   17 0800 125/83 - - - 90 % - -   17 0730 121/78 - - - 90 % - -   17 0504 (!) 149/96 97.3 °F (36.3 °C) 77 16 99 % 5' 7\" (1.702 m) 109.7 kg (241 lb 13.5 oz)       Temp (24hrs), Av.3 °F (36.3 °C), Min:97.3 °F (36.3 °C), Max:97.3 °F (36.3 °C)      Physical Exam:  General:  Alert, cooperative, no distress, mild uncomfortable    Eyes:  Conjunctivae/corneas clear. Nose: Nares normal. Septum midline. Mouth/Throat: Lips, mucosa, and tongue normal.    Neck: Supple, symmetrical, trachea midline   Lungs:   Clear to auscultation bilaterally. Heart:  Regular rate and rhythm   Abdomen:   Soft, ATTP, no distention, incisions healing well. Extremities: Extremities normal, atraumatic, no cyanosis or edema. Skin: Skin color, texture, turgor normal. No rashes or lesions   Neuro: Alert, oriented, speech clear      Labs: Recent Labs      17   0519   WBC  6.0   HGB  12.4   HCT  40.0   PLT  221     Recent Labs      17   0519   NA  138   K  3.8   CL  102   CO2  25   GLU  92   BUN  8   CREA  0.93   CA  8.7   ALB  3.7   TBILI  0.4   SGOT  30   ALT  61     No results for input(s): INR in the last 72 hours. No lab exists for component: INREXT    CT abd/pelvis  IMPRESSION:   1. Mild soft tissue stranding in the chyna hepatis and gallbladder fossa without  focal fluid collection at this location. Minimal perihepatic fluid on the right  anteriorly. 2. Minimal fluid adjacent to the left greater than right adnexa is nonspecific. No free fluid in the cul-de-sac or other ascites. US abd  IMPRESSION:   Status post cholecystectomy.        Otherwise unremarkable abdominal ultrasound. Assessment and Plan:     Post-op abdominal pain    Patient is 4 days s/p lap dennis with report of increasing RUQ and flank pain. There is no evidence of post-operative complication on exam or ED work-up. Pain waxes and wanes and is described as cramping, which is suggestive of abdominal muscle spasms.  Will give IV Toradol and Valium in ED to see if this alleviates her symptoms. If symptoms can be controlled, she may be discharged on Valium and Motrin and should follow up with Dr. Tonja Krishnamurthy in the office this week. Discussed with Dr. Zaira Bah. Thank you for the consultation. Patient was evaluated in conjunction with Dr. Sarah Richard.        Signed By: HARRISON Leung     May 1, 2017

## 2017-05-01 NOTE — ED NOTES
Dr. Mary Zheng explained discharge instructions to patient and gave discharge papers. Pain medications administered, will continue to monitor patient while she waits for ride.

## 2017-06-30 ENCOUNTER — TELEPHONE (OUTPATIENT)
Dept: FAMILY MEDICINE CLINIC | Age: 42
End: 2017-06-30

## 2017-06-30 RX ORDER — LEVOTHYROXINE SODIUM 112 UG/1
112 TABLET ORAL
Qty: 14 TAB | Refills: 0 | Status: SHIPPED | OUTPATIENT
Start: 2017-06-30 | End: 2017-07-25 | Stop reason: SDUPTHER

## 2017-06-30 NOTE — TELEPHONE ENCOUNTER
Patient would like to know if you would be willing to give her 2 weeks worth of Thyroid medication to last until her appointment with you ( next available, 7/25/17)   Please call patient when addressed.

## 2017-07-25 ENCOUNTER — OFFICE VISIT (OUTPATIENT)
Dept: FAMILY MEDICINE CLINIC | Age: 42
End: 2017-07-25

## 2017-07-25 VITALS
HEIGHT: 67 IN | SYSTOLIC BLOOD PRESSURE: 133 MMHG | WEIGHT: 239 LBS | TEMPERATURE: 98.1 F | DIASTOLIC BLOOD PRESSURE: 87 MMHG | OXYGEN SATURATION: 100 % | RESPIRATION RATE: 18 BRPM | BODY MASS INDEX: 37.51 KG/M2 | HEART RATE: 89 BPM

## 2017-07-25 DIAGNOSIS — E03.9 ACQUIRED HYPOTHYROIDISM: ICD-10-CM

## 2017-07-25 DIAGNOSIS — F32.9 REACTIVE DEPRESSION: ICD-10-CM

## 2017-07-25 DIAGNOSIS — N85.00 ENDOMETRIAL HYPERPLASIA: ICD-10-CM

## 2017-07-25 DIAGNOSIS — E28.2 PCOS (POLYCYSTIC OVARIAN SYNDROME): ICD-10-CM

## 2017-07-25 DIAGNOSIS — Z30.019 ENCOUNTER FOR INITIAL PRESCRIPTION OF CONTRACEPTIVES, UNSPECIFIED CONTRACEPTIVE: ICD-10-CM

## 2017-07-25 DIAGNOSIS — F41.9 ANXIETY: Primary | ICD-10-CM

## 2017-07-25 LAB
HCG URINE, QL. (POC): NEGATIVE
VALID INTERNAL CONTROL?: YES

## 2017-07-25 RX ORDER — SERTRALINE HYDROCHLORIDE 50 MG/1
TABLET, FILM COATED ORAL
Qty: 60 TAB | Refills: 2 | Status: SHIPPED | OUTPATIENT
Start: 2017-07-25 | End: 2017-10-26 | Stop reason: SDUPTHER

## 2017-07-25 RX ORDER — LEVOTHYROXINE SODIUM 112 UG/1
112 TABLET ORAL
Qty: 30 TAB | Refills: 11 | Status: SHIPPED | OUTPATIENT
Start: 2017-07-25 | End: 2018-09-24 | Stop reason: SDUPTHER

## 2017-07-25 NOTE — PROGRESS NOTES
I saw and evaluated the patient, performing the key elements of the service. I discussed the findings, assessment and plan with the resident and agree with the resident's findings and plan as documented in the resident's note. Very difficult psycosocial situation  Seeking     Having heavy, painful menses   Needs to followup with Dr. Deedee Weldon fo repeat EMBx   Hold off OCP until this is resolved    Greater than 50% of this 30 minute visit was counseling about anxiety and depression.

## 2017-07-25 NOTE — PROGRESS NOTES
HPI       Cherelle Booker is a 46 y.o. female who presents for period regulation and depression    Period regulation  - hx of PCOS (not on any medications)  - Period every 2-3 weeks, off since she had her daughter 18 months ago, but was breastfeeding until 8 months ago. First period lasted November - January, since then they have been irregular  - Last period lasted 1 week, was extremely heavy (bleeding through super plus in 2 hours, getting up in the night to change tampons). - Periods also extremely painful, does not take any medications    - Has not been on birth control in over 11 years (was trying to get pregnant), OCPs, no problems with OCPs, no hx of blood clots. Would like to be restarted on OCPs, not sure which one it was. - Still needs to f/u with Acosta Cava, then birth control after if still ongoing    Hypothyroidism  - On levothyroxine 112 mcg daily needs refill  - last TSH 15.54 on 11/17/16    Depression  - Pt under a lot of stress,  left her last month and hasn't come back. Wont' return calls or texts, but is going to work/paycheck continues to come in and then a portion of it goes to pt's account. Did receive a letter stating he didn't want to see her anymore.  Pt was already having a hard time with mood before he walked out.   - Has been on antidepressants once before (~2006), was on for < 1 month due to situational hardship which resolved, pt did not have to take them any longer  - No hospitalizations for psych reasons     - Says mood is \"alright\" but has frequent days where she feels down and depressed, has kids which helps   - Sleep - \"not good\" only sleeping 4-5 hours   - Interest - feels like she doesn't have time or energy for activities she enjoys, feels overwhelmed  - Guilt - yes, with  leaving, wondering if she is to blame  - Energy - no changes in energy  - Concentration - no trouble concentrating, no change from normal  - Appetite - hasn't been eating as much  - Psychomotor agitation - denies  - No SI/HI  - No hallucinations     Has a good support system with her parents, does not want counselor at this time. PMHx:  Past Medical History:   Diagnosis Date    Abnormal Pap smear     colposcopy, wnl since    Asthma     stress induced(last in january)    Group B Streptococcus carrier, +RV culture, currently pregnant 1/20/16    Infertility     IUI to get pregnant    Polycystic ovarian syndrome     Thyroid disease     Trauma     broken R ankle, R foot, L arm     Meds:   Current Outpatient Prescriptions   Medication Sig Dispense Refill    levothyroxine (SYNTHROID) 112 mcg tablet Take 1 Tab by mouth Daily (before breakfast). Lower dose 14 Tab 0    HYDROmorphone (DILAUDID) 2 mg tablet Take 0.5 Tabs by mouth every six (6) hours as needed for Pain. Max Daily Amount: 4 mg. 10 Tab 0    ondansetron (ZOFRAN ODT) 4 mg disintegrating tablet Take 1 Tab by mouth every eight (8) hours as needed for Nausea. 15 Tab 0    oxyCODONE-acetaminophen (PERCOCET) 5-325 mg per tablet Take 1 Tab by mouth every four (4) hours as needed for Pain. Max Daily Amount: 6 Tabs. 30 Tab 0    metFORMIN (GLUCOPHAGE) 500 mg tablet Take 1 Tab by mouth two (2) times daily (with meals). 60 Tab 3    PNV no.24-iron-folic acid-dha (PRENATAL DHA+COMPLETE PRENATAL) -300 mg-mcg-mg cmpk Take  by mouth. Allergies: Allergies   Allergen Reactions    Hydrocodone Shortness of Breath and Vertigo    Omeprazole Hives    Quibron [Theophylline-Guaifenesin] Other (comments)     Hallucinations and violent.      Tape [Adhesive] Rash       Smoker:  History   Smoking Status    Never Smoker   Smokeless Tobacco    Never Used     ETOH:   History   Alcohol Use No     FH:   Family History   Problem Relation Age of Onset    Thyroid Disease Mother      also in maternal aunt    Cancer Maternal Grandmother     Cancer Paternal Grandmother      brain tumor    Anesth Problems Neg Hx      ROS:  Review of Systems Constitutional: Negative for fatigue and fever. HENT: Negative. Negative for sinus pressure, sneezing and sore throat. Respiratory: Negative for cough and shortness of breath. Cardiovascular: Negative for chest pain and palpitations. Gastrointestinal: Negative for abdominal pain, constipation, diarrhea, nausea and vomiting. Endocrine: Negative for polyuria. Genitourinary: Negative for difficulty urinating, dysuria, urgency and vaginal discharge. Musculoskeletal: Negative for back pain. Neurological: Negative for dizziness, light-headedness and headaches. Physical Exam:  Visit Vitals    /87 (BP 1 Location: Right arm, BP Patient Position: Sitting)    Pulse 89    Temp 98.1 °F (36.7 °C) (Oral)    Resp 18    Ht 5' 7\" (1.702 m)    Wt 239 lb (108.4 kg)    LMP 07/10/2017    SpO2 100%    BMI 37.43 kg/m2       Wt Readings from Last 3 Encounters:   07/25/17 239 lb (108.4 kg)   05/01/17 241 lb 13.5 oz (109.7 kg)   04/17/17 241 lb 10 oz (109.6 kg)     BP Readings from Last 3 Encounters:   07/25/17 133/87   05/01/17 136/78   04/27/17 110/64      Physical Exam   Constitutional: She appears well-developed and well-nourished. Eyes: EOM are normal.   Neck: Normal range of motion. Neck supple. No thyromegaly present. Cardiovascular: Normal rate, regular rhythm and normal heart sounds. Pulmonary/Chest: Effort normal and breath sounds normal.   Abdominal: Soft. Bowel sounds are normal.   Psychiatric:   Tearful, depressed mood     Recent Results (from the past 12 hour(s))   AMB POC URINE PREGNANCY TEST, VISUAL COLOR COMPARISON    Collection Time: 07/25/17 11:42 AM   Result Value Ref Range    VALID INTERNAL CONTROL POC Yes     HCG urine, Ql. (POC) Negative Negative             Assessment     46 y.o. female with:    ICD-10-CM ICD-9-CM    1. Encounter for initial prescription of contraceptives, unspecified contraceptive Z30.019 V25.02 AMB POC URINE PREGNANCY TEST, VISUAL COLOR COMPARISON   2. Acquired hypothyroidism E03.9 244.9    3. PCOS (polycystic ovarian syndrome) E28.2 256.4    4. Reactive depression F32.9 300.4               Plan       Orders Placed This Encounter    AMB POC URINE PREGNANCY TEST, VISUAL COLOR COMPARISON     PCOS with Menorrhagia, polymorrhea, dysmenorrhea  - Not on any medications for PCOS  - Endometrial biopsy dmitri Ingram 3/2017 showing glandular crowding, recommended f/u between 3-6 months  - Still needs to f/u with Domenico Ingram  - If irregular cycles continue after seeing Dr. Domeinco Ingram, then may reconsider birth control    Hypothyroidism (15.54 on 11/17/16)  - On levothyroxine 112 mcg daily - refill provided  - TSH today    Depression  - Will start sertraline 50mg - 1 daily for first week, then BID after that  -  declines referral for counseling at this time    Patient discussed and seen with Dr. Christopher Shah    I have discussed the diagnosis with the patient and the intended plan as seen in the above orders. The patient has received an after-visit summary and questions were answered concerning future plans. I have discussed medication side effects and warnings with the patient as well.     Jose David Rosenberg MD  Family Medicine Resident

## 2017-07-25 NOTE — PROGRESS NOTES
Chief Complaint   Patient presents with    Depression    Depo     regulate period     1. Have you been to the ER, urgent care clinic since your last visit? Hospitalized since your last visit? Yes When: 5/1/17,gall stones,[facility]    2. Have you seen or consulted any other health care providers outside of the [facility] since your last visit? Include any pap smears or colon screening.  No

## 2017-07-26 LAB — TSH SERPL DL<=0.005 MIU/L-ACNC: 1.19 UIU/ML (ref 0.45–4.5)

## 2017-10-26 DIAGNOSIS — F32.9 REACTIVE DEPRESSION: ICD-10-CM

## 2017-10-26 RX ORDER — SERTRALINE HYDROCHLORIDE 50 MG/1
TABLET, FILM COATED ORAL
Qty: 60 TAB | Refills: 2 | Status: SHIPPED | OUTPATIENT
Start: 2017-10-26 | End: 2017-11-03 | Stop reason: SDUPTHER

## 2017-11-03 ENCOUNTER — OFFICE VISIT (OUTPATIENT)
Dept: FAMILY MEDICINE CLINIC | Age: 42
End: 2017-11-03

## 2017-11-03 VITALS
WEIGHT: 231 LBS | BODY MASS INDEX: 36.26 KG/M2 | HEIGHT: 67 IN | TEMPERATURE: 97.5 F | RESPIRATION RATE: 18 BRPM | HEART RATE: 90 BPM | DIASTOLIC BLOOD PRESSURE: 83 MMHG | SYSTOLIC BLOOD PRESSURE: 128 MMHG | OXYGEN SATURATION: 100 %

## 2017-11-03 DIAGNOSIS — F32.9 REACTIVE DEPRESSION: ICD-10-CM

## 2017-11-03 RX ORDER — SERTRALINE HYDROCHLORIDE 50 MG/1
200 TABLET, FILM COATED ORAL DAILY
Qty: 120 TAB | Refills: 3 | Status: SHIPPED | OUTPATIENT
Start: 2017-11-03 | End: 2018-02-20

## 2017-11-03 NOTE — PROGRESS NOTES
Here for followup for anxiety    At last visit patient was started on sertraline due to marital discord    Patient reports that things are about the same    States that the FOB has only seen his kids for 3 hours since June    Recently has had multiple financial drains:  Needs roof replaced, engine in care replaced; had to have a root canal    States that her parents, brother, and friends are helping her to get by    Has to get roof replaced, engine in car replaced, needs root canal    States that  admitted to her that he had been having an affair    Patient states that she was advised by her  that he should only see the kids under her supervision    [address] 13years old  [address] 5year old    Patient states that both of her parents were raised by a single mother     Pt feels that the sertraline is working for her -- not sure she could handle all that is going on with her without it    Advised patient that she could increase by 50 mg weekly up to 200 mg a day, which is the maximum dosage per day    Pt also advised that she needs to schedule an appointment for repeat endometrial biopsy and PAP smear    Greater than 50% of this 20 minute visit was counseling about anxiety and recent marital discord.

## 2017-11-03 NOTE — MR AVS SNAPSHOT
Visit Information Date & Time Provider Department Dept. Phone Encounter #  
 11/3/2017 11:20 AM Dyllan Serrano, Iw[facility] [place] [phone] [id number] Follow-up Instructions Return in about 3 months (around 2/3/2018). Follow-up and Disposition History Upcoming Health Maintenance Date Due INFLUENZA AGE 9 TO ADULT 8/1/2017 PAP AKA CERVICAL CYTOLOGY 10/6/2018 DTaP/Tdap/Td series (3 - Td) 12/2/2025 Allergies as of 11/3/2017  Review Complete On: 11/3/2017 By: Dyllan Serrano MD  
  
 Severity Noted Reaction Type Reactions Hydrocodone  07/21/2011    Shortness of Breath, Vertigo Omeprazole  03/31/2014    Hives Pearla [place] [Theophylline-guaifenesin]  06/24/2010    Other (comments) Hallucinations and violent. Tape [Adhesive] Low 04/06/2016   Topical Rash Current Immunizations  Reviewed on 3/2/2016 Name Date Influenza Vaccine (Quad) PF 9/30/2015 TDAP Vaccine 11/1/2012 Tdap 12/2/2015 10:23 AM  
  
 Not reviewed this visit You Were Diagnosed With   
  
 Codes Comments Reactive depression     ICD-10-CM: F32.9 ICD-9-CM: 300.4 Vitals BP Pulse Temp Resp Height(growth percentile) Weight(growth percentile) 128/83 90 97.5 °F (36.4 °C) (Oral) 18 5' 7\" (1.702 m) 231 lb (104.8 kg) LMP SpO2 BMI OB Status Smoking Status 09/03/2017 100% 36.18 kg/m2 Having regular periods Never Smoker BMI and BSA Data Body Mass Index Body Surface Area  
 36.18 kg/m 2 2.23 m 2 Preferred Pharmacy Pharmacy Name Phone [id number], [address] [phone] Your Updated Medication List  
  
   
This list is accurate as of: 11/3/17  4:38 PM.  Always use your most recent med list.  
  
  
  
  
 levothyroxine 112 mcg tablet Commonly known as:  SYNTHROID Take 1 Tab by mouth Daily (before breakfast). Lower dose  
  
 sertraline 50 mg tablet Commonly known as:  ZOLOFT Take 4 Tabs by mouth daily. Increase to 3 tabs per day for one week, then 4 tabs per day as needed  Indications: ANXIETY WITH DEPRESSION Prescriptions Sent to Pharmacy Refills  
 sertraline (ZOLOFT) 50 mg tablet 3 Sig: Take 4 Tabs by mouth daily. Increase to 3 tabs per day for one week, then 4 tabs per day as needed  Indications: ANXIETY WITH DEPRESSION Class: Normal  
 [facility]acy: [id number]  #: [phone] Route: Oral  
  
Follow-up Instructions Return in about 3 months (around 2/3/2018). Please provide this summary of care documentation to your next provider. Your primary care clinician is listed as [address] [place] [address], . If you have any questions after today's visit, please call [phone].

## 2017-11-21 ENCOUNTER — HOSPITAL ENCOUNTER (OUTPATIENT)
Dept: LAB | Age: 42
Discharge: HOME | End: 2017-11-21
Payer: COMMERCIAL

## 2017-11-21 ENCOUNTER — OFFICE VISIT (OUTPATIENT)
Dept: FAMILY MEDICINE CLINIC | Age: 42
End: 2017-11-21

## 2017-11-21 ENCOUNTER — HOSPITAL ENCOUNTER (OUTPATIENT)
Dept: LAB | Age: 42
Discharge: HOME | End: 2017-11-21

## 2017-11-21 VITALS
WEIGHT: 230 LBS | RESPIRATION RATE: 18 BRPM | SYSTOLIC BLOOD PRESSURE: 128 MMHG | DIASTOLIC BLOOD PRESSURE: 82 MMHG | BODY MASS INDEX: 36.1 KG/M2 | OXYGEN SATURATION: 100 % | HEART RATE: 81 BPM | HEIGHT: 67 IN | TEMPERATURE: 98.1 F

## 2017-11-21 DIAGNOSIS — R87.619 ABNORMAL CERVICAL PAPANICOLAOU SMEAR, UNSPECIFIED ABNORMAL PAP FINDING: ICD-10-CM

## 2017-11-21 DIAGNOSIS — N85.00 ENDOMETRIAL HYPERPLASIA: Primary | ICD-10-CM

## 2017-11-21 DIAGNOSIS — Z98.890: ICD-10-CM

## 2017-11-21 LAB
HCG URINE, QL. (POC): NEGATIVE
VALID INTERNAL CONTROL?: YES

## 2017-11-21 PROCEDURE — 88175 CYTOPATH C/V AUTO FLUID REDO: CPT | Performed by: FAMILY MEDICINE

## 2017-11-21 PROCEDURE — 87624 HPV HI-RISK TYP POOLED RSLT: CPT | Performed by: FAMILY MEDICINE

## 2017-11-21 NOTE — PROGRESS NOTES
1. Have you been to the ER, urgent care clinic since your last visit? Hospitalized since your last visit? No    2. Have you seen or consulted any other health care providers outside of the [address] [place] [address] since your last visit? Include any pap smears or colon screening.  No

## 2017-11-21 NOTE — LETTER
11/29/2017 2:21 PM 
 
Ms. Brandon Stewart [address] [id number] Dear Brandon Sierra Terry: 
 
Please find your most recent results below. Resulted Orders CX-VAG CYTOLOGY Narrative [address]  [facility]      [address]         [address] [facility] [address] [address]      [place], [address]     [address], [address] [facility] 
[phone] Fax# [phone]    Fax# [phone]      Fax# [phone] 
 
========================================================================== 
                       * * * CYTOPATHOLOGY REPORT* * *   
========================================================================== 
GYNECOLOGICAL * * *Procedures/Addenda Present * * * Patient:  Maria Chopra             Specimen #:  [id number] Age:  1975 (Age: 46)                Date of Procedure: 11/21/2017 Sex:  F                                  Date of Receipt:  11/22/2017 Hospital#:  [id number]               Date of Report: 11/27/2017 Med. Record #:  [id number] Location:  [facility]) Physician(s):  Viola Mcclain MD 
 
   
 
 
 
ADDITIONAL PATIENT INFORMATION:  
RFX 12 , 25 / 39 HPV REGARDLESS:  
YES  
SOURCE: 
A: Cervical/Endocervical Imaged Processed Thin Prep 
 
 
============================================================================ 
                                * * * FINAL INTERPRETATION* * * Cervical/Endocervical Imaged Processed Thin Prep Satisfactory for evaluation. NEGATIVE FOR INTRAEPITHELIAL LESION OR MALIGNANCY. Castaneda Waddy,  CT (AS Danish Keel HPV HR Interpretation Test: HPV, high-risk Result: NEGATIVE                         
 
 Reference Interval: Negative This high-risk HPV test detects fourteen high-risk types 
(16/18/31/33/35/39/45/51/52/56/58/59/66/68) without differentiation, using 
nucleic acid amplification method. Test performed by: Jessica Stewart  Dr. Hector Alarcon [address] Phone number:  [phone] [place] * * *Electronically Signed* * * 
11/27/2017 ICD10 Codes: 
 A03.849 N85.00 Z98.89 The Pap test is a screening procedure to aid in the detection of cervical 
cancer and its precursors. It should not be used as the sole means of 
detecting cervical cancer. As with any laboratory test, false negative 
and false positive results are known to occur. RECOMMENDATIONS: 
Negative PAP smear Negative HPV HR  
    
   
 
 
 
Please call me if you have any questions: [phone] Sincerely, [facility] OUTREACH INSURANCE

## 2017-11-21 NOTE — PROGRESS NOTES
Presents for PAP smear and endometrial biopsy. Pt had cervical conization April 2016     Pt had abnormal endometrial cells on biopsy by Dr. Isaac Brewer March 2017 (endometrial hyperplasia with an area of glandular crowding    Returns today to have repeat PAP smear and repeat endometrial biopsy     Urine pregnancy test was done -- negative    Preprocedure Dx: Benign endometrial hyperplasia     Postprocedure Dx: same    Procedure:  endometrial biopsy, ECC    Surgeon:  Cisco Hutchison    Findings:   endometrial cavity sounded to 7 cm; moderate amount of endometrial curettings    Complications:   none    Procedure: The patient was counseled regarding the risks/benefits of the procedure, including bleeding, cramping, and infection. Pt agreed to proceed. Urine pregnancy test was negative. The patient was placed in the dorsal lithotomy position. A sterile speculum was placed in the patients vagina. A PAP smear was obtained in the usual fashion. The cervix was then prepped with betadine solution. The anterior lip of the cervix was then grasped with a single toothed tenaculum. The uterus sounded to 7 cm. The endometrial pipette was then used in the standard fashion to obtain endometrial curettings. Endocervical curettings were obtained as well. The tenaculum was removed; hemostasis was assured with silver nitrate. The speculum was removed from the patients vagina. The patient tolerated the procedure well.      [facility]  OFFICE PROCEDURE PROGRESS NOTE        Chart reviewed for the following:   Efren Mckeon MD, have reviewed the History, Physical and updated the Allergic reactions for Fanta Mendoza     TIME OUT performed immediately prior to start of procedure:   Efren Mckeon MD, have performed the following reviews on [address] [facility] prior to the start of the procedure:            * Patient was identified by name and date of birth   * Agreement on procedure being performed was verified  * Risks and Benefits explained to the patient  * Procedure site verified and marked as necessary  * Patient was positioned for comfort  * Consent was signed and verified     Time: 11:30 am      Date of procedure: 11/21/2017    Procedure performed by:  Jordy Bell MD    Provider assisted by: Arlette Juares LPN    Patient assisted by: self    How tolerated by patient: tolerated the procedure well with no complications    Post Procedural Pain Scale: 2 - Hurts Little Bit    Comments: none

## 2017-11-21 NOTE — LETTER
11/29/2017 2:21 PM 
 
Ms. Brandon Sierra Terry [address] [id number] Dear Brandon Rigo Stewart: 
 
Please find your most recent results below. RECOMMENDATIONS: 
Normal endometrial and endocervical tissue Repeat PAP smear and EMBx in 6 months Normal PAP smear and negative HPV HR Please call me if you have any questions: [phone] Sincerely, LAB OUTREACH CLIENT

## 2017-11-28 NOTE — PROGRESS NOTES
Normal endometrial and endocervical tissue  Repeat PAP smear and EMBx in 6 months  Normal PAP smear and negative HPV HR

## 2018-02-20 ENCOUNTER — APPOINTMENT (OUTPATIENT)
Dept: CT IMAGING | Age: 43
DRG: 444 | End: 2018-02-20
Attending: EMERGENCY MEDICINE
Payer: COMMERCIAL

## 2018-02-20 ENCOUNTER — HOSPITAL ENCOUNTER (INPATIENT)
Age: 43
LOS: 3 days | Discharge: HOME | DRG: 444 | End: 2018-02-23
Attending: EMERGENCY MEDICINE | Admitting: FAMILY MEDICINE
Payer: COMMERCIAL

## 2018-02-20 DIAGNOSIS — K85.80 OTHER ACUTE PANCREATITIS, UNSPECIFIED COMPLICATION STATUS: Primary | ICD-10-CM

## 2018-02-20 PROBLEM — K83.1 COMMON BILE DUCT OBSTRUCTION: Status: ACTIVE | Noted: 2018-02-20

## 2018-02-20 PROBLEM — K85.90 ACUTE PANCREATITIS: Status: ACTIVE | Noted: 2018-02-20

## 2018-02-20 LAB
ALBUMIN SERPL-MCNC: 3.8 G/DL (ref 3.5–5)
ALBUMIN/GLOB SERPL: 0.9 {RATIO} (ref 1.1–2.2)
ALP SERPL-CCNC: 486 U/L (ref 45–117)
ALT SERPL-CCNC: 323 U/L (ref 12–78)
ANION GAP SERPL CALC-SCNC: 9 MMOL/L (ref 5–15)
APPEARANCE UR: ABNORMAL
AST SERPL-CCNC: 191 U/L (ref 15–37)
BACTERIA URNS QL MICRO: NEGATIVE /HPF
BASOPHILS # BLD: 0 K/UL (ref 0–0.1)
BASOPHILS NFR BLD: 0 % (ref 0–1)
BILIRUB SERPL-MCNC: 1.5 MG/DL (ref 0.2–1)
BILIRUB UR QL CFM: POSITIVE
BUN SERPL-MCNC: 6 MG/DL (ref 6–20)
BUN/CREAT SERPL: 7 (ref 12–20)
CALCIUM SERPL-MCNC: 8.9 MG/DL (ref 8.5–10.1)
CAOX CRY URNS QL MICRO: ABNORMAL
CHLORIDE SERPL-SCNC: 103 MMOL/L (ref 97–108)
CO2 SERPL-SCNC: 24 MMOL/L (ref 21–32)
COLOR UR: ABNORMAL
CREAT SERPL-MCNC: 0.89 MG/DL (ref 0.55–1.02)
DIFFERENTIAL METHOD BLD: ABNORMAL
EOSINOPHIL # BLD: 0.1 K/UL (ref 0–0.4)
EOSINOPHIL NFR BLD: 1 % (ref 0–7)
EPITH CASTS URNS QL MICRO: ABNORMAL /LPF
ERYTHROCYTE [DISTWIDTH] IN BLOOD BY AUTOMATED COUNT: 19.4 % (ref 11.5–14.5)
GLOBULIN SER CALC-MCNC: 4.2 G/DL (ref 2–4)
GLUCOSE BLD STRIP.AUTO-MCNC: 116 MG/DL (ref 65–100)
GLUCOSE SERPL-MCNC: 141 MG/DL (ref 65–100)
GLUCOSE UR STRIP.AUTO-MCNC: NEGATIVE MG/DL
HCG UR QL: NEGATIVE
HCT VFR BLD AUTO: 36 % (ref 35–47)
HGB BLD-MCNC: 11 G/DL (ref 11.5–16)
HGB UR QL STRIP: ABNORMAL
IMM GRANULOCYTES # BLD: 0 K/UL (ref 0–0.04)
IMM GRANULOCYTES NFR BLD AUTO: 0 % (ref 0–0.5)
KETONES UR QL STRIP.AUTO: NEGATIVE MG/DL
LEUKOCYTE ESTERASE UR QL STRIP.AUTO: NEGATIVE
LIPASE SERPL-CCNC: >3000 U/L (ref 73–393)
LYMPHOCYTES # BLD: 0.5 K/UL (ref 0.8–3.5)
LYMPHOCYTES NFR BLD: 6 % (ref 12–49)
MCH RBC QN AUTO: 23.6 PG (ref 26–34)
MCHC RBC AUTO-ENTMCNC: 30.6 G/DL (ref 30–36.5)
MCV RBC AUTO: 77.3 FL (ref 80–99)
MONOCYTES # BLD: 0.5 K/UL (ref 0–1)
MONOCYTES NFR BLD: 6 % (ref 5–13)
NEUTS SEG # BLD: 7.4 K/UL (ref 1.8–8)
NEUTS SEG NFR BLD: 87 % (ref 32–75)
NITRITE UR QL STRIP.AUTO: NEGATIVE
NRBC # BLD: 0 K/UL (ref 0–0.01)
NRBC BLD-RTO: 0 PER 100 WBC
PH UR STRIP: 5.5 [PH] (ref 5–8)
PLATELET # BLD AUTO: 251 K/UL (ref 150–400)
PMV BLD AUTO: 11.5 FL (ref 8.9–12.9)
POTASSIUM SERPL-SCNC: 3.4 MMOL/L (ref 3.5–5.1)
PROT SERPL-MCNC: 8 G/DL (ref 6.4–8.2)
PROT UR STRIP-MCNC: ABNORMAL MG/DL
RBC # BLD AUTO: 4.66 M/UL (ref 3.8–5.2)
RBC #/AREA URNS HPF: ABNORMAL /HPF (ref 0–5)
RBC MORPH BLD: ABNORMAL
RBC MORPH BLD: ABNORMAL
SERVICE CMNT-IMP: ABNORMAL
SODIUM SERPL-SCNC: 136 MMOL/L (ref 136–145)
SP GR UR REFRACTOMETRY: 1.02 (ref 1–1.03)
UR CULT HOLD, URHOLD: NORMAL
UROBILINOGEN UR QL STRIP.AUTO: 2 EU/DL (ref 0.2–1)
WBC # BLD AUTO: 8.5 K/UL (ref 3.6–11)
WBC URNS QL MICRO: ABNORMAL /HPF (ref 0–4)

## 2018-02-20 PROCEDURE — 96374 THER/PROPH/DIAG INJ IV PUSH: CPT

## 2018-02-20 PROCEDURE — 99218 HC RM OBSERVATION: CPT

## 2018-02-20 PROCEDURE — 96375 TX/PRO/DX INJ NEW DRUG ADDON: CPT

## 2018-02-20 PROCEDURE — 80053 COMPREHEN METABOLIC PANEL: CPT | Performed by: EMERGENCY MEDICINE

## 2018-02-20 PROCEDURE — 74176 CT ABD & PELVIS W/O CONTRAST: CPT

## 2018-02-20 PROCEDURE — 82962 GLUCOSE BLOOD TEST: CPT

## 2018-02-20 PROCEDURE — 81001 URINALYSIS AUTO W/SCOPE: CPT | Performed by: EMERGENCY MEDICINE

## 2018-02-20 PROCEDURE — 83690 ASSAY OF LIPASE: CPT | Performed by: EMERGENCY MEDICINE

## 2018-02-20 PROCEDURE — 74011250636 HC RX REV CODE- 250/636: Performed by: STUDENT IN AN ORGANIZED HEALTH CARE EDUCATION/TRAINING PROGRAM

## 2018-02-20 PROCEDURE — 65270000029 HC RM PRIVATE

## 2018-02-20 PROCEDURE — 36415 COLL VENOUS BLD VENIPUNCTURE: CPT | Performed by: EMERGENCY MEDICINE

## 2018-02-20 PROCEDURE — 99284 EMERGENCY DEPT VISIT MOD MDM: CPT

## 2018-02-20 PROCEDURE — 74011250636 HC RX REV CODE- 250/636: Performed by: EMERGENCY MEDICINE

## 2018-02-20 PROCEDURE — 81025 URINE PREGNANCY TEST: CPT

## 2018-02-20 PROCEDURE — 85025 COMPLETE CBC W/AUTO DIFF WBC: CPT | Performed by: EMERGENCY MEDICINE

## 2018-02-20 RX ORDER — ONDANSETRON 2 MG/ML
4 INJECTION INTRAMUSCULAR; INTRAVENOUS
Status: COMPLETED | OUTPATIENT
Start: 2018-02-20 | End: 2018-02-20

## 2018-02-20 RX ORDER — SERTRALINE HYDROCHLORIDE 50 MG/1
200 TABLET, FILM COATED ORAL DAILY
Status: DISCONTINUED | OUTPATIENT
Start: 2018-02-21 | End: 2018-02-23 | Stop reason: HOSPADM

## 2018-02-20 RX ORDER — SODIUM CHLORIDE 0.9 % (FLUSH) 0.9 %
5-10 SYRINGE (ML) INJECTION EVERY 8 HOURS
Status: DISCONTINUED | OUTPATIENT
Start: 2018-02-20 | End: 2018-02-23 | Stop reason: HOSPADM

## 2018-02-20 RX ORDER — INSULIN LISPRO 100 [IU]/ML
INJECTION, SOLUTION INTRAVENOUS; SUBCUTANEOUS EVERY 6 HOURS
Status: DISCONTINUED | OUTPATIENT
Start: 2018-02-21 | End: 2018-02-23 | Stop reason: HOSPADM

## 2018-02-20 RX ORDER — SODIUM CHLORIDE 0.9 % (FLUSH) 0.9 %
5-10 SYRINGE (ML) INJECTION AS NEEDED
Status: DISCONTINUED | OUTPATIENT
Start: 2018-02-20 | End: 2018-02-23 | Stop reason: HOSPADM

## 2018-02-20 RX ORDER — LEVOTHYROXINE SODIUM 112 UG/1
112 TABLET ORAL
Status: DISCONTINUED | OUTPATIENT
Start: 2018-02-21 | End: 2018-02-23 | Stop reason: HOSPADM

## 2018-02-20 RX ORDER — DEXTROSE 50 % IN WATER (D50W) INTRAVENOUS SYRINGE
12.5-25 AS NEEDED
Status: DISCONTINUED | OUTPATIENT
Start: 2018-02-20 | End: 2018-02-23 | Stop reason: HOSPADM

## 2018-02-20 RX ORDER — SERTRALINE HYDROCHLORIDE 50 MG/1
200 TABLET, FILM COATED ORAL DAILY
COMMUNITY
End: 2018-02-28 | Stop reason: SDUPTHER

## 2018-02-20 RX ORDER — ONDANSETRON 2 MG/ML
4 INJECTION INTRAMUSCULAR; INTRAVENOUS
Status: DISCONTINUED | OUTPATIENT
Start: 2018-02-20 | End: 2018-02-23 | Stop reason: HOSPADM

## 2018-02-20 RX ORDER — SODIUM CHLORIDE, SODIUM LACTATE, POTASSIUM CHLORIDE, CALCIUM CHLORIDE 600; 310; 30; 20 MG/100ML; MG/100ML; MG/100ML; MG/100ML
150 INJECTION, SOLUTION INTRAVENOUS CONTINUOUS
Status: DISCONTINUED | OUTPATIENT
Start: 2018-02-20 | End: 2018-02-23

## 2018-02-20 RX ORDER — KETOROLAC TROMETHAMINE 30 MG/ML
15 INJECTION, SOLUTION INTRAMUSCULAR; INTRAVENOUS
Status: DISCONTINUED | OUTPATIENT
Start: 2018-02-20 | End: 2018-02-23 | Stop reason: HOSPADM

## 2018-02-20 RX ORDER — MAGNESIUM SULFATE 100 %
4 CRYSTALS MISCELLANEOUS AS NEEDED
Status: DISCONTINUED | OUTPATIENT
Start: 2018-02-20 | End: 2018-02-23 | Stop reason: HOSPADM

## 2018-02-20 RX ORDER — KETOROLAC TROMETHAMINE 30 MG/ML
15 INJECTION, SOLUTION INTRAMUSCULAR; INTRAVENOUS
Status: COMPLETED | OUTPATIENT
Start: 2018-02-20 | End: 2018-02-20

## 2018-02-20 RX ADMIN — KETOROLAC TROMETHAMINE 15 MG: 30 INJECTION, SOLUTION INTRAMUSCULAR at 19:01

## 2018-02-20 RX ADMIN — ONDANSETRON 4 MG: 2 INJECTION INTRAMUSCULAR; INTRAVENOUS at 19:00

## 2018-02-20 RX ADMIN — SODIUM CHLORIDE, SODIUM LACTATE, POTASSIUM CHLORIDE, AND CALCIUM CHLORIDE 200 ML/HR: 600; 310; 30; 20 INJECTION, SOLUTION INTRAVENOUS at 21:35

## 2018-02-20 NOTE — Clinical Note
Patient Class[de-identified] Observation [715] Type of Bed: Medical [8] Reason for Observation: suspected common bile duct stone Admitting Diagnosis: Common bile duct obstruction [2785631] Admitting Diagnosis: Acute pancreatitis [577. 0. ICD-9-CM] Admitting Physician: Aldo Velazco Attending Physician: Aldo Velazco

## 2018-02-20 NOTE — IP AVS SNAPSHOT
[address] 
[phone] Patient: Roxy Hyde MRN: [id number] :1975 A check bonnie indicates which time of day the medication should be taken. My Medications CONTINUE taking these medications Instructions Each Dose to Equal  
 Morning Noon Evening Bedtime  
 levothyroxine 112 mcg tablet Commonly known as:  SYNTHROID Your last dose was: Your next dose is: Take 1 Tab by mouth Daily (before breakfast). Lower dose 112 mcg  
    
   
   
   
  
 sertraline 50 mg tablet Commonly known as:  ZOLOFT Your last dose was: Your next dose is: Take 200 mg by mouth daily.   
 200 mg

## 2018-02-20 NOTE — ED TRIAGE NOTES
Pt c/o LUQ pain and in to back since 1400 today. Pt took motrin at 1400. Also with nausea.  H/o gall bladder removal.
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
## 2018-02-20 NOTE — IP AVS SNAPSHOT
[address] [facility] 
 
 
 [facility] [address] [facility] 
[phone] Patient: Jere Love MRN: [id number] :1975 About your hospitalization You were admitted on:  2018 You last received care in the:  [facility] 4M POST SURG ORT 1 You were discharged on:  2018 Why you were hospitalized Your primary diagnosis was:  Acute Pancreatitis Your diagnoses also included:  Common Bile Duct Obstruction, Obesity (Bmi 30-39.9), Acquired Hypothyroidism, Pcos (Polycystic Ovarian Syndrome), Anxiety Follow-up Information Follow up With Details Comments Contact Info Sandra Flores MD Schedule an appointment as soon as possible for a visit in 2 days for follow up [address] [place] [address] 
[phone] Discharge Orders None A check bonnie indicates which time of day the medication should be taken. My Medications CONTINUE taking these medications Instructions Each Dose to Equal  
 Morning Noon Evening Bedtime  
 levothyroxine 112 mcg tablet Commonly known as:  SYNTHROID Your last dose was: Your next dose is: Take 1 Tab by mouth Daily (before breakfast). Lower dose 112 mcg  
    
   
   
   
  
 sertraline 50 mg tablet Commonly known as:  ZOLOFT Your last dose was: Your next dose is: Take 200 mg by mouth daily. 200 mg Discharge Instructions HOME DISCHARGE INSTRUCTIONS Jere Love / [id number] : 1975 Admission date: 2018 Discharge date: 2018 3:14 PM  
 
Please bring this form with you to show your care provider at your follow-up appointment. Primary care provider:  Sandra Flores MD 
 
Discharging provider:  Yovani Curtis MD  - Family Medicine Resident Nando Espinosa MD - Family Medicine Attending You have been admitted to the hospital with the following diagnoses: 
 
ACUTE DIAGNOSES: 
· Common bile duct obstruction · Acute pancreatitis · Acute pancreatitis · Common bile duct obstruction [facility] . . . . . . . . . . . . . . . . . . . . . . . . . . . . . . . . . . . . . . . . . . . . . . . . . . . . . . . . . . . . . . . . . . . . . . . Medication changes: Please review your After- Visit- Summary for full details on your medications. FOLLOW-UP CARE RECOMMENDATIONS: 
 
Appointments Follow-up Information Follow up With Details Comments Contact Info Robert Morfin MD Schedule an appointment as soon as possible for a visit in 2 days for follow up [address] [place] [address] 
[phone] Follow-up tests needed: cbc, cmp, per provider Pending test results: At the time of your discharge the following test results are still pending: acute hepatitis panel, H pilori. Please make sure you review these results with your outpatient follow-up provider(s). Specific symptoms to watch for: chest pain, shortness of breath, fever, chills, nausea, vomiting, diarrhea, change in mentation, falling, weakness, bleeding. DIET/what to eat:  Low fat, Low cholesterol ACTIVITY:  Activity as tolerated What to do if new or unexpected symptoms occur? If you experience any of the above symptoms (or should other concerns or questions arise after discharge) please call your primary care physician. Return to the emergency room if you cannot get hold of your doctor. · It is very important that you keep your follow-up appointment(s). · Please bring discharge papers, medication list (and/or medication bottles) to your follow-up appointments for review by your outpatient provider(s). · Please check the list of medications and be sure it includes every medication (even non-prescription medications) that your provider wants you to take. · It is important that you take the medication exactly as they are prescribed. · Keep your medication in the bottles provided by the pharmacist and keep a list of the medication names, dosages, and times to be taken in your wallet. · Do not take other medications without consulting your doctor. · If you have any questions about your medications or other instructions, please talk to your nurse or care provider before you leave the hospital.  
 
Information obtained by:  
 
I understand that if any problems occur once I am at home I am to contact my physician. These instructions were explained to me and I had the opportunity to ask questions. I understand and acknowledge receipt of the instructions indicated above. Physician's or R.N.'s Signature                                                                  Date/Time Patient or Representative Signature                                                          Date/Time [employer] Announcement We are excited to announce that we are making your provider's discharge notes available to you in [employer]. You will see these notes when they are completed and signed by the physician that discharged you from your recent hospital stay. If you have any questions or concerns about any information you see in payByMobilet, please call the Health Information Department where you were seen or reach out to your Primary Care Provider for more information about your plan of care. Introducing [facility]! Dear Corinne Meyer: 
Thank you for requesting a GeoMe account.   Our records indicate that you have previously registered for a GeoMe account but its currently inactive. Please call our [email or web address] support line at [phone]. Additional Information If you have questions, please visit the Frequently Asked Questions section of the [email or web address] website at [email or web address]/. Remember, MyChart is NOT to be used for urgent needs. For medical emergencies, dial 911. Now available from your iPhone and Android! Providers Seen During Your Hospitalization Provider Specialty Primary office phone Maribeth Andrews MD Emergency Medicine [phone] Sukumar Antonio MD Family Practice [phone] Your Primary Care Physician (PCP) Primary Care Physician Office Phone Office Fax [facility] [phone] You are allergic to the following Allergen Reactions Hydrocodone Shortness of Breath Vertigo Omeprazole Hives Quibron (Theophylline-Guaifenesin) Other (comments) Hallucinations and violent. Tape (Adhesive) Rash Recent Documentation Height Weight BMI OB Status Smoking Status 1.702 m 104.3 kg 36.02 kg/m2 Having regular periods Never Smoker Emergency Contacts Name Discharge Info Relation Home Work Mobile Dannie Peck DISCHARGE CAREGIVER [3] Parent [1] [phone] Patient Belongings The following personal items are in your possession at time of discharge: 
  Dental Appliances: None         Home Medications: None   Jewelry: None  Clothing: At bedside    Other Valuables: Cell Phone Please provide this summary of care documentation to your next provider. Signatures-by signing, you are acknowledging that this After Visit Summary has been reviewed with you and you have received a copy. Patient Signature:  ____________________________________________________________ Date:  ____________________________________________________________  
  
Perla Medellin  Provider Signature: ____________________________________________________________ Date:  ____________________________________________________________

## 2018-02-21 ENCOUNTER — APPOINTMENT (OUTPATIENT)
Dept: MRI IMAGING | Age: 43
DRG: 444 | End: 2018-02-21
Attending: INTERNAL MEDICINE
Payer: COMMERCIAL

## 2018-02-21 LAB
ALBUMIN SERPL-MCNC: 3.1 G/DL (ref 3.5–5)
ALBUMIN/GLOB SERPL: 0.9 {RATIO} (ref 1.1–2.2)
ALP SERPL-CCNC: 421 U/L (ref 45–117)
ALT SERPL-CCNC: 280 U/L (ref 12–78)
ANION GAP SERPL CALC-SCNC: 11 MMOL/L (ref 5–15)
AST SERPL-CCNC: 155 U/L (ref 15–37)
BASOPHILS # BLD: 0 K/UL (ref 0–0.1)
BASOPHILS NFR BLD: 0 % (ref 0–1)
BILIRUB SERPL-MCNC: 1 MG/DL (ref 0.2–1)
BUN SERPL-MCNC: 5 MG/DL (ref 6–20)
BUN/CREAT SERPL: 6 (ref 12–20)
CALCIUM SERPL-MCNC: 8.5 MG/DL (ref 8.5–10.1)
CHLORIDE SERPL-SCNC: 107 MMOL/L (ref 97–108)
CHOLEST SERPL-MCNC: 188 MG/DL
CO2 SERPL-SCNC: 24 MMOL/L (ref 21–32)
CREAT SERPL-MCNC: 0.81 MG/DL (ref 0.55–1.02)
DIFFERENTIAL METHOD BLD: ABNORMAL
EOSINOPHIL # BLD: 0.1 K/UL (ref 0–0.4)
EOSINOPHIL NFR BLD: 1 % (ref 0–7)
ERYTHROCYTE [DISTWIDTH] IN BLOOD BY AUTOMATED COUNT: 19.1 % (ref 11.5–14.5)
EST. AVERAGE GLUCOSE BLD GHB EST-MCNC: NORMAL MG/DL
GLOBULIN SER CALC-MCNC: 3.6 G/DL (ref 2–4)
GLUCOSE BLD STRIP.AUTO-MCNC: 81 MG/DL (ref 65–100)
GLUCOSE BLD STRIP.AUTO-MCNC: 84 MG/DL (ref 65–100)
GLUCOSE BLD STRIP.AUTO-MCNC: 93 MG/DL (ref 65–100)
GLUCOSE SERPL-MCNC: 97 MG/DL (ref 65–100)
HBA1C MFR BLD: 4.5 % (ref 4.2–6.3)
HCT VFR BLD AUTO: 32.3 % (ref 35–47)
HDLC SERPL-MCNC: 47 MG/DL
HDLC SERPL: 4 {RATIO} (ref 0–5)
HGB BLD-MCNC: 9.8 G/DL (ref 11.5–16)
IMM GRANULOCYTES # BLD: 0 K/UL (ref 0–0.04)
IMM GRANULOCYTES NFR BLD AUTO: 0 % (ref 0–0.5)
LDLC SERPL CALC-MCNC: 126.2 MG/DL (ref 0–100)
LIPASE SERPL-CCNC: >3000 U/L (ref 73–393)
LIPID PROFILE,FLP: ABNORMAL
LYMPHOCYTES # BLD: 1.1 K/UL (ref 0.8–3.5)
LYMPHOCYTES NFR BLD: 17 % (ref 12–49)
MCH RBC QN AUTO: 23.3 PG (ref 26–34)
MCHC RBC AUTO-ENTMCNC: 30.3 G/DL (ref 30–36.5)
MCV RBC AUTO: 76.9 FL (ref 80–99)
MONOCYTES # BLD: 0.5 K/UL (ref 0–1)
MONOCYTES NFR BLD: 8 % (ref 5–13)
NEUTS SEG # BLD: 4.9 K/UL (ref 1.8–8)
NEUTS SEG NFR BLD: 74 % (ref 32–75)
NRBC # BLD: 0 K/UL (ref 0–0.01)
NRBC BLD-RTO: 0 PER 100 WBC
PLATELET # BLD AUTO: 208 K/UL (ref 150–400)
PMV BLD AUTO: 11.2 FL (ref 8.9–12.9)
POTASSIUM SERPL-SCNC: 3.3 MMOL/L (ref 3.5–5.1)
PROT SERPL-MCNC: 6.7 G/DL (ref 6.4–8.2)
RBC # BLD AUTO: 4.2 M/UL (ref 3.8–5.2)
SERVICE CMNT-IMP: NORMAL
SODIUM SERPL-SCNC: 142 MMOL/L (ref 136–145)
TRIGL SERPL-MCNC: 74 MG/DL (ref ?–150)
VLDLC SERPL CALC-MCNC: 14.8 MG/DL
WBC # BLD AUTO: 6.6 K/UL (ref 3.6–11)

## 2018-02-21 PROCEDURE — 80061 LIPID PANEL: CPT | Performed by: FAMILY MEDICINE

## 2018-02-21 PROCEDURE — 74011250636 HC RX REV CODE- 250/636: Performed by: STUDENT IN AN ORGANIZED HEALTH CARE EDUCATION/TRAINING PROGRAM

## 2018-02-21 PROCEDURE — 83690 ASSAY OF LIPASE: CPT | Performed by: FAMILY MEDICINE

## 2018-02-21 PROCEDURE — 65270000029 HC RM PRIVATE

## 2018-02-21 PROCEDURE — 82962 GLUCOSE BLOOD TEST: CPT

## 2018-02-21 PROCEDURE — 74011250637 HC RX REV CODE- 250/637: Performed by: STUDENT IN AN ORGANIZED HEALTH CARE EDUCATION/TRAINING PROGRAM

## 2018-02-21 PROCEDURE — 97165 OT EVAL LOW COMPLEX 30 MIN: CPT | Performed by: OCCUPATIONAL THERAPIST

## 2018-02-21 PROCEDURE — 80053 COMPREHEN METABOLIC PANEL: CPT | Performed by: FAMILY MEDICINE

## 2018-02-21 PROCEDURE — 36415 COLL VENOUS BLD VENIPUNCTURE: CPT | Performed by: FAMILY MEDICINE

## 2018-02-21 PROCEDURE — 85025 COMPLETE CBC W/AUTO DIFF WBC: CPT | Performed by: FAMILY MEDICINE

## 2018-02-21 PROCEDURE — 74181 MRI ABDOMEN W/O CONTRAST: CPT

## 2018-02-21 PROCEDURE — 83036 HEMOGLOBIN GLYCOSYLATED A1C: CPT | Performed by: FAMILY MEDICINE

## 2018-02-21 PROCEDURE — 74011000250 HC RX REV CODE- 250: Performed by: FAMILY MEDICINE

## 2018-02-21 RX ORDER — POTASSIUM CHLORIDE 7.45 MG/ML
10 INJECTION INTRAVENOUS
Status: COMPLETED | OUTPATIENT
Start: 2018-02-21 | End: 2018-02-21

## 2018-02-21 RX ORDER — FAMOTIDINE 10 MG/ML
20 INJECTION INTRAVENOUS EVERY 12 HOURS
Status: DISCONTINUED | OUTPATIENT
Start: 2018-02-21 | End: 2018-02-23 | Stop reason: HOSPADM

## 2018-02-21 RX ADMIN — SERTRALINE HYDROCHLORIDE 200 MG: 50 TABLET ORAL at 08:30

## 2018-02-21 RX ADMIN — Medication 10 ML: at 13:25

## 2018-02-21 RX ADMIN — POTASSIUM CHLORIDE 10 MEQ: 10 INJECTION, SOLUTION INTRAVENOUS at 08:30

## 2018-02-21 RX ADMIN — LEVOTHYROXINE SODIUM 112 MCG: 112 TABLET ORAL at 06:12

## 2018-02-21 RX ADMIN — FAMOTIDINE 20 MG: 10 INJECTION, SOLUTION INTRAVENOUS at 08:30

## 2018-02-21 RX ADMIN — POTASSIUM CHLORIDE 10 MEQ: 10 INJECTION, SOLUTION INTRAVENOUS at 13:24

## 2018-02-21 RX ADMIN — FAMOTIDINE 20 MG: 10 INJECTION, SOLUTION INTRAVENOUS at 20:09

## 2018-02-21 RX ADMIN — POTASSIUM CHLORIDE 10 MEQ: 10 INJECTION, SOLUTION INTRAVENOUS at 15:28

## 2018-02-21 RX ADMIN — POTASSIUM CHLORIDE 10 MEQ: 10 INJECTION, SOLUTION INTRAVENOUS at 17:26

## 2018-02-21 NOTE — CDMP QUERY
1.    Please clarify if this patient is (was) being treated/managed for:     => Hypokalemia in the setting of suspected bile duct obstruction and pancreatitis, being treated with IV repletion of potassium   => Other explanation of clinical findings  => Unable to determine (no explanation for clinical findings)    The medical record reflects the following clinical findings, treatment, and risk factors. 37 y/o female presents with abdominal pain and is admitted for suspected bile duct obstruction and pancreatitis. On admission her serum K is 3.4 with next day repeat of 3.3. She is scheduled for IV K 10 meq q hour x 4 doses. Please clarify and document your clinical opinion in the progress notes and discharge summary including the definitive and/or presumptive diagnosis, (suspected or probable), related to the above clinical findings. Please include clinical findings supporting your diagnosis. Thanks for your time.     Jorge Arredondo RN, BSN  [phone]

## 2018-02-21 NOTE — ED NOTES
TRANSFER - OUT REPORT:    Verbal report given to [facility] RN(name) on [email or web address]  being transferred to [facility](unit) for routine progression of care       Report consisted of patients Situation, Background, Assessment and   Recommendations(SBAR). Information from the following report(s) SBAR, ED Summary, [facility] and Recent Results was reviewed with the receiving nurse. Lines:   Peripheral IV 02/20/18 Right Forearm (Active)   Site Assessment Clean, dry, & intact 2/20/2018  7:00 PM   Phlebitis Assessment 0 2/20/2018  7:00 PM   Infiltration Assessment 0 2/20/2018  7:00 PM   Dressing Status Clean, dry, & intact 2/20/2018  7:00 PM   Dressing Type Transparent 2/20/2018  7:00 PM   Hub Color/Line Status Pink 2/20/2018  7:00 PM        Opportunity for questions and clarification was provided.       Patient transported with:   [employer]

## 2018-02-21 NOTE — H&P
[address] [facility] [address] [place] [address]   Office [phone]  Fax [phone]       Admission H&P     Name: Joaquina Cornelius MRN: [id number]  Sex: Female   YOB: 1975  Age: 38 y.o. PCP: Moisés Griggs MD     Source of Information: patient, medical records    Chief complaint: Abdominal pain    History of Present Illness  Joaquina Cornelius is a 38 y.o. 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 Patient states that 3 days ago, she had some left-sided abdominal cramping that disappeared on its own, without medication. However, today, LUQ pain restarted and persisted all morning. Pt reports aggravation of pain with any movement, whether standing or laying down, which radiates toward her back. Patient took Advil at home, without relief of Sx. Pain worsened so much so that she decided to come to the ED to be evaluated. Patient endorses mild nausea associated with pain prior to admission, as well as chills and subjective fever. However, she denies H/A, dizziness, CP, palpitations, vomiting, hematuria, hematochezia, diarrhea/constipation or dysuria. Last BM reported this morning of normal consistency. Patient denies ever being diagnosed with pancreatitis in the past. She had a cholecystectomy in April 2017 due to cholecystitis. In the ER:  -Vital signs: Temp 97.4, HR 65, RR 18, /91, SpO2 100% RA  -Labs were remarkable for Hg 11 (BL~ 12.5-13.5), K 3.4, , , , lipase >3000.  -Imaging: CT A/P showed \" Suspected 2 mm calculus, nonobstructing, in distal left ureter. Nonobstructing calculus measuring 2 mm in the mid left kidney. Splenomegaly again shown. \"  -Treatment: Pt received Zofran 4mg x 1, Toradol 15mg x 1.      Past Medical History:   Diagnosis Date    Abnormal Pap smear     colposcopy, wnl since    Asthma     stress induced(last in january)    Group B Streptococcus carrier, +RV culture, currently pregnant 16    Infertility     IUI to get pregnant    Polycystic ovarian syndrome     Thyroid disease     Trauma     broken R ankle, R foot, L arm      Patient Vitals for the past 12 hrs:   Temp Pulse Resp BP SpO2   18 2228 98.2 °F (36.8 °C) 76 16 125/75 98 %   18 - - - - 96 %   18 - - - 120/57 -   18 - - - - 95 %   18 - - - 134/85 94 %   18 - - - 129/78 95 %   18 - - - (!) 140/98 94 %   18 - - - - 96 %   18 - - - 145/73 96 %   18 - - - 134/85 97 %   18 1813 97.4 °F (36.3 °C) 65 18 (!) 143/91 100 %     Home Medications   Prior to Admission medications    Medication Sig Start Date End Date Taking? Authorizing Provider   sertraline (ZOLOFT) 50 mg tablet Take 200 mg by mouth daily. Yes Historical Provider   levothyroxine (SYNTHROID) 112 mcg tablet Take 1 Tab by mouth Daily (before breakfast). Lower dose 17  Yes Lottie Olmedo MD       Allergies  Allergies   Allergen Reactions    Hydrocodone Shortness of Breath and Vertigo    Omeprazole Hives    Quibron [Theophylline-Guaifenesin] Other (comments)     Hallucinations and violent.      Tape [Adhesive] Rash       Past Medical History:   Diagnosis Date    Abnormal Pap smear     colposcopy, wnl since    Asthma     stress induced(last in january)    Group B Streptococcus carrier, +RV culture, currently pregnant 16    Infertility     IUI to get pregnant    Polycystic ovarian syndrome     Thyroid disease     Trauma     broken R ankle, R foot, L arm       Previous Hospitalization(s)  No recent hospitalizations on file    Past Surgical History:   Procedure Laterality Date     DELIVERY ONLY  12    CKC, AKA COLD KNIFE CONE      HX COLPOSCOPY  3/17/16    HX GYN      Csection X2    HX HEENT      DENTAL       Family History   Problem Relation Age of Onset    Thyroid Disease Mother      also in maternal aunt    Cancer Maternal Grandmother     Cancer Paternal Grandmother      brain tumor    Anesth Problems Neg Hx        Social History  Social History     Social History    Marital status:      Spouse name: N/A    Number of children: N/A    Years of education: N/A     Occupational History    Not on file. Social History Main Topics    Smoking status: Never Smoker    Smokeless tobacco: Never Used    Alcohol use No    Drug use: No    Sexual activity: Not Currently     Partners: Male     Birth control/ protection: None     Other Topics Concern    Not on file     Social History Narrative       Alcohol history: Not at all  Smoking history: Non-smoker  Illicit drug history: Not at all    Living arrangement: patient lives with their family. Ambulates: Independently     Review of Systems  Review of Systems   Constitutional: Positive for chills. Negative for fever. HENT: Positive for sore throat. Negative for congestion. Eyes: Negative for visual disturbance. Respiratory: Negative for cough, chest tightness, shortness of breath and wheezing. Cardiovascular: Negative for chest pain, palpitations and leg swelling. Gastrointestinal: Positive for abdominal pain. Negative for abdominal distention, blood in stool, diarrhea, nausea and vomiting. Endocrine: Negative for polyuria. Genitourinary: Negative for dysuria and hematuria. Musculoskeletal: Negative for myalgias. Skin: Negative for rash. Neurological: Negative for dizziness, seizures, syncope, light-headedness and headaches. Psychiatric/Behavioral: Negative for agitation and confusion. Physical Exam  Objective:  General Appearance:  Comfortable, in pain and in no acute distress. Vital signs: (most recent): Blood pressure 125/75, pulse 76, temperature 98.2 °F (36.8 °C), resp.  rate 16, height 5' 7\" (1.702 m), weight 230 lb (104.3 kg), last menstrual period 02/19/2018, SpO2 98 %, currently breastfeeding. No fever. HEENT: Normal HEENT exam.    Lungs:  Normal respiratory rate and normal effort. She is not in respiratory distress. Breath sounds clear to auscultation. No wheezes, rales or rhonchi. Heart: Normal rate. Regular rhythm. S1 normal and S2 normal.  No murmur. Extremities: There is no deformity or dependent edema. Neurological: Patient is alert and oriented to person, place and time. Skin:  Warm. No rash or ulceration. Abdomen: Abdomen is soft. There is left upper quadrant tenderness. (And left flank pain present). Pupils:  Pupils are equal, round, and reactive to light. Pulses: Distal pulses are intact. O2 Device: Room air     Laboratory Data  Recent Results (from the past 8 hour(s))   CBC WITH AUTOMATED DIFF    Collection Time: 02/20/18  7:05 PM   Result Value Ref Range    WBC 8.5 3.6 - 11.0 K/uL    RBC 4.66 3.80 - 5.20 M/uL    HGB 11.0 (L) 11.5 - 16.0 g/dL    HCT 36.0 35.0 - 47.0 %    MCV 77.3 (L) 80.0 - 99.0 FL    MCH 23.6 (L) 26.0 - 34.0 PG    MCHC 30.6 30.0 - 36.5 g/dL    RDW 19.4 (H) 11.5 - 14.5 %    PLATELET 955 956 - 457 K/uL    MPV 11.5 8.9 - 12.9 FL    NRBC 0.0 0  WBC    ABSOLUTE NRBC 0.00 0.00 - 0.01 K/uL    NEUTROPHILS 87 (H) 32 - 75 %    LYMPHOCYTES 6 (L) 12 - 49 %    MONOCYTES 6 5 - 13 %    EOSINOPHILS 1 0 - 7 %    BASOPHILS 0 0 - 1 %    IMMATURE GRANULOCYTES 0 0.0 - 0.5 %    ABS. NEUTROPHILS 7.4 1.8 - 8.0 K/UL    ABS. LYMPHOCYTES 0.5 (L) 0.8 - 3.5 K/UL    ABS. MONOCYTES 0.5 0.0 - 1.0 K/UL    ABS. EOSINOPHILS 0.1 0.0 - 0.4 K/UL    ABS. BASOPHILS 0.0 0.0 - 0.1 K/UL    ABS. IMM.  GRANS. 0.0 0.00 - 0.04 K/UL    DF SMEAR SCANNED      RBC COMMENTS OVALOCYTES  PRESENT        RBC COMMENTS ANISOCYTOSIS  1+       METABOLIC PANEL, COMPREHENSIVE    Collection Time: 02/20/18  7:05 PM   Result Value Ref Range    Sodium 136 136 - 145 mmol/L    Potassium 3.4 (L) 3.5 - 5.1 mmol/L    Chloride 103 97 - 108 mmol/L    CO2 24 21 - 32 mmol/L    Anion gap 9 5 - 15 mmol/L    Glucose 141 (H) 65 - 100 mg/dL    BUN 6 6 - 20 MG/DL    Creatinine 0.89 0.55 - 1.02 MG/DL    BUN/Creatinine ratio 7 (L) 12 - 20      GFR est AA >60 >60 ml/min/1.73m2    GFR est non-AA >60 >60 ml/min/1.73m2    Calcium 8.9 8.5 - 10.1 MG/DL    Bilirubin, total 1.5 (H) 0.2 - 1.0 MG/DL    ALT (SGPT) 323 (H) 12 - 78 U/L    AST (SGOT) 191 (H) 15 - 37 U/L    Alk. phosphatase 486 (H) 45 - 117 U/L    Protein, total 8.0 6.4 - 8.2 g/dL    Albumin 3.8 3.5 - 5.0 g/dL    Globulin 4.2 (H) 2.0 - 4.0 g/dL    A-G Ratio 0.9 (L) 1.1 - 2.2     URINALYSIS W/MICROSCOPIC    Collection Time: 02/20/18  7:05 PM   Result Value Ref Range    Color DARK YELLOW      Appearance CLOUDY (A) CLEAR      Specific gravity 1.017 1.003 - 1.030      pH (UA) 5.5 5.0 - 8.0      Protein TRACE (A) NEG mg/dL    Glucose NEGATIVE  NEG mg/dL    Ketone NEGATIVE  NEG mg/dL    Blood LARGE (A) NEG      Urobilinogen 2.0 (H) 0.2 - 1.0 EU/dL    Nitrites NEGATIVE  NEG      Leukocyte Esterase NEGATIVE  NEG      WBC 0-4 0 - 4 /hpf    RBC 0-5 0 - 5 /hpf    Epithelial cells FEW FEW /lpf    Bacteria NEGATIVE  NEG /hpf    CA Oxalate crystals 1+ (A) NEG   URINE CULTURE HOLD SAMPLE    Collection Time: 02/20/18  7:05 PM   Result Value Ref Range    Urine culture hold        URINE ON HOLD IN MICROBIOLOGY DEPT FOR 3 DAYS. IF UNPRESERVED URINE IS SUBMITTED, IT CANNOT BE USED FOR ADDITIONAL TESTING AFTER 24 HRS, RECOLLECTION WILL BE REQUIRED.    LIPASE    Collection Time: 02/20/18  7:05 PM   Result Value Ref Range    Lipase >3000 (H) 73 - 393 U/L   BILIRUBIN, CONFIRM    Collection Time: 02/20/18  7:05 PM   Result Value Ref Range    Bilirubin UA, confirm POSITIVE (A) NEG     HCG URINE, QL. - POC    Collection Time: 02/20/18  7:07 PM   Result Value Ref Range    Pregnancy test,urine (POC) NEGATIVE  NEG     GLUCOSE, POC    Collection Time: 02/20/18 11:11 PM   Result Value Ref Range    Glucose (POC) 116 (H) 65 - 100 mg/dL    Performed by Roberto Weston (PCT)        Imaging  CXR Results  (Last 48 hours)    None        CT Results  (Last 48 hours)               02/20/18 1939  CT ABD PELV WO CONT Final result    Impression:  IMPRESSION:       1. Suspected 2 mm calculus, nonobstructing, in distal left ureter. Nonobstructing calculus measuring 2 mm in the mid left kidney. 2. Status post cholecystectomy. 3. Splenomegaly again shown. Clinical correlation recommended. Narrative:  EXAM:  CT ABD PELV WO CONT       INDICATION: Left upper quadrant and left flank pain, back pain since 1400 hours   today. COMPARISON: CT 5.17       CONTRAST:  None. TECHNIQUE:    Thin axial images were obtained through the abdomen and pelvis. Coronal and   sagittal reconstructions were generated. Oral contrast was not administered. CT   dose reduction was achieved through use of a standardized protocol tailored for   this examination and automatic exposure control for dose modulation. The absence of intravenous and oral contrast material reduces the capacity of CT   to evaluate the solid parenchymal organs of the abdomen as well as the bowel. FINDINGS:    LUNG BASES: Clear. INCIDENTALLY IMAGED HEART AND MEDIASTINUM: Unremarkable. LIVER: No mass or biliary dilatation. GALLBLADDER: Status post cholecystectomy. SPLEEN: Mild splenomegaly again shown with length of 13.5 cm. PANCREAS: No mass or ductal dilatation. ADRENALS: Unremarkable. KIDNEYS/URETERS: 2 mm nonobstructing calculus mid pole of left kidney. No renal   pelvocaliectasis or ureteral dilation. There is a 2 mm calcification   demonstrated proximal to the level of the left ureterovesical junction which is   suspicious for a distal ureteral calculus. STOMACH: Unremarkable. SMALL BOWEL: No dilatation or wall thickening. COLON: Unremarkable. Unchanged tiny calcification posterior to right cecal apex. APPENDIX: Not demonstrated. PERITONEUM: No ascites or pneumoperitoneum.    RETROPERITONEUM: No lymphadenopathy or aortic aneurysm. REPRODUCTIVE ORGANS: Unremarkable. URINARY BLADDER: No mass or calculus. BONES: Unremarkable. ADDITIONAL COMMENTS: N/A                     Assessment and Plan     Kit Valdez is a 38 y.o. female with Pmhx of PCOS, anxiety, depression, and hypothyroidism who is admitted for suspected common bile duct obstruction and pancreatitis. Possible Common bile duct obstruction- Not seen on imaging, but pt with Tbili 1.5, elevates LFTs with abdominal pain  -Admit on inpatient to medical, VS per unit, I/Os  -GI consulted, planning on ERCP in AM. Appreciate recs  -Toradol prn for pain and Zofran prn for nausea  -Daily CBC and CMP    Pancreatitis- POA lipase >3000 with abdominal pain  -NPO, LR at 200cc/hr  -Checking POC gluc Q6 and SSI Q 6 in setting of NPO and pancreatitis. Anxiety/Depression- Stable  -Continue home Zoloft 50mg daily    Hypothyroidism  -Continue home Synthroid 112mcg daily    Obesity  - PT with BMI of Body mass index is 36.02 kg/(m^2). - Encouraging lifestyle modifications and further follow up outpatient. FEN/GI - NPO. Lactated Ringer's at 200 mL/hr. Activity - Ambulate as tolerated  DVT prophylaxis - SCDs  GI prophylaxis - Not indicated at this time  Fall prophylaxis - Not indicated at this time. Disposition - Admit to Medical. Plan to d/c to Home. Consulting PT/OT  Code Status - Full, discussed with patient / caregivers. Patient Kit Valdez will be discussed Dr. Shanta Dodge.     11:46 PM, 02/20/18  Baldev Block MD  Family Medicine Resident       For Billing    Chief Complaint   Patient presents with   [facility] Abdominal Pain       Hospital Problems  Date Reviewed: 11/21/2017          Codes Class Noted POA    Acute pancreatitis ICD-10-CM: K85.90  ICD-9-CM: 517.0  2/20/2018 Unknown        Common bile duct obstruction ICD-10-CM: K83.1  ICD-9-CM: 576.2  2/20/2018 Unknown

## 2018-02-21 NOTE — PROGRESS NOTES
Occupational Therapy EVALUATION/discharge  Patient: Siena Addison (38 y.o. female)  Date: 2018  Primary Diagnosis: Common bile duct obstruction  Acute pancreatitis  Acute pancreatitis  Common bile duct obstruction        Precautions: standard       ASSESSMENT:   Based on the objective data described below, the patient presents at an overall independent level with ADLs and functional mobility. She demonstrated good safety awareness, no LOB and is up ad roderick in room. Pt is the mother of 2 young children, is active and drives. Further skilled acute occupational therapy is not indicated at this time. Discharge Recommendations: None  Further Equipment Recommendations for Discharge: none      SUBJECTIVE:   Patient stated I am fine right now. I don't have pain all of the time    OBJECTIVE DATA SUMMARY:   HISTORY:   Past Medical History:   Diagnosis Date    Abnormal Pap smear     colposcopy, wnl since    Asthma     stress induced(last in january)    Group B Streptococcus carrier, +RV culture, currently pregnant 16    Infertility     IUI to get pregnant    Polycystic ovarian syndrome     Thyroid disease     Trauma     broken R ankle, R foot, L arm     Past Surgical History:   Procedure Laterality Date     DELIVERY ONLY  12    CKC, AKA COLD KNIFE CONE      HX COLPOSCOPY  3/17/16    HX GYN      Csection X2    HX HEENT      DENTAL       Prior Level of Function/Environment/Context: Pt is the mother of 2 young children, is active and drives  Home Situation  Home Environment: Private residence  One/Two Story Residence: Two story  Living Alone: No  Support Systems: Family member(s)  Patient Expects to be Discharged to[de-identified] Patient room  Current DME Used/Available at Home: None  [x]  Right hand dominant   []  Left hand dominant    EXAMINATION OF PERFORMANCE DEFICITS:  Cognitive/Behavioral Status:  Neurologic State: Alert; Appropriate for age  Orientation Level: Oriented X4  Cognition: Appropriate decision making; Appropriate for age attention/concentration; Appropriate safety awareness; Follows commands  Perception: Appears intact  Perseveration: No perseveration noted  Safety/Judgement: Awareness of environment;Driving appropriateness; Fall prevention;Good awareness of safety precautions; Home safety; Insight into deficits    Hearing: Auditory  Auditory Impairment: None    Vision/Perceptual:    Acuity: Within Defined Limits         Range of Motion:  AROM: Within functional limits  PROM: Within functional limits                      Strength:  Strength: Within functional limits                Coordination:  Coordination: Within functional limits  Fine Motor Skills-Upper: Left Intact; Right Intact    Gross Motor Skills-Upper: Left Intact; Right Intact    Tone & Sensation:  Tone: Normal  Sensation: Intact                      Balance:  Sitting: Intact  Standing: Intact    Functional Mobility and Transfers for ADLs:  Bed Mobility:  Rolling: Independent  Supine to Sit: Independent  Sit to Supine: Independent  Scooting: Independent    Transfers:  Sit to Stand: Independent  Stand to Sit: Independent  Bed to Chair: Independent  Toilet Transfer : Independent    ADL Assessment:  Feeding: Independent    Oral Facial Hygiene/Grooming: Independent    Bathing: Independent    Upper Body Dressing: Independent    Lower Body Dressing: Independent    Toileting: Independent                ADL Intervention and task modifications:  Patient was educated on the benefits of maintaining activity tolerance, functional mobility, and independence with self care tasks during acute stay. Encouraged patient to be out of bed for all meals, perform daily ADLs (as approved by RN/MD regarding bathing etc), performing functional mobility to/from bathroom, and increasing time OOB daily. Patient educated about the importance of maintaining activity tolerance to ensure safe return home and to baseline.  Patient verbalized understanding of education. Cognitive Retraining  Safety/Judgement: Awareness of environment;Driving appropriateness; Fall prevention;Good awareness of safety precautions; Home safety; Insight into deficits    Functional Measure:  Barthel Index:    Bathin  Bladder: 10  Bowels: 10  Groomin  Dressing: 10  Feeding: 10  Mobility: 15  Stairs: 10  Toilet Use: 10  Transfer (Bed to Chair and Back): 15  Total: 100       Barthel and G-code impairment scale:  Percentage of impairment CH  0% CI  1-19% CJ  20-39% CK  40-59% CL  60-79% CM  80-99% CN  100%   Barthel Score 0-100 100 99-80 79-60 59-40 20-39 1-19   0   Barthel Score 0-20 20 17-19 13-16 9-12 5-8 1-4 0      The Barthel ADL Index: Guidelines  1. The index should be used as a record of what a patient does, not as a record of what a patient could do. 2. The main aim is to establish degree of independence from any help, physical or verbal, however minor and for whatever reason. 3. The need for supervision renders the patient not independent. 4. A patient's performance should be established using the best available evidence. Asking the patient, friends/relatives and nurses are the usual sources, but direct observation and common sense are also important. However direct testing is not needed. 5. Usually the patient's performance over the preceding 24-48 hours is important, but occasionally longer periods will be relevant. 6. Middle categories imply that the patient supplies over 50 per cent of the effort. 7. Use of aids to be independent is allowed. Joseph Jacobs., Barthel, D.W. (2860). Functional evaluation: the Barthel Index. [facility] (14)2. RADHA Archer, Frank Alves., Thomas Wilson., Ced Manuel, [address] [place] [address] (). Measuring the change indisability after inpatient rehabilitation; comparison of the responsiveness of the Barthel Index and Functional [place] Measure. Journal of Neurology, Neurosurgery, and Psychiatry, 66(4), 038-281.   ALEX Leong.BRIGIDO, Surinder op TASNEEM Dawson, Nelly Boucher M.A. (2004.) Assessment of post-stroke quality of life in cost-effectiveness studies: The usefulness of the Barthel Index and the EuroQoL-5D. Quality of Life Research, 15, 215-05       Occupational Therapy Evaluation Charge Determination   History Examination Decision-Making   LOW Complexity : Brief history review  LOW Complexity : 1-3 performance deficits relating to physical, cognitive , or psychosocial skils that result in activity limitations and / or participation restrictions  LOW Complexity : No comorbidities that affect functional and no verbal or physical assistance needed to complete eval tasks       Based on the above components, the patient evaluation is determined to be of the following complexity level: LOW   Pain:  Pain Scale 1: Numeric (0 - 10)  Pain Intensity 1: 0              Activity Tolerance:   Good  Please refer to the flowsheet for vital signs taken during this treatment. After treatment:   []  Patient left in no apparent distress sitting up in chair  [x]  Patient left in no apparent distress in bed  [x]  Call bell left within reach  [x]  Nursing notified  []  Caregiver present  []  Bed alarm activated    COMMUNICATION/EDUCATION:   Communication/Collaboration:  [x]      Home safety education was provided and the patient/caregiver indicated understanding. [x]      Patient/family have participated as able and agree with findings and recommendations. []      Patient is unable to participate in plan of care at this time.   Findings and recommendations were discussed with: Registered Nurse    Ramya Red OT  Time Calculation: 8 mins

## 2018-02-21 NOTE — PROGRESS NOTES
Problem: Pain  Goal: *Control of Pain  Outcome: Progressing Towards Goal  Pt pain controled with toradol admin in the er, no additional requests for pain meds, pt resting.       Primary Nurse Sofia Lehman RN and Rosa Field RN performed a dual skin assessment on this patient No impairment noted  Crow score is 19

## 2018-02-21 NOTE — PROGRESS NOTES
[facility]: MED RECONCILIATION    Comments/Recommendations:    Patient seem knowledgeable about her medications (able to provide name, dose, and frequency)    Medication(s) ADDED to PTA list:  1. none    Medication(s) REMOVED from PTA list:  1. none    Medication(s) ADJUSTED on PTA list:  1. Changed Sertraline to 200 mg daily    Information obtained from: Rx Query/ Patient      Allergies: Hydrocodone; Omeprazole; Quibron [theophylline-guaifenesin]; and Tape [adhesive]    Prior to Admission Medications:     Medication Documentation Review Audit       Reviewed by Girma Mclaughlin PHARMWALDEMAR (Pharmacist) on 02/20/18 at 7218         Medication Sig Documenting Provider Last Dose Status Taking?      levothyroxine (SYNTHROID) 112 mcg tablet Take 1 Tab by mouth Daily (before breakfast). Lower dose Sapphire Arnett MD 2/20/2018 Unknown time Active Yes    sertraline (ZOLOFT) 50 mg tablet Take 200 mg by mouth daily.  Historical Provider 2/20/2018 am Active Yes                        CASTRO Joyner   Contact: [phone]

## 2018-02-21 NOTE — PROGRESS NOTES
2/21/2018  1:59 PM    Met with patient to discuss discharge planning. Patient lives with her 2 children in a 2 story home with 3 steps to enter and 12 within. She elected to change her ICE contact as she is  from her  to her father: Linda Galeana [phone] or . Confirmed all other demographics are correct. She has never used Home Health and has no DME equipment. Patient confirmed RX coverage and uses [facility] [address] Management Intervention  PCP verified by CM (Gm Magana)  Current Support Network (parents)  Discharge Location (TBD)  Confirm Follow Transport (family)  Yesy Au  Care Management Interventions  PCP Verified by CM: Yes  Mode of Transport at Discharge: Self  Transition of Care Consult (CM Consult): Discharge Planning  Physical Therapy Consult: Yes  Occupational Therapy Consult: Yes  Speech Therapy Consult: No  Current Support Network: Family Lives Nearby  Confirm Follow Up Transport: Family  Plan discussed with Pt/Family/Caregiver:  Yes

## 2018-02-21 NOTE — PROGRESS NOTES
[facility]   Senior Resident Admission Note    CC: LUQ pain    HPI:  Ngozi Llanos is a 38 y.o. female who presents to the ER complaining of sudden LUQ pain. Pain presented on the afternoon of 2/20/28, sudden, sharp, mostly located in the LUQ with radiation to the Lt back, no improvement with Motrin, worse with palpation of the epigastric or LUQ regions. There is associated nausea. Denies fatigue, dizziness, headaches, changes in vision, CP, SOB, dysuria, hematuria, melena, hematochezia, leg swelling, or any other complains at this moment. Physical exam remarkable for epigastric and LUQ tenderness. Labs remarkable for K 3.4, Glycemia 141, TBili 1.5, , , , lipase >3000, UA: large blood, Bili (+), Urobili 2.0, Oxalate crystals (1+). CT Abd showed a suspected 2 mm calculus, non-obstructing, in distal Lt ureter, s/p cholecystectomy, splenomegaly as also shown on CT Abd in 5/2017. Patient is admitted for acute pancreatitis and a suspected biliary duct obstruction. Chart reviewed. A/P: Symptoms likely d/t acute pancreatitis, suspected to be 2/2 common bile duct obstruction as LFTs and Bili are elevated, along with lipase >3000. Will work up for other possible causes of acute pancreatitis. - Admit to medical floor  - NPO, IVF LR at 200 mL/hr  -Toradol prn for pain and Zofran prn for nausea  -F/U Lipid panel, HgbA1C  -F/U MRCP  -Consult GI for evaluation of possible biliary obstruction, ERCP if needed  -Daily CBC and CMP    Patient seen, examined, and discussed with Dr. Tasha Olson (PGY-1). For the remaining assessment and plan of other medical problems please refer to Dr. Collado Fore H&P for more details.     Pt discussed with on-call attending physician    Adore Mckenna MD  Family Medicine Resident

## 2018-02-21 NOTE — PROGRESS NOTES
Spiritual Care Partner Volunteer visited patient in [facility] on 2/21/18.   Documented by:  Dhaval Rivera [address] ([id number])
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
## 2018-02-21 NOTE — PROGRESS NOTES
[facility] RESIDENCY PROGRAM   Daily Progress Note    Date: 2/21/2018    Assessment/Plan:   Alison Figueroa is a 38 y.o. female  Pmhx of PCOS, anxiety, depression, and hypothyroidism who is admitted for suspected common bile duct obstruction and pancreatitis.        Possible Common bile duct obstruction- Not seen on imaging, but pt with Tbili 1.5, elevates LFTs with abdominal pain  -GI consulted, planning on MRCP and possible ERCP this AM. Appreciate recs  -Toradol prn for pain and Zofran prn for nausea  -Daily CBC and CMP     Pancreatitis- POA lipase >3000 with abdominal pain  -NPO, LR at 200cc/hr. Will advance diet as tolerated after MRCP/ERCP  -Checking POC gluc Q6 and SSI Q 6, in setting of NPO and pancreatitis.       Hypokalemia- POA 3.4  -K 3.3 this AM, replete as needed  -Daily CMP    Anxiety/Depression- Stable  -Continue home Zoloft 50mg daily, once PO     Hypothyroidism  -Continue home Synthroid 112mcg daily, once PO     Obesity  - PT with BMI of Body mass index is 36.02 kg/(m^2). - Encouraging lifestyle modifications and further follow up outpatient.         FEN/GI - NPO. Lactated Ringer's at 200 mL/hr. Activity - Ambulate as tolerated  DVT prophylaxis - SCDs  GI prophylaxis - Pepcid  Fall prophylaxis - Not indicated at this time. Disposition - Admit to Medical. Plan to d/c to Home. Consulting PT/OT  Code Status - Full    Patient will be discussed with Dr. Link Flores MD  Family Medicine Resident         CC: Abdominal pain    Subjective  No acute events overnight. Patient found awake and in bed this AM. She states that abdominal pain is much improved this AM. She currently denies chills, chest pain, shortness of breath, palpitations, nausea and vomiting, and LE edema but endorses mild H/A due to lack of sleep since admission.  She is awaiting MRCP this AM.    Inpatient Medications  Current Facility-Administered Medications   Medication Dose Route Frequency    famotidine (PF) (PEPCID) injection 20 mg  20 mg IntraVENous Q12H    sodium chloride (NS) flush 5-10 mL  5-10 mL IntraVENous Q8H    sodium chloride (NS) flush 5-10 mL  5-10 mL IntraVENous PRN    levothyroxine (SYNTHROID) tablet 112 mcg  112 mcg Oral ACB    sertraline (ZOLOFT) tablet 200 mg  200 mg Oral DAILY    lactated Ringers infusion  200 mL/hr IntraVENous CONTINUOUS    ketorolac (TORADOL) injection 15 mg  15 mg IntraVENous Q6H PRN    ondansetron (ZOFRAN) injection 4 mg  4 mg IntraVENous Q6H PRN    insulin lispro (HUMALOG) injection   SubCUTAneous Q6H    glucose chewable tablet 16 g  4 Tab Oral PRN    dextrose (D50W) injection syrg 12.5-25 g  12.5-25 g IntraVENous PRN    glucagon (GLUCAGEN) injection 1 mg  1 mg IntraMUSCular PRN         Allergies  Allergies   Allergen Reactions    Hydrocodone Shortness of Breath and Vertigo    Omeprazole Hives    Quibron [Theophylline-Guaifenesin] Other (comments)     Hallucinations and violent.  Tape [Adhesive] Rash         Objective  Vitals:  Patient Vitals for the past 8 hrs:   Temp Pulse Resp BP SpO2   02/21/18 0420 97.8 °F (36.6 °C) 72 15 121/71 96 %   02/20/18 2228 98.2 °F (36.8 °C) 76 16 125/75 98 %   02/20/18 2201 - - - - 96 %   02/20/18 2200 - - - 120/57 -   02/20/18 2139 - - - - 95 %   02/20/18 2130 - - - 134/85 94 %   02/20/18 2115 - - - 129/78 95 %   02/20/18 2100 - - - (!) 140/98 94 %   02/20/18 2052 - - - - 96 %   02/20/18 2045 - - - 145/73 96 %   02/20/18 2039 - - - 134/85 97 %         I/O:    Intake/Output Summary (Last 24 hours) at 02/21/18 0429  Last data filed at 02/20/18 2322   Gross per 24 hour   Intake           596.67 ml   Output                0 ml   Net           596.67 ml     Last shift:    02/20 1901 - 02/21 0700  In: 596.7 [P.O.:240; I.V.:356.7]  Out: -   Last 3 shifts:         Physical Exam:  General: No acute distress. Alert. Cooperative. Head: Normocephalic. Atraumatic. Eyes:  Conjunctiva pink. Sclera white. PERRL.    Nose: Septum midline. Mucosa pink. No drainage. Throat: Mucosa pink. Moist mucous membranes. No tonsillar exudates or erythema. Palate movement equal bilaterally. Respiratory: CTAB. No w/r/r/c.   Cardiovascular: RRR. Normal S1,S2. No m/r/g. Pulses 2+ throughout. GI: + bowel sounds. Mild tenderness on LUQ. No rebound tenderness or guarding. Nondistended   Extremities: No edema. No palpable cord. No tenderness. Laboratory Data  Recent Results (from the past 12 hour(s))   CBC WITH AUTOMATED DIFF    Collection Time: 02/20/18  7:05 PM   Result Value Ref Range    WBC 8.5 3.6 - 11.0 K/uL    RBC 4.66 3.80 - 5.20 M/uL    HGB 11.0 (L) 11.5 - 16.0 g/dL    HCT 36.0 35.0 - 47.0 %    MCV 77.3 (L) 80.0 - 99.0 FL    MCH 23.6 (L) 26.0 - 34.0 PG    MCHC 30.6 30.0 - 36.5 g/dL    RDW 19.4 (H) 11.5 - 14.5 %    PLATELET 272 381 - 250 K/uL    MPV 11.5 8.9 - 12.9 FL    NRBC 0.0 0  WBC    ABSOLUTE NRBC 0.00 0.00 - 0.01 K/uL    NEUTROPHILS 87 (H) 32 - 75 %    LYMPHOCYTES 6 (L) 12 - 49 %    MONOCYTES 6 5 - 13 %    EOSINOPHILS 1 0 - 7 %    BASOPHILS 0 0 - 1 %    IMMATURE GRANULOCYTES 0 0.0 - 0.5 %    ABS. NEUTROPHILS 7.4 1.8 - 8.0 K/UL    ABS. LYMPHOCYTES 0.5 (L) 0.8 - 3.5 K/UL    ABS. MONOCYTES 0.5 0.0 - 1.0 K/UL    ABS. EOSINOPHILS 0.1 0.0 - 0.4 K/UL    ABS. BASOPHILS 0.0 0.0 - 0.1 K/UL    ABS. IMM.  GRANS. 0.0 0.00 - 0.04 K/UL    DF SMEAR SCANNED      RBC COMMENTS OVALOCYTES  PRESENT        RBC COMMENTS ANISOCYTOSIS  1+       METABOLIC PANEL, COMPREHENSIVE    Collection Time: 02/20/18  7:05 PM   Result Value Ref Range    Sodium 136 136 - 145 mmol/L    Potassium 3.4 (L) 3.5 - 5.1 mmol/L    Chloride 103 97 - 108 mmol/L    CO2 24 21 - 32 mmol/L    Anion gap 9 5 - 15 mmol/L    Glucose 141 (H) 65 - 100 mg/dL    BUN 6 6 - 20 MG/DL    Creatinine 0.89 0.55 - 1.02 MG/DL    BUN/Creatinine ratio 7 (L) 12 - 20      GFR est AA >60 >60 ml/min/1.73m2    GFR est non-AA >60 >60 ml/min/1.73m2    Calcium 8.9 8.5 - 10.1 MG/DL    Bilirubin, total 1.5 (H) 0.2 - 1.0 MG/DL    ALT (SGPT) 323 (H) 12 - 78 U/L    AST (SGOT) 191 (H) 15 - 37 U/L    Alk. phosphatase 486 (H) 45 - 117 U/L    Protein, total 8.0 6.4 - 8.2 g/dL    Albumin 3.8 3.5 - 5.0 g/dL    Globulin 4.2 (H) 2.0 - 4.0 g/dL    A-G Ratio 0.9 (L) 1.1 - 2.2     URINALYSIS W/MICROSCOPIC    Collection Time: 02/20/18  7:05 PM   Result Value Ref Range    Color DARK YELLOW      Appearance CLOUDY (A) CLEAR      Specific gravity 1.017 1.003 - 1.030      pH (UA) 5.5 5.0 - 8.0      Protein TRACE (A) NEG mg/dL    Glucose NEGATIVE  NEG mg/dL    Ketone NEGATIVE  NEG mg/dL    Blood LARGE (A) NEG      Urobilinogen 2.0 (H) 0.2 - 1.0 EU/dL    Nitrites NEGATIVE  NEG      Leukocyte Esterase NEGATIVE  NEG      WBC 0-4 0 - 4 /hpf    RBC 0-5 0 - 5 /hpf    Epithelial cells FEW FEW /lpf    Bacteria NEGATIVE  NEG /hpf    CA Oxalate crystals 1+ (A) NEG   URINE CULTURE HOLD SAMPLE    Collection Time: 02/20/18  7:05 PM   Result Value Ref Range    Urine culture hold        URINE ON HOLD IN MICROBIOLOGY DEPT FOR 3 DAYS. IF UNPRESERVED URINE IS SUBMITTED, IT CANNOT BE USED FOR ADDITIONAL TESTING AFTER 24 HRS, RECOLLECTION WILL BE REQUIRED. LIPASE    Collection Time: 02/20/18  7:05 PM   Result Value Ref Range    Lipase >3000 (H) 73 - 393 U/L   BILIRUBIN, CONFIRM    Collection Time: 02/20/18  7:05 PM   Result Value Ref Range    Bilirubin UA, confirm POSITIVE (A) NEG     HCG URINE, QL. - POC    Collection Time: 02/20/18  7:07 PM   Result Value Ref Range    Pregnancy test,urine (POC) NEGATIVE  NEG     GLUCOSE, POC    Collection Time: 02/20/18 11:11 PM   Result Value Ref Range    Glucose (POC) 116 (H) 65 - 100 mg/dL    Performed by Roberto Weston (PCT)        Imaging  CT A/P WO CONT:  \"1. Suspected 2 mm calculus, nonobstructing, in distal left ureter. Nonobstructing calculus measuring 2 mm in the mid left kidney. 2. Status post cholecystectomy. 3. Splenomegaly again shown. Clinical correlation recommended. \"      Hospital Problems:  Hospital Problems  Date Reviewed: 11/21/2017          Codes Class Noted POA    * (Principal)Acute pancreatitis ICD-10-CM: K85.90  ICD-9-CM: 166.9  2/20/2018 Yes        Common bile duct obstruction ICD-10-CM: K83.1  ICD-9-CM: 576.2  2/20/2018 Yes        Anxiety ICD-10-CM: F41.9  ICD-9-CM: 300.00  7/25/2017 Yes        PCOS (polycystic ovarian syndrome) ICD-10-CM: E28.2  ICD-9-CM: 256.4  1/12/2017 Yes        Acquired hypothyroidism ICD-10-CM: E03.9  ICD-9-CM: 244.9  11/23/2016 Yes        Obesity (BMI 30-39. 9) ICD-10-CM: E66.9  ICD-9-CM: 278.00  10/6/2016 Yes

## 2018-02-22 ENCOUNTER — APPOINTMENT (OUTPATIENT)
Dept: GENERAL RADIOLOGY | Age: 43
DRG: 444 | End: 2018-02-22
Attending: INTERNAL MEDICINE
Payer: COMMERCIAL

## 2018-02-22 ENCOUNTER — ANESTHESIA EVENT (OUTPATIENT)
Dept: ENDOSCOPY | Age: 43
DRG: 444 | End: 2018-02-22
Payer: COMMERCIAL

## 2018-02-22 ENCOUNTER — ANESTHESIA (OUTPATIENT)
Dept: ENDOSCOPY | Age: 43
DRG: 444 | End: 2018-02-22
Payer: COMMERCIAL

## 2018-02-22 LAB
ALBUMIN SERPL-MCNC: 3 G/DL (ref 3.5–5)
ALBUMIN/GLOB SERPL: 0.9 {RATIO} (ref 1.1–2.2)
ALP SERPL-CCNC: 367 U/L (ref 45–117)
ALT SERPL-CCNC: 200 U/L (ref 12–78)
ANION GAP SERPL CALC-SCNC: 7 MMOL/L (ref 5–15)
AST SERPL-CCNC: 70 U/L (ref 15–37)
BASOPHILS # BLD: 0 K/UL (ref 0–0.1)
BASOPHILS NFR BLD: 0 % (ref 0–1)
BILIRUB SERPL-MCNC: 0.5 MG/DL (ref 0.2–1)
BUN SERPL-MCNC: 9 MG/DL (ref 6–20)
BUN/CREAT SERPL: 10 (ref 12–20)
CALCIUM SERPL-MCNC: 8.4 MG/DL (ref 8.5–10.1)
CHLORIDE SERPL-SCNC: 109 MMOL/L (ref 97–108)
CO2 SERPL-SCNC: 26 MMOL/L (ref 21–32)
CREAT SERPL-MCNC: 0.87 MG/DL (ref 0.55–1.02)
DIFFERENTIAL METHOD BLD: ABNORMAL
EOSINOPHIL # BLD: 0.2 K/UL (ref 0–0.4)
EOSINOPHIL NFR BLD: 3 % (ref 0–7)
ERYTHROCYTE [DISTWIDTH] IN BLOOD BY AUTOMATED COUNT: 19.6 % (ref 11.5–14.5)
GLOBULIN SER CALC-MCNC: 3.5 G/DL (ref 2–4)
GLUCOSE BLD STRIP.AUTO-MCNC: 114 MG/DL (ref 65–100)
GLUCOSE BLD STRIP.AUTO-MCNC: 115 MG/DL (ref 65–100)
GLUCOSE BLD STRIP.AUTO-MCNC: 117 MG/DL (ref 65–100)
GLUCOSE BLD STRIP.AUTO-MCNC: 68 MG/DL (ref 65–100)
GLUCOSE BLD STRIP.AUTO-MCNC: 68 MG/DL (ref 65–100)
GLUCOSE BLD STRIP.AUTO-MCNC: 70 MG/DL (ref 65–100)
GLUCOSE BLD STRIP.AUTO-MCNC: 70 MG/DL (ref 65–100)
GLUCOSE BLD STRIP.AUTO-MCNC: 99 MG/DL (ref 65–100)
GLUCOSE SERPL-MCNC: 84 MG/DL (ref 65–100)
HCT VFR BLD AUTO: 32.5 % (ref 35–47)
HGB BLD-MCNC: 9.8 G/DL (ref 11.5–16)
IMM GRANULOCYTES # BLD: 0 K/UL (ref 0–0.04)
IMM GRANULOCYTES NFR BLD AUTO: 0 % (ref 0–0.5)
LYMPHOCYTES # BLD: 1.3 K/UL (ref 0.8–3.5)
LYMPHOCYTES NFR BLD: 22 % (ref 12–49)
MCH RBC QN AUTO: 23.4 PG (ref 26–34)
MCHC RBC AUTO-ENTMCNC: 30.2 G/DL (ref 30–36.5)
MCV RBC AUTO: 77.8 FL (ref 80–99)
MONOCYTES # BLD: 0.5 K/UL (ref 0–1)
MONOCYTES NFR BLD: 9 % (ref 5–13)
NEUTS SEG # BLD: 3.9 K/UL (ref 1.8–8)
NEUTS SEG NFR BLD: 66 % (ref 32–75)
NRBC # BLD: 0 K/UL (ref 0–0.01)
NRBC BLD-RTO: 0 PER 100 WBC
PLATELET # BLD AUTO: 188 K/UL (ref 150–400)
PMV BLD AUTO: 10.9 FL (ref 8.9–12.9)
POTASSIUM SERPL-SCNC: 3.6 MMOL/L (ref 3.5–5.1)
PROT SERPL-MCNC: 6.5 G/DL (ref 6.4–8.2)
RBC # BLD AUTO: 4.18 M/UL (ref 3.8–5.2)
SERVICE CMNT-IMP: ABNORMAL
SERVICE CMNT-IMP: NORMAL
SODIUM SERPL-SCNC: 142 MMOL/L (ref 136–145)
WBC # BLD AUTO: 6 K/UL (ref 3.6–11)

## 2018-02-22 PROCEDURE — 0FC98ZZ EXTIRPATION OF MATTER FROM COMMON BILE DUCT, VIA NATURAL OR ARTIFICIAL OPENING ENDOSCOPIC: ICD-10-PCS | Performed by: INTERNAL MEDICINE

## 2018-02-22 PROCEDURE — 74011000250 HC RX REV CODE- 250: Performed by: FAMILY MEDICINE

## 2018-02-22 PROCEDURE — 74011250636 HC RX REV CODE- 250/636

## 2018-02-22 PROCEDURE — 74011250637 HC RX REV CODE- 250/637: Performed by: INTERNAL MEDICINE

## 2018-02-22 PROCEDURE — 80053 COMPREHEN METABOLIC PANEL: CPT | Performed by: FAMILY MEDICINE

## 2018-02-22 PROCEDURE — 76040000007: Performed by: INTERNAL MEDICINE

## 2018-02-22 PROCEDURE — 77030011640 HC PAD GRND REM COVD -A: Performed by: INTERNAL MEDICINE

## 2018-02-22 PROCEDURE — 85025 COMPLETE CBC W/AUTO DIFF WBC: CPT | Performed by: FAMILY MEDICINE

## 2018-02-22 PROCEDURE — 65270000029 HC RM PRIVATE

## 2018-02-22 PROCEDURE — 77030007288 HC DEV LOK BILI BSC -A: Performed by: INTERNAL MEDICINE

## 2018-02-22 PROCEDURE — 74011000250 HC RX REV CODE- 250

## 2018-02-22 PROCEDURE — 77030008684 HC TU ET CUF COVD -B: Performed by: ANESTHESIOLOGY

## 2018-02-22 PROCEDURE — 77030026438 HC STYL ET INTUB CARD -A: Performed by: ANESTHESIOLOGY

## 2018-02-22 PROCEDURE — 74330 X-RAY BILE/PANC ENDOSCOPY: CPT

## 2018-02-22 PROCEDURE — 76060000032 HC ANESTHESIA 0.5 TO 1 HR: Performed by: INTERNAL MEDICINE

## 2018-02-22 PROCEDURE — 74011636320 HC RX REV CODE- 636/320: Performed by: INTERNAL MEDICINE

## 2018-02-22 PROCEDURE — 74011250637 HC RX REV CODE- 250/637: Performed by: STUDENT IN AN ORGANIZED HEALTH CARE EDUCATION/TRAINING PROGRAM

## 2018-02-22 PROCEDURE — 74011250636 HC RX REV CODE- 250/636: Performed by: STUDENT IN AN ORGANIZED HEALTH CARE EDUCATION/TRAINING PROGRAM

## 2018-02-22 PROCEDURE — 77030009038 HC CATH BILI STN RTVR BSC -C: Performed by: INTERNAL MEDICINE

## 2018-02-22 PROCEDURE — 36415 COLL VENOUS BLD VENIPUNCTURE: CPT | Performed by: FAMILY MEDICINE

## 2018-02-22 PROCEDURE — 74011000250 HC RX REV CODE- 250: Performed by: STUDENT IN AN ORGANIZED HEALTH CARE EDUCATION/TRAINING PROGRAM

## 2018-02-22 PROCEDURE — 82962 GLUCOSE BLOOD TEST: CPT

## 2018-02-22 PROCEDURE — 74011000258 HC RX REV CODE- 258: Performed by: ANESTHESIOLOGY

## 2018-02-22 RX ORDER — DEXTROMETHORPHAN/PSEUDOEPHED 2.5-7.5/.8
1.2 DROPS ORAL
Status: DISCONTINUED | OUTPATIENT
Start: 2018-02-22 | End: 2018-02-22 | Stop reason: HOSPADM

## 2018-02-22 RX ORDER — PROPOFOL 10 MG/ML
INJECTION, EMULSION INTRAVENOUS AS NEEDED
Status: DISCONTINUED | OUTPATIENT
Start: 2018-02-22 | End: 2018-02-22 | Stop reason: HOSPADM

## 2018-02-22 RX ORDER — MIDAZOLAM HYDROCHLORIDE 1 MG/ML
INJECTION, SOLUTION INTRAMUSCULAR; INTRAVENOUS AS NEEDED
Status: DISCONTINUED | OUTPATIENT
Start: 2018-02-22 | End: 2018-02-22 | Stop reason: HOSPADM

## 2018-02-22 RX ORDER — ONDANSETRON 2 MG/ML
INJECTION INTRAMUSCULAR; INTRAVENOUS AS NEEDED
Status: DISCONTINUED | OUTPATIENT
Start: 2018-02-22 | End: 2018-02-22 | Stop reason: HOSPADM

## 2018-02-22 RX ORDER — SUCCINYLCHOLINE CHLORIDE 20 MG/ML
INJECTION INTRAMUSCULAR; INTRAVENOUS AS NEEDED
Status: DISCONTINUED | OUTPATIENT
Start: 2018-02-22 | End: 2018-02-22 | Stop reason: HOSPADM

## 2018-02-22 RX ORDER — NALOXONE HYDROCHLORIDE 0.4 MG/ML
0.4 INJECTION, SOLUTION INTRAMUSCULAR; INTRAVENOUS; SUBCUTANEOUS
Status: DISCONTINUED | OUTPATIENT
Start: 2018-02-22 | End: 2018-02-22 | Stop reason: HOSPADM

## 2018-02-22 RX ORDER — ATROPINE SULFATE 0.1 MG/ML
0.4 INJECTION INTRAVENOUS
Status: DISCONTINUED | OUTPATIENT
Start: 2018-02-22 | End: 2018-02-22 | Stop reason: HOSPADM

## 2018-02-22 RX ORDER — FLUMAZENIL 0.1 MG/ML
0.2 INJECTION INTRAVENOUS
Status: DISCONTINUED | OUTPATIENT
Start: 2018-02-22 | End: 2018-02-22 | Stop reason: HOSPADM

## 2018-02-22 RX ORDER — LIDOCAINE HYDROCHLORIDE 20 MG/ML
INJECTION, SOLUTION EPIDURAL; INFILTRATION; INTRACAUDAL; PERINEURAL AS NEEDED
Status: DISCONTINUED | OUTPATIENT
Start: 2018-02-22 | End: 2018-02-22 | Stop reason: HOSPADM

## 2018-02-22 RX ORDER — DEXTROSE MONOHYDRATE 25 G/50ML
25 INJECTION, SOLUTION INTRAVENOUS
Status: COMPLETED | OUTPATIENT
Start: 2018-02-22 | End: 2018-02-22

## 2018-02-22 RX ORDER — FENTANYL CITRATE 50 UG/ML
INJECTION, SOLUTION INTRAMUSCULAR; INTRAVENOUS AS NEEDED
Status: DISCONTINUED | OUTPATIENT
Start: 2018-02-22 | End: 2018-02-22 | Stop reason: HOSPADM

## 2018-02-22 RX ORDER — SODIUM CHLORIDE 9 MG/ML
50 INJECTION, SOLUTION INTRAVENOUS CONTINUOUS
Status: DISPENSED | OUTPATIENT
Start: 2018-02-22 | End: 2018-02-22

## 2018-02-22 RX ORDER — EPINEPHRINE 0.1 MG/ML
1 INJECTION INTRACARDIAC; INTRAVENOUS
Status: DISCONTINUED | OUTPATIENT
Start: 2018-02-22 | End: 2018-02-22 | Stop reason: HOSPADM

## 2018-02-22 RX ORDER — MIDAZOLAM HYDROCHLORIDE 1 MG/ML
.25-5 INJECTION, SOLUTION INTRAMUSCULAR; INTRAVENOUS
Status: DISCONTINUED | OUTPATIENT
Start: 2018-02-22 | End: 2018-02-22 | Stop reason: HOSPADM

## 2018-02-22 RX ORDER — DEXAMETHASONE SODIUM PHOSPHATE 4 MG/ML
INJECTION, SOLUTION INTRA-ARTICULAR; INTRALESIONAL; INTRAMUSCULAR; INTRAVENOUS; SOFT TISSUE AS NEEDED
Status: DISCONTINUED | OUTPATIENT
Start: 2018-02-22 | End: 2018-02-22 | Stop reason: HOSPADM

## 2018-02-22 RX ORDER — ROCURONIUM BROMIDE 10 MG/ML
INJECTION, SOLUTION INTRAVENOUS AS NEEDED
Status: DISCONTINUED | OUTPATIENT
Start: 2018-02-22 | End: 2018-02-22 | Stop reason: HOSPADM

## 2018-02-22 RX ADMIN — Medication 10 ML: at 14:00

## 2018-02-22 RX ADMIN — PROPOFOL 220 MG: 10 INJECTION, EMULSION INTRAVENOUS at 16:30

## 2018-02-22 RX ADMIN — SERTRALINE HYDROCHLORIDE 200 MG: 50 TABLET ORAL at 12:05

## 2018-02-22 RX ADMIN — INDOMETHACIN 100 MG: 50 SUPPOSITORY RECTAL at 16:52

## 2018-02-22 RX ADMIN — DEXTROSE MONOHYDRATE 12.5 G: 500 INJECTION PARENTERAL at 06:04

## 2018-02-22 RX ADMIN — DEXTROSE MONOHYDRATE 12.5 G: 25 INJECTION, SOLUTION INTRAVENOUS at 15:46

## 2018-02-22 RX ADMIN — ROCURONIUM BROMIDE 8 MG: 10 INJECTION, SOLUTION INTRAVENOUS at 16:30

## 2018-02-22 RX ADMIN — DEXAMETHASONE SODIUM PHOSPHATE 4 MG: 4 INJECTION, SOLUTION INTRA-ARTICULAR; INTRALESIONAL; INTRAMUSCULAR; INTRAVENOUS; SOFT TISSUE at 16:56

## 2018-02-22 RX ADMIN — LEVOTHYROXINE SODIUM 112 MCG: 112 TABLET ORAL at 05:39

## 2018-02-22 RX ADMIN — FAMOTIDINE 20 MG: 10 INJECTION, SOLUTION INTRAVENOUS at 12:05

## 2018-02-22 RX ADMIN — ONDANSETRON 4 MG: 2 INJECTION INTRAMUSCULAR; INTRAVENOUS at 16:55

## 2018-02-22 RX ADMIN — SUCCINYLCHOLINE CHLORIDE 140 MG: 20 INJECTION INTRAMUSCULAR; INTRAVENOUS at 16:30

## 2018-02-22 RX ADMIN — MIDAZOLAM HYDROCHLORIDE 2 MG: 1 INJECTION, SOLUTION INTRAMUSCULAR; INTRAVENOUS at 16:28

## 2018-02-22 RX ADMIN — DEXTROSE MONOHYDRATE 25 G: 500 INJECTION PARENTERAL at 01:06

## 2018-02-22 RX ADMIN — Medication 16 G: at 12:15

## 2018-02-22 RX ADMIN — SODIUM CHLORIDE, SODIUM LACTATE, POTASSIUM CHLORIDE, AND CALCIUM CHLORIDE 150 ML/HR: 600; 310; 30; 20 INJECTION, SOLUTION INTRAVENOUS at 15:25

## 2018-02-22 RX ADMIN — FAMOTIDINE 20 MG: 10 INJECTION, SOLUTION INTRAVENOUS at 22:28

## 2018-02-22 RX ADMIN — LIDOCAINE HYDROCHLORIDE 40 MG: 20 INJECTION, SOLUTION EPIDURAL; INFILTRATION; INTRACAUDAL; PERINEURAL at 16:30

## 2018-02-22 RX ADMIN — FENTANYL CITRATE 100 MCG: 50 INJECTION, SOLUTION INTRAMUSCULAR; INTRAVENOUS at 16:30

## 2018-02-22 RX ADMIN — IOPAMIDOL 15 ML: 612 INJECTION, SOLUTION INTRAVENOUS at 17:13

## 2018-02-22 NOTE — ANESTHESIA POSTPROCEDURE EVALUATION
Post-Anesthesia Evaluation and Assessment    Patient: Lyndsey Etienne MRN: [id number]  SSN: [id number]    YOB: 1975  Age: 38 y.o. Sex: female       Cardiovascular Function/Vital Signs  Visit Vitals    /84    Pulse 69    Temp 37.1 °C (98.8 °F)    Resp 17    Ht 5' 7\" (1.702 m)    Wt 104.3 kg (230 lb)    SpO2 96%    BMI 36.02 kg/m2       Patient is status post general anesthesia for Procedure(s):  ENDOSCOPIC RETROGRADE CHOLANGIOPANCREATOGRAPHY (ERCP) with fluoro  ENDOSCOPIC SPHINCTEROTOMY  ENDOSCOPIC STONE EXTRACTION/BALLOON SWEEP. Nausea/Vomiting: None    Postoperative hydration reviewed and adequate. Pain:  Pain Scale 1: Numeric (0 - 10) (02/22/18 1744)  Pain Intensity 1: 0 (02/22/18 1744)   Managed    Neurological Status:   Neuro  Neurologic State: Alert; Appropriate for age (02/22/18 0745)  Orientation Level: Oriented X4 (02/22/18 0745)  Cognition: Appropriate decision making; Appropriate for age attention/concentration; Appropriate safety awareness (02/22/18 0745)  Speech: Clear (02/22/18 0745)   At baseline    Mental Status and Level of Consciousness: Arousable    Pulmonary Status:   O2 Device: Room air (02/22/18 1744)   Adequate oxygenation and airway patent    Complications related to anesthesia: None    Post-anesthesia assessment completed.  No concerns    Signed By: Kapil Guerra MD     February 22, 2018

## 2018-02-22 NOTE — PERIOP NOTES
Fanta Lopezne  1975  [id number]    Situation:    Scheduled Procedure: Procedure(s):  ENDOSCOPIC RETROGRADE CHOLANGIOPANCREATOGRAPHY (ERCP)  Verbal report received from: Judith Mendez RN  Preoperative diagnosis: abnormal MRCP    Background:    Procedure: Procedure(s):  ENDOSCOPIC RETROGRADE CHOLANGIOPANCREATOGRAPHY (ERCP)  Physician performing procedure; Dr. Nadia Hanley RN    NPO Status/Last PO Intake: 3 days    Pregnancy Test:Yes If yes, result: negative    Is the patient taking Blood Thinners: NO If yes, list: n/a and last taken n/a  Is the patient diabetic:yes       If yes, what was the last BS:  117  Time taken? 0  Anything given? no           Does the patient have a Pacemaker/Defibrillator in place?: no   Does the patient need antibiotics before/during/after procedure: no   If the patient is having a colon, How much prep was drank? n/a   What were the Colon prep results? n/a   Does the patient have SCD in place:no   Is patient on CONTACT precautions:no            Assessment:  Are the vital signs stable prior to patient coming to ENDO?  yes  Is the patient alert/oriented and able to sign consent for the procedures:yes  How does the patient's abdomen feel prior to coming to ENDO? Will assess in endo  Does the patient have a patient IV in place?  no     Recommendation:  Family or Friend present no    Permission to share finding with Family or Friend n/a    ERCP in Endo scheduled for 1600

## 2018-02-22 NOTE — PERIOP NOTES
Report form Alma Lagunas, see anesthesia record. ABD remains soft and non-tender post procedure. Pt has no complaints at this time and tolerated the procedure well.

## 2018-02-22 NOTE — PERIOP NOTES
TRANSFER - OUT REPORT:    Verbal report given to Rachell Canavan, RN (name) on Anthony Carr  being transferred to 4th floor (unit) for ordered procedure       Report consisted of patients Situation, Background, Assessment and   Recommendations(SBAR). Information from the following report(s) SBAR and Procedure Summary was reviewed with the receiving nurse. Lines:   Peripheral IV 02/22/18 Right Hand (Active)   Site Assessment Clean, dry, & intact 2/22/2018  3:14 PM   Phlebitis Assessment 0 2/22/2018  3:14 PM   Infiltration Assessment 0 2/22/2018  3:14 PM   Dressing Status Clean, dry, & intact 2/22/2018  3:14 PM   Dressing Type Tape;Transparent 2/22/2018  3:14 PM   Hub Color/Line Status Pink 2/22/2018  3:14 PM        Opportunity for questions and clarification was provided.       Patient transported with:   [employer]

## 2018-02-22 NOTE — ANESTHESIA PREPROCEDURE EVALUATION
Anesthetic History   No history of anesthetic complications            Review of Systems / Medical History  Patient summary reviewed, nursing notes reviewed and pertinent labs reviewed    Pulmonary  Within defined limits          Asthma        Neuro/Psych   Within defined limits           Cardiovascular  Within defined limits                Exercise tolerance: >4 METS     GI/Hepatic/Renal  Within defined limits              Endo/Other  Within defined limits    Hypothyroidism  Obesity     Other Findings   Comments: PCOS           Physical Exam    Airway  Mallampati: II    Neck ROM: normal range of motion   Mouth opening: Normal     Cardiovascular  Regular rate and rhythm,  S1 and S2 normal,  no murmur, click, rub, or gallop  Rhythm: regular  Rate: normal         Dental  No notable dental hx       Pulmonary  Breath sounds clear to auscultation               Abdominal  GI exam deferred       Other Findings            Anesthetic Plan    ASA: 2  Anesthesia type: general          Induction: Intravenous  Anesthetic plan and risks discussed with: Patient

## 2018-02-22 NOTE — DIABETES MGMT
Progress Note     Chart reviewed for low blood glucose ( < 80 mg/dL x 1 in the past 24 hours) . Recommendations/ Comments: If appropriate, please consider the followin. Changing correction from normal sensitivity (starting at >139) to high sensitivity (starting at >199)   2. Adding dextrose to IVF to decrease risk for glucose < 70 mg/dL. Morning glucose: 68 mg/dL (per am POCT Glucose). Required 0 units of correction in the last 24 hours. Inpatient medications for glucose management:  1. Correction Scale: Lispro (Humalog) Normal Sensitivity scale to cover for glucose > 139 mg/dL Every 6 hours       POC Glucose last 24hrs:   Lab Results   Component Value Date/Time    GLU 84 2018 02:35 AM    GLUCPOC 68 2018 11:54 AM    GLUCPOC 99 2018 06:32 AM    GLUCPOC 68 2018 06:00 AM        Estimated Creatinine Clearance: 106.8 mL/min (based on Cr of 0.87). Diet order:   Active Orders   Diet    DIET NPO      PO intake: No data found. History of Diabetes:   Curtis Musa is a 38 y.o. female with a past medical history significant for PCOS, pancreatitis per Calderon Onofre MD's H&P dated 2018. Prior to admission medications for diabetes: per past medical records  -none for DM     A1C:   Lab Results   Component Value Date/Time    Hemoglobin A1c 4.5 2018 05:30 AM       Reference range*:  Increased risk for diabetes: 5.7 - 6.4%  Diabetes: >6.4%  Glycemic control for adults with diabetes: <7.0 %    *EDSON TIDWELL (2014). Diagnosis and classification of diabetes mellitus. Diabetes care, 37, S81. Thank you. Lance Rodriguez. Elver MPH, RN, BSN, [phone]    -For most hospitalized persons with hyperglycemia in the intensive care unit (ICU), a glucose range of 140 to 180 mg/dL is recommended, provided this target can be safely achieved. *  - For general medicine and surgery patients in non-ICU settings, a premeal glucose target <140 mg/dL and a random blood glucose <180 mg/dL are recommended. *    LYNSEY Siu., Deyvi Granados., Varinder Caban., ... & Jade Gallardo ([id number]). AMERICAN ASSOCIATION OF CLINICAL ENDOCRINOLOGISTS AND AMERICAN COLLEGE OF ENDOCRINOLOGY-CLINICAL PRACTICE GUIDELINES FOR DEVELOPING A DIABETES MELLITUS COMPREHENSIVE CARE PLAN-2015-EXECUTIVE SUMMARY: Complete guidelines are available at [email or web address] aace. com/publications/guidelines. Endocrine Practice, 21(4), M852661.

## 2018-02-22 NOTE — PROCEDURES
Urvashi Pendleton M.D.  [phone]           2018              ERCP Operative Report    Fanta Mendoza  :  1975  [facility] Medical Record Number:  [id number]      Procedure Type:   ERCPwith biliary sphincterotomy, biliary stone removal     Indications: abnormal CT/MRCP, acute pancreatitis    : Leonard Cruz MD    Referring Provider:    Maryann Velazco MD    Exam:  Airway: clear, no airway problems anticipated  Heart: RRR, without gallops or rubs  Lungs: clear bilaterally without wheezes, crackles, or rhonchi  Abdomen: soft, nontender, nondistended, bowel sounds present  Mental Status: awake, alert and oriented to person, place and time    Sedation:  General anesthesia    Procedure Details:  After discussing in detail the procedure with all its risks and potential complications including but not limited to bleeding, perforation, increased risk of pancreatitis with potential lethal complications related to the pancreatitis, sepsis and multiorgan failure and complications related to sedation, informed consent was obtained. Ample amount of time was allowed for questions and alternatives to this procedure were provided. The patient was taken to the fluoroscopy suite and placed in the prone position. Upon sequential sedation as per above, the Olympus duodenoscope [id number]   was inserted via the mouthpiece and carefully advanced to the stomach where all fluid secretions were suctioned and then the scope advanced to the second portion of the duodenum. The quality of visualization was excellent. The duodenoscope was withdrawn into a short position. Findings:   Esophagus:normal  Stomach: normal   Duodenum/jejunum: normal    Ampulla:-normal   Cholangiogram: About 8 mm CBD and CHD. Haziness noted in the distal common bile duct. Otherwise no abnormality. Pancreatogram: Not performed, PD not cannulated    Specimen Removed:  None    Complications: None.      EBL: None.    Interventions:      Pancreatic: none  Biliary: After selective cannulation of the common bile duct using the dreamtome sphincterotome, adequate sphincterotomy was performed using ERBE cautery. No bleeding occurred. A 0.035 fr guidewire was advance into the intrahepatic ducts. The sphincterotome was then exchanged over the guidewire with a 9 to 12 mm biliary balloon. Multiple balloon sweeps were performed. Two greenish yellow stones about 6 to 8 mm in size were retrieved and small amount of sludge. Occlusion cholangiogram was obtained. No further filling defects seen. Impression:      Two small stones and sludge were retrieved. Recommendations:     Clear liquid diet this evening. Hoping to advance diet in am if no further abdominal pain.   Repeat labs in am.          Doreen Mejia MD  2/22/2018  5:17 PM

## 2018-02-22 NOTE — PROGRESS NOTES
[facility]   Daily Progress Note    Date: 2/22/2018    Assessment/Plan:   Zaira Salas is a 38 y.o. female  Pmhx of PCOS, anxiety, depression, and hypothyroidism who is admitted for suspected common bile duct obstruction and pancreatitis. Pancreatitis- POA lipase >3000 with abdominal pain. MRCP shows some 3mm filling defect. Pancrease normal  -NPO, LR at 150 cc/hr. Jimmy Joaquin  - GI following     Elevated LFTs- possible bile duct obstruction vs hepatic origin( RIVERA/ hepatic parenchymal disease) s/p cholecystectomy in 2017.   -  ERCP this AM.  - hepatic panel  -Toradol prn for pain and Zofran prn for nausea  -Daily CBC and CMP     Hypokalemia-  replete as needed  -Daily CMP    Anxiety/Depression- Stable  -Continue home Zoloft 50mg daily, once PO     Hypothyroidism  -Continue home Synthroid 112mcg daily, once PO     Obesity  - PT with BMI of Body mass index is 36.02 kg/(m^2). - Encouraging lifestyle modifications and further follow up outpatient.         FEN/GI - NPO. Lactated Ringer's   Activity - Ambulate as tolerated  DVT prophylaxis - SCDs  GI prophylaxis - Pepcid  Fall prophylaxis - Not indicated at this time. Disposition - Admit to Medical. Plan to d/c to Home. Consulting PT/OT  Code Status - Full    Patient will be discussed with Dr. Dawit Nichols. MD Kirsten  Family Medicine Resident           Subjective  No acute events overnight.    She currently denies abdominal pain, chills, chest pain, shortness of breath, palpitations, nausea and vomiting,     Inpatient Medications  Current Facility-Administered Medications   Medication Dose Route Frequency    famotidine (PF) (PEPCID) injection 20 mg  20 mg IntraVENous Q12H    sodium chloride (NS) flush 5-10 mL  5-10 mL IntraVENous Q8H    sodium chloride (NS) flush 5-10 mL  5-10 mL IntraVENous PRN    levothyroxine (SYNTHROID) tablet 112 mcg  112 mcg Oral ACB    sertraline (ZOLOFT) tablet 200 mg  200 mg Oral DAILY    lactated Ringers infusion  150 mL/hr IntraVENous CONTINUOUS    ketorolac (TORADOL) injection 15 mg  15 mg IntraVENous Q6H PRN    ondansetron (ZOFRAN) injection 4 mg  4 mg IntraVENous Q6H PRN    insulin lispro (HUMALOG) injection   SubCUTAneous Q6H    glucose chewable tablet 16 g  4 Tab Oral PRN    dextrose (D50W) injection syrg 12.5-25 g  12.5-25 g IntraVENous PRN    glucagon (GLUCAGEN) injection 1 mg  1 mg IntraMUSCular PRN         Allergies  Allergies   Allergen Reactions    Hydrocodone Shortness of Breath and Vertigo    Omeprazole Hives    Quibron [Theophylline-Guaifenesin] Other (comments)     Hallucinations and violent.  Tape [Adhesive] Rash         Objective  Vitals:  Patient Vitals for the past 8 hrs:   Temp Pulse Resp BP SpO2   02/22/18 0842 98 °F (36.7 °C) 78 16 135/82 97 %   02/22/18 0341 98 °F (36.7 °C) 70 16 134/79 96 %         I/O:    Intake/Output Summary (Last 24 hours) at 02/22/18 0930  Last data filed at 02/22/18 0034   Gross per 24 hour   Intake          4163.33 ml   Output                0 ml   Net          4163.33 ml     Last shift:       Last 3 shifts:    02/20 1901 - 02/22 0700  In: 1962 [P.O.:240; I.V.:4520]  Out: -     Physical Exam:  General: No acute distress. Alert. Cooperative. Head: Normocephalic. Atraumatic. Eyes:  Conjunctiva pink. Sclera white. PERRL. Nose:  Septum midline. Mucosa pink. No drainage. Throat: Mucosa pink. Moist mucous membranes. No tonsillar exudates or erythema. Palate movement equal bilaterally. Respiratory: CTAB. No w/r/r/c.   Cardiovascular: RRR. Normal S1,S2. No m/r/g. Pulses 2+ throughout. GI: + bowel sounds. Not tender No rebound tenderness or guarding. Nondistended   Extremities: No edema. No palpable cord. No tenderness.      Laboratory Data  Recent Results (from the past 12 hour(s))   GLUCOSE, POC    Collection Time: 02/22/18  1:00 AM   Result Value Ref Range    Glucose (POC) 70 65 - 100 mg/dL    Performed by Cain Payne    GLUCOSE, POC    Collection Time: 02/22/18  1:22 AM   Result Value Ref Range    Glucose (POC) 115 (H) 65 - 100 mg/dL    Performed by [facility]    METABOLIC PANEL, COMPREHENSIVE    Collection Time: 02/22/18  2:35 AM   Result Value Ref Range    Sodium 142 136 - 145 mmol/L    Potassium 3.6 3.5 - 5.1 mmol/L    Chloride 109 (H) 97 - 108 mmol/L    CO2 26 21 - 32 mmol/L    Anion gap 7 5 - 15 mmol/L    Glucose 84 65 - 100 mg/dL    BUN 9 6 - 20 MG/DL    Creatinine 0.87 0.55 - 1.02 MG/DL    BUN/Creatinine ratio 10 (L) 12 - 20      GFR est AA >60 >60 ml/min/1.73m2    GFR est non-AA >60 >60 ml/min/1.73m2    Calcium 8.4 (L) 8.5 - 10.1 MG/DL    Bilirubin, total 0.5 0.2 - 1.0 MG/DL    ALT (SGPT) 200 (H) 12 - 78 U/L    AST (SGOT) 70 (H) 15 - 37 U/L    Alk. phosphatase 367 (H) 45 - 117 U/L    Protein, total 6.5 6.4 - 8.2 g/dL    Albumin 3.0 (L) 3.5 - 5.0 g/dL    Globulin 3.5 2.0 - 4.0 g/dL    A-G Ratio 0.9 (L) 1.1 - 2.2     CBC WITH AUTOMATED DIFF    Collection Time: 02/22/18  2:35 AM   Result Value Ref Range    WBC 6.0 3.6 - 11.0 K/uL    RBC 4.18 3.80 - 5.20 M/uL    HGB 9.8 (L) 11.5 - 16.0 g/dL    HCT 32.5 (L) 35.0 - 47.0 %    MCV 77.8 (L) 80.0 - 99.0 FL    MCH 23.4 (L) 26.0 - 34.0 PG    MCHC 30.2 30.0 - 36.5 g/dL    RDW 19.6 (H) 11.5 - 14.5 %    PLATELET 177 995 - 854 K/uL    MPV 10.9 8.9 - 12.9 FL    NRBC 0.0 0  WBC    ABSOLUTE NRBC 0.00 0.00 - 0.01 K/uL    NEUTROPHILS 66 32 - 75 %    LYMPHOCYTES 22 12 - 49 %    MONOCYTES 9 5 - 13 %    EOSINOPHILS 3 0 - 7 %    BASOPHILS 0 0 - 1 %    IMMATURE GRANULOCYTES 0 0.0 - 0.5 %    ABS. NEUTROPHILS 3.9 1.8 - 8.0 K/UL    ABS. LYMPHOCYTES 1.3 0.8 - 3.5 K/UL    ABS. MONOCYTES 0.5 0.0 - 1.0 K/UL    ABS. EOSINOPHILS 0.2 0.0 - 0.4 K/UL    ABS. BASOPHILS 0.0 0.0 - 0.1 K/UL    ABS. IMM.  GRANS. 0.0 0.00 - 0.04 K/UL    DF AUTOMATED     GLUCOSE, POC    Collection Time: 02/22/18  6:00 AM   Result Value Ref Range    Glucose (POC) 68 65 - 100 mg/dL    Performed by DICK Rendon 80, POC Collection Time: 02/22/18  6:32 AM   Result Value Ref Range    Glucose (POC) 99 65 - 100 mg/dL    Performed by Nila Quintero (PCT)        Imaging  CT A/P WO CONT:  \"1. Suspected 2 mm calculus, nonobstructing, in distal left ureter. Nonobstructing calculus measuring 2 mm in the mid left kidney. 2. Status post cholecystectomy. 3. Splenomegaly again shown. Clinical correlation recommended. \"      Hospital Problems:  Hospital Problems  Date Reviewed: 11/21/2017          Codes Class Noted POA    * (Principal)Acute pancreatitis ICD-10-CM: K85.90  ICD-9-CM: 400.6  2/20/2018 Yes        Common bile duct obstruction ICD-10-CM: K83.1  ICD-9-CM: 576.2  2/20/2018 Yes        Anxiety ICD-10-CM: F41.9  ICD-9-CM: 300.00  7/25/2017 Yes        PCOS (polycystic ovarian syndrome) ICD-10-CM: E28.2  ICD-9-CM: 256.4  1/12/2017 Yes        Acquired hypothyroidism ICD-10-CM: E03.9  ICD-9-CM: 244.9  11/23/2016 Yes        Obesity (BMI 30-39. 9) ICD-10-CM: E66.9  ICD-9-CM: 278.00  10/6/2016 Yes

## 2018-02-22 NOTE — ROUTINE PROCESS
Fanta CAMI Kelly  1975  [id number]    Situation:  Verbal report received from: Prabha  Procedure: Procedure(s):  ENDOSCOPIC RETROGRADE CHOLANGIOPANCREATOGRAPHY (ERCP)    Background:    Preoperative diagnosis: abnormal MRCP  Postoperative diagnosis: * No post-op diagnosis entered *    :  Dr. Chucho Boyle  Assistant(s): Endoscopy Technician-1: Trey Gastelum  Endoscopy RN-1: Rehan Garay RN    Specimens: * No specimens in log *  H. Pylori  no    Assessment:      Anesthesia gave intra-procedure sedation and medications, see anesthesia flow sheet: yes    Intravenous fluids: NS@ KVO     Vital signs stable yes    Abdominal assessment: round and soft yes    Recommendation:  Discharge patient per MD order Yes.   Return to floor

## 2018-02-22 NOTE — PROGRESS NOTES
Problem: Pain  Goal: *Control of Pain  Outcome: Progressing Towards Goal  Pt pain controlled, goal achieved, no requests for pain meds at this time. Problem: Falls - Risk of  Goal: *Absence of Falls  Document Agatha Fall Risk and appropriate interventions in the flowsheet. Outcome: Progressing Towards Goal  Fall Risk Interventions:            Medication Interventions: Teach patient to arise slowly          Bed locked in lowest position call light in reach, pt verbalized understanding to call for assistance, fall prevention utilized. Pt glucose at 0000check, low, 70, treated with dextrose iv due to pt npo status, pt not symptomatic. Pending recheck    Recheck 115    Glucose low at 0600 check, dextrose iv admin per order, recheck 99.

## 2018-02-23 VITALS
HEIGHT: 67 IN | SYSTOLIC BLOOD PRESSURE: 127 MMHG | BODY MASS INDEX: 36.1 KG/M2 | DIASTOLIC BLOOD PRESSURE: 73 MMHG | TEMPERATURE: 98 F | RESPIRATION RATE: 15 BRPM | OXYGEN SATURATION: 96 % | HEART RATE: 55 BPM | WEIGHT: 230 LBS

## 2018-02-23 LAB
ALBUMIN SERPL-MCNC: 3.2 G/DL (ref 3.5–5)
ALBUMIN/GLOB SERPL: 0.9 {RATIO} (ref 1.1–2.2)
ALP SERPL-CCNC: 410 U/L (ref 45–117)
ALT SERPL-CCNC: 159 U/L (ref 12–78)
ANION GAP SERPL CALC-SCNC: 10 MMOL/L (ref 5–15)
AST SERPL-CCNC: 52 U/L (ref 15–37)
BASOPHILS # BLD: 0 K/UL (ref 0–0.1)
BASOPHILS NFR BLD: 0 % (ref 0–1)
BILIRUB SERPL-MCNC: 0.5 MG/DL (ref 0.2–1)
BUN SERPL-MCNC: 8 MG/DL (ref 6–20)
BUN/CREAT SERPL: 10 (ref 12–20)
CALCIUM SERPL-MCNC: 8.7 MG/DL (ref 8.5–10.1)
CHLORIDE SERPL-SCNC: 105 MMOL/L (ref 97–108)
CO2 SERPL-SCNC: 23 MMOL/L (ref 21–32)
CREAT SERPL-MCNC: 0.81 MG/DL (ref 0.55–1.02)
DIFFERENTIAL METHOD BLD: ABNORMAL
EOSINOPHIL # BLD: 0 K/UL (ref 0–0.4)
EOSINOPHIL NFR BLD: 0 % (ref 0–7)
ERYTHROCYTE [DISTWIDTH] IN BLOOD BY AUTOMATED COUNT: 18.7 % (ref 11.5–14.5)
GLOBULIN SER CALC-MCNC: 3.7 G/DL (ref 2–4)
GLUCOSE BLD STRIP.AUTO-MCNC: 105 MG/DL (ref 65–100)
GLUCOSE BLD STRIP.AUTO-MCNC: 154 MG/DL (ref 65–100)
GLUCOSE BLD STRIP.AUTO-MCNC: 85 MG/DL (ref 65–100)
GLUCOSE SERPL-MCNC: 86 MG/DL (ref 65–100)
HCT VFR BLD AUTO: 32.7 % (ref 35–47)
HGB BLD-MCNC: 10 G/DL (ref 11.5–16)
IMM GRANULOCYTES # BLD: 0 K/UL (ref 0–0.04)
IMM GRANULOCYTES NFR BLD AUTO: 0 % (ref 0–0.5)
LYMPHOCYTES # BLD: 0.7 K/UL (ref 0.8–3.5)
LYMPHOCYTES NFR BLD: 12 % (ref 12–49)
MCH RBC QN AUTO: 23.5 PG (ref 26–34)
MCHC RBC AUTO-ENTMCNC: 30.6 G/DL (ref 30–36.5)
MCV RBC AUTO: 76.9 FL (ref 80–99)
MONOCYTES # BLD: 0.4 K/UL (ref 0–1)
MONOCYTES NFR BLD: 7 % (ref 5–13)
NEUTS SEG # BLD: 4.8 K/UL (ref 1.8–8)
NEUTS SEG NFR BLD: 80 % (ref 32–75)
NRBC # BLD: 0 K/UL (ref 0–0.01)
NRBC BLD-RTO: 0 PER 100 WBC
PLATELET # BLD AUTO: 213 K/UL (ref 150–400)
PMV BLD AUTO: 11.2 FL (ref 8.9–12.9)
POTASSIUM SERPL-SCNC: 3.8 MMOL/L (ref 3.5–5.1)
PROT SERPL-MCNC: 6.9 G/DL (ref 6.4–8.2)
RBC # BLD AUTO: 4.25 M/UL (ref 3.8–5.2)
SERVICE CMNT-IMP: ABNORMAL
SERVICE CMNT-IMP: ABNORMAL
SERVICE CMNT-IMP: NORMAL
SODIUM SERPL-SCNC: 138 MMOL/L (ref 136–145)
WBC # BLD AUTO: 6 K/UL (ref 3.6–11)

## 2018-02-23 PROCEDURE — 80053 COMPREHEN METABOLIC PANEL: CPT

## 2018-02-23 PROCEDURE — 74011250636 HC RX REV CODE- 250/636: Performed by: STUDENT IN AN ORGANIZED HEALTH CARE EDUCATION/TRAINING PROGRAM

## 2018-02-23 PROCEDURE — 74011000250 HC RX REV CODE- 250: Performed by: FAMILY MEDICINE

## 2018-02-23 PROCEDURE — 36415 COLL VENOUS BLD VENIPUNCTURE: CPT

## 2018-02-23 PROCEDURE — 85025 COMPLETE CBC W/AUTO DIFF WBC: CPT

## 2018-02-23 PROCEDURE — 82962 GLUCOSE BLOOD TEST: CPT

## 2018-02-23 PROCEDURE — 74011250637 HC RX REV CODE- 250/637: Performed by: STUDENT IN AN ORGANIZED HEALTH CARE EDUCATION/TRAINING PROGRAM

## 2018-02-23 RX ADMIN — LEVOTHYROXINE SODIUM 112 MCG: 112 TABLET ORAL at 06:50

## 2018-02-23 RX ADMIN — KETOROLAC TROMETHAMINE 15 MG: 30 INJECTION, SOLUTION INTRAMUSCULAR at 09:35

## 2018-02-23 RX ADMIN — SERTRALINE HYDROCHLORIDE 200 MG: 50 TABLET ORAL at 09:24

## 2018-02-23 RX ADMIN — FAMOTIDINE 20 MG: 10 INJECTION, SOLUTION INTRAVENOUS at 09:35

## 2018-02-23 NOTE — DISCHARGE SUMMARY
[facility] [address] [place] [address]   Office [phone], Fax [phone]        Discharge Summary     Patient: Megha Swanson       MRN: [id number]       YOB: 1975       Age: 38 y.o. Date of admission:  2/20/2018    Date of discharge:  2/23/2018    Primary care provider:  Ariana Longoria MD     Admitting provider:  Johanna Lou MD    Discharging provider(s): Acosta Curtis MD - Family Medicine Resident  Jeniffer Whiting MD - Family Medicine Attending     Consultations  · GI    Procedures  · ERCP    Discharge destination: home. The patient is stable for discharge. Admission diagnosis  Common bile duct obstruction  Acute pancreatitis  Acute pancreatitis  Common bile duct obstruction  abnormal MRCP      Final discharge diagnoses and brief hospital course  As per admitting provider: \"    Megha Swanson is a 38 y.o. 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       Patient states that 3 days ago, she had some left-sided abdominal cramping that disappeared on its own, without medication. However, today, LUQ pain restarted and persisted all morning. Pt reports aggravation of pain with any movement, whether standing or laying down, which radiates toward her back. Patient took Advil at home, without relief of Sx. Pain worsened so much so that she decided to come to the ED to be evaluated. Patient endorses mild nausea associated with pain prior to admission, as well as chills and subjective fever. However, she denies H/A, dizziness, CP, palpitations, vomiting, hematuria, hematochezia, diarrhea/constipation or dysuria. Last BM reported this morning of normal consistency.  Patient denies ever being diagnosed with pancreatitis in the past. She had a cholecystectomy in April 2017 due to cholecystitis.      In the ER:  -Vital signs: Temp 97.4, HR 65, RR 18, /91, SpO2 100% RA  -Labs were remarkable for Hg 11 (BL~ 12.5-13.5), K 3.4, , , , lipase >3000.  -Imaging: CT A/P showed \" Suspected 2 mm calculus, nonobstructing, in distal left ureter. Nonobstructing calculus measuring 2 mm in the mid left kidney. Splenomegaly again shown. \"  -Treatment: Pt received Zofran 4mg x 1, Toradol 15mg x 1. Conditions treated during this hospitalization:    Sunday Vance is a 38 y.o. female  Pmhx of PCOS, anxiety, depression, and hypothyroidism who is admitted for suspected common bile duct obstruction and pancreatitis.     Acute Pancreatitis- POA lipase >3000 with abdominal pain. MRCP shows some 3mm filling defect. Pancreas normal. ERCP: 2 small stones 6- 8mm in CBD and CHD and sludge were removed. Patient was tolerating diet on discharge. No pain. Cosme tissue sample was sent. - f/u with PCP and GI  - f/u [facility]        Elevated LFTs- possible bile duct obstruction vs hepatic origin( RIVERA/ hepatic parenchymal disease) s/p cholecystectomy in 2017. - Acute hepatitis panel pending      Hypokalemia-  repleted      Anxiety/Depression- Stable  -Continue home Zoloft 50mg daily, once PO      Hypothyroidism  -Continue home Synthroid 112mcg daily, once PO      Obesity  - PT with BMI of Body mass index is 36.02 kg/(m^2). - Encouraging lifestyle modifications and further follow up outpatient.             Labs/Imaging Needed on follow up: CBC, CMP    Pending test results: At the time of your discharge the following test results are still pending: Acute hepatitis panel, [facility]. Please make sure you review these results with your outpatient follow-up provider(s). Specific symptoms to watch for: chest pain, shortness of breath, fever, chills, nausea, vomiting, diarrhea, change in mentation, falling, weakness, bleeding.      DIET/what to eat:  Low fat, Low cholesterol    ACTIVITY:  Activity as tolerated      Physical examination at discharge  Visit Vitals    /73 (BP 1 Location: Right arm, BP Patient Position: At rest)    Pulse (!) 55    Temp 98 °F (36.7 °C)    Resp 15    Ht 5' 7\" (1.702 m)    Wt 230 lb (104.3 kg)    SpO2 96%    BMI 36.02 kg/m2      Physical Examination:   General appearance - alert, well appearing, and in no distress   Mental status - normal mood, behavior, speech, dress, motor activity, and thought processes  Chest - clear to auscultation, no wheezes, rales or rhonchi, symmetric air entry  Heart - normal rate, regular rhythm, normal S1, S2, no murmurs, rubs, clicks or gallops  Abdomen - soft, nontender, nondistended, no masses or organomegaly  Neurological - alert, oriented, normal speech, no focal findings or movement disorder noted  Extremities - peripheral pulses normal, no pedal edema, no clubbing or cyanosis    Recent Results (from the past 24 hour(s))   GLUCOSE, POC    Collection Time: 02/22/18 11:54 AM   Result Value Ref Range    Glucose (POC) 68 65 - 100 mg/dL    Performed by Lorna Caro (PCT)    GLUCOSE, POC    Collection Time: 02/22/18  1:03 PM   Result Value Ref Range    Glucose (POC) 117 (H) 65 - 100 mg/dL    Performed by Lucho Weston (PCT)    GLUCOSE, POC    Collection Time: 02/22/18  3:41 PM   Result Value Ref Range    Glucose (POC) 70 65 - 100 mg/dL    Performed by [address] [place] [address], POC    Collection Time: 02/22/18  4:11 PM   Result Value Ref Range    Glucose (POC) 114 (H) 65 - 100 mg/dL    Performed by [address] [place] [address], POC    Collection Time: 02/23/18 12:25 AM   Result Value Ref Range    Glucose (POC) 154 (H) 65 - 100 mg/dL    [facility]    METABOLIC PANEL, COMPREHENSIVE    Collection Time: 02/23/18  6:00 AM   Result Value Ref Range    Sodium 138 136 - 145 mmol/L    Potassium 3.8 3.5 - 5.1 mmol/L    Chloride 105 97 - 108 mmol/L    CO2 23 21 - 32 mmol/L    Anion gap 10 5 - 15 mmol/L    Glucose 86 65 - 100 mg/dL    BUN 8 6 - 20 MG/DL    Creatinine 0.81 0.55 - 1.02 MG/DL    BUN/Creatinine ratio 10 (L) 12 - 20      GFR est AA >60 >60 ml/min/1.73m2    GFR est non-AA >60 >60 ml/min/1.73m2    Calcium 8.7 8.5 - 10.1 MG/DL    Bilirubin, total 0.5 0.2 - 1.0 MG/DL    ALT (SGPT) 159 (H) 12 - 78 U/L    AST (SGOT) 52 (H) 15 - 37 U/L    Alk. phosphatase 410 (H) 45 - 117 U/L    Protein, total 6.9 6.4 - 8.2 g/dL    Albumin 3.2 (L) 3.5 - 5.0 g/dL    Globulin 3.7 2.0 - 4.0 g/dL    A-G Ratio 0.9 (L) 1.1 - 2.2     CBC WITH AUTOMATED DIFF    Collection Time: 02/23/18  6:00 AM   Result Value Ref Range    WBC 6.0 3.6 - 11.0 K/uL    RBC 4.25 3.80 - 5.20 M/uL    HGB 10.0 (L) 11.5 - 16.0 g/dL    HCT 32.7 (L) 35.0 - 47.0 %    MCV 76.9 (L) 80.0 - 99.0 FL    MCH 23.5 (L) 26.0 - 34.0 PG    MCHC 30.6 30.0 - 36.5 g/dL    RDW 18.7 (H) 11.5 - 14.5 %    PLATELET 667 387 - 272 K/uL    MPV 11.2 8.9 - 12.9 FL    NRBC 0.0 0  WBC    ABSOLUTE NRBC 0.00 0.00 - 0.01 K/uL    NEUTROPHILS 80 (H) 32 - 75 %    LYMPHOCYTES 12 12 - 49 %    MONOCYTES 7 5 - 13 %    EOSINOPHILS 0 0 - 7 %    BASOPHILS 0 0 - 1 %    IMMATURE GRANULOCYTES 0 0.0 - 0.5 %    ABS. NEUTROPHILS 4.8 1.8 - 8.0 K/UL    ABS. LYMPHOCYTES 0.7 (L) 0.8 - 3.5 K/UL    ABS. MONOCYTES 0.4 0.0 - 1.0 K/UL    ABS. EOSINOPHILS 0.0 0.0 - 0.4 K/UL    ABS. BASOPHILS 0.0 0.0 - 0.1 K/UL    ABS. IMM. GRANS. 0.0 0.00 - 0.04 K/UL    DF AUTOMATED     GLUCOSE, POC    Collection Time: 02/23/18  6:39 AM   Result Value Ref Range    Glucose (POC) 85 65 - 100 mg/dL    Performed by Dedra Zaragoza          Current Discharge Medication List      CONTINUE these medications which have NOT CHANGED    Details   sertraline (ZOLOFT) 50 mg tablet Take 200 mg by mouth daily. levothyroxine (SYNTHROID) 112 mcg tablet Take 1 Tab by mouth Daily (before breakfast).  Lower dose  Qty: 30 Tab, Refills: 11    Associated Diagnoses: Acquired hypothyroidism             Admission imaging studies:      Results from [place] encounter on 02/20/18   XR ERCP / ERCB COMBINED   Narrative Compliance only    Spot fluoroscopic views were obtained during ERCP. Impression IMPRESSION: See above. Results from [place] encounter on 05/01/17   US ABD LTD   Narrative CLINICAL HISTORY: Right upper quadrant pain, question of biliary pathology    INDICATION: Abnormal LFTs      FINDINGS:   Limited right upper quadrant images of the abdomen were obtained using a curved  array transducer. The right kidney measures 10.3 cm is normal in echogenicity with normal  corticomedullary differentiation. There is no hydronephrosis. The gallbladder is  surgically absent. The common bile duct measures 0.6cm. Liver is mildly  increased in echogenicity. Portal venous flow within normal limits. The  visualized portions of the pancreas are unremarkable. Impression IMPRESSION:   Status post cholecystectomy. Otherwise unremarkable abdominal ultrasound. Results from [place] encounter on 02/20/18   CT ABD PELV WO CONT   Narrative EXAM:  CT ABD PELV WO CONT    INDICATION: Left upper quadrant and left flank pain, back pain since 1400 hours  today. COMPARISON: CT 5.17    CONTRAST:  None. TECHNIQUE:   Thin axial images were obtained through the abdomen and pelvis. Coronal and  sagittal reconstructions were generated. Oral contrast was not administered. CT  dose reduction was achieved through use of a standardized protocol tailored for  this examination and automatic exposure control for dose modulation. The absence of intravenous and oral contrast material reduces the capacity of CT  to evaluate the solid parenchymal organs of the abdomen as well as the bowel. FINDINGS:   LUNG BASES: Clear. INCIDENTALLY IMAGED HEART AND MEDIASTINUM: Unremarkable. LIVER: No mass or biliary dilatation. GALLBLADDER: Status post cholecystectomy. SPLEEN: Mild splenomegaly again shown with length of 13.5 cm. PANCREAS: No mass or ductal dilatation.   ADRENALS: Unremarkable. KIDNEYS/URETERS: 2 mm nonobstructing calculus mid pole of left kidney. No renal  pelvocaliectasis or ureteral dilation. There is a 2 mm calcification  demonstrated proximal to the level of the left ureterovesical junction which is  suspicious for a distal ureteral calculus. STOMACH: Unremarkable. SMALL BOWEL: No dilatation or wall thickening. COLON: Unremarkable. Unchanged tiny calcification posterior to right cecal apex. APPENDIX: Not demonstrated. PERITONEUM: No ascites or pneumoperitoneum. RETROPERITONEUM: No lymphadenopathy or aortic aneurysm. REPRODUCTIVE ORGANS: Unremarkable. URINARY BLADDER: No mass or calculus. BONES: Unremarkable. ADDITIONAL COMMENTS: N/A         Impression IMPRESSION:    1. Suspected 2 mm calculus, nonobstructing, in distal left ureter. Nonobstructing calculus measuring 2 mm in the mid left kidney. 2. Status post cholecystectomy. 3. Splenomegaly again shown. Clinical correlation recommended. No procedure found.      -------------------------------------------------------------------------------------------------------------------    Chronic Diagnoses:        Home or Self Care    Signed:      Jacky Yu MD   Family Medicine Resident      2/23/2018

## 2018-02-23 NOTE — PROGRESS NOTES
[address] NP  [phone]           GI PROGRESS NOTE      NAME: Emmett Daniel   :  1975   MRN:  [id number]       Subjective:   \"I am ready to go home. \"  Pt states only pain is in throat from endoscopy. Able to tolerate muffin and cottage cheese for breakfast.  Denies nausea, vomiting, and diarrhea. Objective:     VITALS:   Last 24hrs VS reviewed since prior progress note. Most recent are:  Visit Vitals    /73 (BP 1 Location: Right arm, BP Patient Position: At rest)    Pulse (!) 55    Temp 98 °F (36.7 °C)    Resp 15    Ht 5' 7\" (1.702 m)    Wt 104.3 kg (230 lb)    SpO2 96%    BMI 36.02 kg/m2       Intake/Output Summary (Last 24 hours) at 18 1055  Last data filed at 18 0208   Gross per 24 hour   Intake          5801.67 ml   Output                0 ml   Net          5801.67 ml     PHYSICAL EXAM:  General: Alert, in no acute distress    HEENT: Anicteric sclerae. Lungs:  CTA Bilaterally. Heart:  Regular  rhythm,    Abdomen: Soft, Non distended, Non tender.  (+)Bowel sounds, no HSM  Extremities: No c/c/e  Neurologic:  CN 2-12 gi, Alert and oriented X 3. No acute neurological distress   Psych:   Good insight. Not anxious nor agitated.     Lab Data Reviewed:   Recent Labs      18   0618   0235   WBC  6.0  6.0   HGB  10.0*  9.8*   HCT  32.7*  32.5*   PLT  213  188     Recent Labs      18   0600  18   0235   NA  138  142   K  3.8  3.6   CL  105  109*   CO2  23  26   BUN  8  9   CREA  0.81  0.87   GLU  86  84   CA  8.7  8.4*     Recent Labs      18   0600  18   0235  18   0530  18   1905   SGOT  52*  70*  155*  191*   AP  410*  367*  421*  486*   TP  6.9  6.5  6.7  8.0   ALB  3.2*  3.0*  3.1*  3.8   GLOB  3.7  3.5  3.6  4.2*   LPSE   --    --   >3000*  >3000*       ________________________________________________________________________  Patient Active Problem List   Diagnosis Code    Anovulatory N97.0    HPV test positive [id number]    Abnormal Pap smear of cervix R87.619    Cervical dysplasia N87.9    Obesity (BMI 30-39. 9) E66.9    Change in multiple nevi D22.9    Family history of thyroid disease in mother Z80.51    Acquired hypothyroidism E03.9    PCOS (polycystic ovarian syndrome) E28.2    Anxiety F41.9    Endometrial hyperplasia N85.00    Acute pancreatitis K85.90    Common bile duct obstruction K83.1        Assessment:   Pancreatitis  - Underwent ERCP which removed 2 stones yesterday. - AST 52, , Alk Phos, 410 - Likely attributable to fatty liver disease.  - Tolerating low fat diet.  - Ok to dischare home from GI perspective. - Continue low fat diet at home.  - Recommend follow up in our Clinic within 2 weeks of discharge.      Signed By: Michael Lantigua NP     2/23/2018  10:55 AM

## 2018-02-23 NOTE — PROGRESS NOTES
Problem: Falls - Risk of  Goal: *Absence of Falls  Document Agatha Fall Risk and appropriate interventions in the flowsheet.    Outcome: Progressing Towards Goal  Fall Risk Interventions:  Mobility Interventions: Bed/chair exit alarm, Communicate number of staff needed for ambulation/transfer, Patient to call before getting OOB, PT Consult for mobility concerns, Strengthening exercises (ROM-active/passive), Utilize gait belt for transfers/ambulation, Utilize walker, cane, or other assitive device         Medication Interventions: Teach patient to arise slowly         History of Falls Interventions: Bed/chair exit alarm, Investigate reason for fall, Evaluate medications/consider consulting pharmacy, Consult care management for discharge planning      Bed locked in lowest position call light in reach pt verbalize understanding to call for assistance,

## 2018-02-23 NOTE — PROGRESS NOTES
Went over discharge paperwork and medication information with patient who verbalized understanding of instructions. Copy of discharge given to patient. Educated patient on medications and gave medication information to include potential side effects on new scripts given. Removed patients peripheral IV per protocol. Cath tip in place. Patient has no complaints or questions. Patient discharged to ride home with her parents via wheelchair with the volunteer services.

## 2018-02-23 NOTE — PROGRESS NOTES
Problem: Falls - Risk of  Goal: *Absence of Falls  Document Agatha Fall Risk and appropriate interventions in the flowsheet.    Outcome: Progressing Towards Goal  Fall Risk Interventions:  Mobility Interventions: Bed/chair exit alarm, Communicate number of staff needed for ambulation/transfer, Patient to call before getting OOB, PT Consult for mobility concerns, Strengthening exercises (ROM-active/passive), Utilize gait belt for transfers/ambulation, Utilize walker, cane, or other assitive device         Medication Interventions: Teach patient to arise slowly         History of Falls Interventions: Bed/chair exit alarm, Investigate reason for fall, Evaluate medications/consider consulting pharmacy, Consult care management for discharge planning

## 2018-02-23 NOTE — DISCHARGE INSTRUCTIONS
Rachid Hadley 97 / [id number] : 1975    Admission date: 2018 Discharge date: 2018 3:14 PM     Please bring this form with you to show your care provider at your follow-up appointment. Primary care provider:  Minor Cullen MD    Discharging provider:  Paolo Armendariz. MD Kirsten  - Family Medicine Resident   Yessica Ferris MD - Family Medicine Attending      You have been admitted to the hospital with the following diagnoses:    ACUTE DIAGNOSES:  · Common bile duct obstruction  · Acute pancreatitis  · Acute pancreatitis  · Common bile duct obstruction  . . . . . . . . . . . . . . . . . . . . . . . . . . . . . . . . . . . . . . . . . . . . . . . . . . . . . . . . . . . . . . . . . . . . . . . .         Medication changes: Please review your After- Visit- Summary for full details on your medications. FOLLOW-UP CARE RECOMMENDATIONS:    Appointments  Follow-up Information     Follow up With Details Comments Damien Rodriguez MD Schedule an appointment as soon as possible for a visit in 2 days for follow up [facility] [address]  [phone]               Follow-up tests needed: cbc, cmp, per provider    Pending test results: At the time of your discharge the following test results are still pending: acute hepatitis panel, H pilori. Please make sure you review these results with your outpatient follow-up provider(s). Specific symptoms to watch for: chest pain, shortness of breath, fever, chills, nausea, vomiting, diarrhea, change in mentation, falling, weakness, bleeding. DIET/what to eat:  Low fat, Low cholesterol    ACTIVITY:  Activity as tolerated        What to do if new or unexpected symptoms occur? If you experience any of the above symptoms (or should other concerns or questions arise after discharge) please call your primary care physician.  Return to the emergency room if you cannot get hold of your doctor. · It is very important that you keep your follow-up appointment(s). · Please bring discharge papers, medication list (and/or medication bottles) to your follow-up appointments for review by your outpatient provider(s). · Please check the list of medications and be sure it includes every medication (even non-prescription medications) that your provider wants you to take. · It is important that you take the medication exactly as they are prescribed. · Keep your medication in the bottles provided by the pharmacist and keep a list of the medication names, dosages, and times to be taken in your wallet. · Do not take other medications without consulting your doctor. · If you have any questions about your medications or other instructions, please talk to your nurse or care provider before you leave the hospital.     Information obtained by:     I understand that if any problems occur once I am at home I am to contact my physician. These instructions were explained to me and I had the opportunity to ask questions. I understand and acknowledge receipt of the instructions indicated above.                                                                                                                                                Physician's or R.N.'s Signature                                                                  Date/Time                                                                                                                                              Patient or Representative Signature                                                          Date/Time

## 2018-02-26 ENCOUNTER — OFFICE VISIT (OUTPATIENT)
Dept: FAMILY MEDICINE CLINIC | Age: 43
End: 2018-02-26

## 2018-02-26 VITALS
BODY MASS INDEX: 35.31 KG/M2 | DIASTOLIC BLOOD PRESSURE: 77 MMHG | OXYGEN SATURATION: 100 % | WEIGHT: 225 LBS | SYSTOLIC BLOOD PRESSURE: 116 MMHG | RESPIRATION RATE: 16 BRPM | HEIGHT: 67 IN | HEART RATE: 79 BPM | TEMPERATURE: 98.3 F

## 2018-02-26 DIAGNOSIS — K85.10 ACUTE BILIARY PANCREATITIS WITHOUT INFECTION OR NECROSIS: Primary | ICD-10-CM

## 2018-02-26 NOTE — PROGRESS NOTES
Pio Mitchell is an 38 y.o. female who presents for follow up    Recently hospitalized for pancreatitis. D/c on Friday. Feels much better. No n/v/f/c. Tolerating PO okay. Pain controlled. Allergies - reviewed: Allergies   Allergen Reactions    Hydrocodone Shortness of Breath and Vertigo    Omeprazole Hives    Quibron [Theophylline-Guaifenesin] Other (comments)     Hallucinations and violent.  Tape [Adhesive] Rash         Medications - reviewed:   Current Outpatient Prescriptions   Medication Sig    sertraline (ZOLOFT) 50 mg tablet Take 200 mg by mouth daily.  levothyroxine (SYNTHROID) 112 mcg tablet Take 1 Tab by mouth Daily (before breakfast). Lower dose     No current facility-administered medications for this visit. Past Medical History - reviewed:  Past Medical History:   Diagnosis Date    Abnormal Pap smear     colposcopy, wnl since    Asthma     stress induced(last in january)    Group B Streptococcus carrier, +RV culture, currently pregnant 16    Infertility     IUI to get pregnant    Polycystic ovarian syndrome     Thyroid disease     Trauma     broken R ankle, R foot, L arm         Past Surgical History - reviewed:   Past Surgical History:   Procedure Laterality Date     DELIVERY ONLY  12    CKC, AKA COLD KNIFE CONE      HX CHOLECYSTECTOMY      HX COLPOSCOPY  3/17/16    HX GYN      Csection X2    HX HEENT      DENTAL         Social History - reviewed:  Social History     Social History    Marital status:      Spouse name: N/A    Number of children: N/A    Years of education: N/A     Occupational History    Not on file.      Social History Main Topics    Smoking status: Never Smoker    Smokeless tobacco: Never Used    Alcohol use No    Drug use: No    Sexual activity: Not Currently     Partners: Male     Birth control/ protection: None     Other Topics Concern    Not on file     Social History Narrative Family History - reviewed:  Family History   Problem Relation Age of Onset    Thyroid Disease Mother      also in maternal aunt    Cancer Maternal Grandmother     Cancer Paternal Grandmother      brain tumor    Anesth Problems Neg Hx          Immunizations - reviewed:   Immunization History   Administered Date(s) Administered    Influenza Vaccine (Quad) PF 09/30/2015    TDAP Vaccine 11/01/2012    Tdap 12/02/2015       ROS  CONSTITUTIONAL: Denies: fever, chills  GI: Denies: abdominal pain, nausea, vomiting, diarrhea      Physical Exam  Visit Vitals    /77 (BP 1 Location: Left arm, BP Patient Position: Sitting)    Pulse 79    Temp 98.3 °F (36.8 °C) (Oral)    Resp 16    Ht 5' 7\" (1.702 m)    Wt 225 lb (102.1 kg)    LMP 02/19/2018 (Exact Date)    SpO2 100%    BMI 35.24 kg/m2       General appearance - alert, well appearing, and in no distress  Chest - clear to auscultation, no wheezes, rales or rhonchi, symmetric air entry  Heart - normal rate, regular rhythm, normal S1, S2, no murmurs, rubs, clicks or gallops  Abdomen - soft, nontender, nondistended, no masses or organomegaly  no rebound tenderness noted      Assessment/Plan    ICD-10-CM ICD-9-CM    1. Acute biliary pancreatitis without infection or necrosis K85.10 577.0 CBC W/O DIFF      METABOLIC PANEL, COMPREHENSIVE      REFERRAL TO GASTROENTEROLOGY     · Records reviewed from recent hospitalization  · Will repeat CBC and CMP  · Pt to schedule f/u with GI    Follow-up Disposition: Not on File      I have discussed the diagnosis with the patient and the intended plan as seen in the above orders. The patient has received an after-visit summary and questions were answered concerning future plans. I have discussed medication side effects and warnings with the patient as well.       Christi De La Rosa, DO

## 2018-02-26 NOTE — MR AVS SNAPSHOT
[address] 
[phone] Patient: Megha Swanson MRN: [id number] :1975 Visit Information Date & Time Provider Department Dept. Phone Encounter #  
 2018  3:00 PM Cheri Ahn DO  [address] [facility] [phone] [id number] Upcoming Health Maintenance Date Due Influenza Age 5 to Adult 2017 PAP AKA CERVICAL CYTOLOGY 2019 DTaP/Tdap/Td series (3 - Td) 2025 Allergies as of 2018  Review Complete On: 2018 By: Cheri Ahn DO Severity Noted Reaction Type Reactions Hydrocodone  2011    Shortness of Breath, Vertigo Omeprazole  2014    Hives [place] [Theophylline-guaifenesin]  2010    Other (comments) Hallucinations and violent. Tape [Adhesive] Low 2016   Topical Rash Current Immunizations  Reviewed on 2018 Name Date Influenza Vaccine (Quad) PF 2015 TDAP Vaccine 2012 Tdap 2015 10:23 AM  
  
 Not reviewed this visit You Were Diagnosed With   
  
 Codes Comments Acute biliary pancreatitis without infection or necrosis    -  Primary ICD-10-CM: K85.10 ICD-9-CM: 055.7 Vitals BP Pulse Temp Resp Height(growth percentile) Weight(growth percentile) 116/77 (BP 1 Location: Left arm, BP Patient Position: Sitting) 79 98.3 °F (36.8 °C) (Oral) 16 5' 7\" (1.702 m) 225 lb (102.1 kg) LMP SpO2 BMI OB Status Smoking Status 2018 (Exact Date) 100% 35.24 kg/m2 Having regular periods Never Smoker Vitals History BMI and BSA Data Body Mass Index Body Surface Area  
 35.24 kg/m 2 2.2 m 2 Preferred Pharmacy Pharmacy Name Phone [id number], [address] [phone] Your Updated Medication List  
  
   
This list is accurate as of 18  3:52 PM.  Always use your most recent med list.  
  
  
  
  
 levothyroxine 112 mcg tablet Commonly known as:  SYNTHROID Take 1 Tab by mouth Daily (before breakfast). Lower dose  
  
 sertraline 50 mg tablet Commonly known as:  ZOLOFT Take 200 mg by mouth daily. We Performed the Following CBC W/O DIFF [58222 CPT(R)] METABOLIC PANEL, COMPREHENSIVE [33636 CPT(R)] REFERRAL TO GASTROENTEROLOGY [[id number] Custom] Comments:  
 Pancreatitis, elevated LFTs, hospital follow up Referral Information Referral ID Referred By Referred To  
  
 [id number] DUNCAN, [address] [facility] MD Beau   
   [id number] [facility] [address] [place] [address] Phone: [phone] Fax: [phone] Visits Status Start Date End Date 1 New Request 2/26/18 2/26/19 If your referral has a status of pending review or denied, additional information will be sent to support the outcome of this decision. Patient Instructions Pancreatitis: Care Instructions Your Care Instructions The pancreas is an organ behind the stomach. It makes hormones and enzymes to help your body digest food. But if these enzymes attack the pancreas, it can get inflamed. This is called pancreatitis. Most cases are caused by gallstones or by heavy alcohol use. If you take care of yourself at home, it will help you get better. It will also help you avoid more problems with your pancreas. Follow-up care is a key part of your treatment and safety. Be sure to make and go to all appointments, and call your doctor if you are having problems. It's also a good idea to know your test results and keep a list of the medicines you take. How can you care for yourself at home? · Drink clear liquids and eat bland foods until you feel better. [place] foods include rice, dry toast, and crackers. They also include bananas and applesauce. · Eat a low-fat diet until your doctor says your pancreas is healed. · Do not drink alcohol. Tell your doctor if you need help to quit. Counseling, support groups, and sometimes medicines can help you stay sober. · Be safe with medicines. Read and follow all instructions on the label. ¨ If the doctor gave you a prescription medicine for pain, take it as prescribed. ¨ If you are not taking a prescription pain medicine, ask your doctor if you can take an over-the-counter medicine. · If your doctor prescribed antibiotics, take them as directed. Do not stop taking them just because you feel better. You need to take the full course of antibiotics. · Get extra rest until you feel better. To prevent future problems with your pancreas · Do not drink alcohol. · Tell your doctors and pharmacist that you've had pancreatitis. They can help you avoid medicines that may cause this problem again. When should you call for help? Call 911 anytime you think you may need emergency care. For example, call if: 
? · You vomit blood or what looks like coffee grounds. ? · Your stools are maroon or very bloody. ?Call your doctor now or seek immediate medical care if: 
? · You have new or worse belly pain. ? · Your stools are black and look like tar, or they have streaks of blood. ? · You are vomiting. ? Watch closely for changes in your health, and be sure to contact your doctor if: 
? · You do not get better as expected. Where can you learn more? Go to http://maury-manny.info/. Enter L516 in the search box to learn more about \"Pancreatitis: Care Instructions. \" Current as of: May 12, 2017 Content Version: 11.4 © 0604-9797 Healthwise, Incorporated. Care instructions adapted under license by [employer] (which disclaims liability or warranty for this information).  If you have questions about a medical condition or this instruction, always ask your healthcare professional. [address] any warranty or liability for your use of this information. Introducing [facility]! Dear Staci Welch: 
Thank you for requesting a [employer] account. Our records indicate that you have previously registered for a [employer] account but its currently inactive. Please call our [employer] support line at [phone]. Additional Information If you have questions, please visit the Frequently Asked Questions section of the [employer] website at [email or web address] Remember, [employer] is NOT to be used for urgent needs. For medical emergencies, dial 911. Now available from your iPhone and Android! Please provide this summary of care documentation to your next provider. Your primary care clinician is listed as [address] [place] [address]. If you have any questions after today's visit, please call [phone].

## 2018-02-26 NOTE — PATIENT INSTRUCTIONS
Pancreatitis: Care Instructions  Your Care Instructions    The pancreas is an organ behind the stomach. It makes hormones and enzymes to help your body digest food. But if these enzymes attack the pancreas, it can get inflamed. This is called pancreatitis. Most cases are caused by gallstones or by heavy alcohol use. If you take care of yourself at home, it will help you get better. It will also help you avoid more problems with your pancreas. Follow-up care is a key part of your treatment and safety. Be sure to make and go to all appointments, and call your doctor if you are having problems. It's also a good idea to know your test results and keep a list of the medicines you take. How can you care for yourself at home? · Drink clear liquids and eat bland foods until you feel better. [place] foods include rice, dry toast, and crackers. They also include bananas and applesauce. · Eat a low-fat diet until your doctor says your pancreas is healed. · Do not drink alcohol. Tell your doctor if you need help to quit. Counseling, support groups, and sometimes medicines can help you stay sober. · Be safe with medicines. Read and follow all instructions on the label. ¨ If the doctor gave you a prescription medicine for pain, take it as prescribed. ¨ If you are not taking a prescription pain medicine, ask your doctor if you can take an over-the-counter medicine. · If your doctor prescribed antibiotics, take them as directed. Do not stop taking them just because you feel better. You need to take the full course of antibiotics. · Get extra rest until you feel better. To prevent future problems with your pancreas  · Do not drink alcohol. · Tell your doctors and pharmacist that you've had pancreatitis. They can help you avoid medicines that may cause this problem again. When should you call for help? Call 911 anytime you think you may need emergency care.  For example, call if:  ? · You vomit blood or what looks like coffee grounds. ? · Your stools are maroon or very bloody. ?Call your doctor now or seek immediate medical care if:  ? · You have new or worse belly pain. ? · Your stools are black and look like tar, or they have streaks of blood. ? · You are vomiting. ? Watch closely for changes in your health, and be sure to contact your doctor if:  ? · You do not get better as expected. Where can you learn more? Go to http://maury-manny.info/. Enter Q551 in the search box to learn more about \"Pancreatitis: Care Instructions. \"  Current as of: May 12, 2017  Content Version: 11.4  © 6599-0405 [employer]. Care instructions adapted under license by [employer] (which disclaims liability or warranty for this information). If you have questions about a medical condition or this instruction, always ask your healthcare professional. [address] any warranty or liability for your use of this information.

## 2018-02-27 LAB
ALBUMIN SERPL-MCNC: 4.2 G/DL (ref 3.5–5.5)
ALBUMIN/GLOB SERPL: 1.4 {RATIO} (ref 1.2–2.2)
ALP SERPL-CCNC: 279 IU/L (ref 39–117)
ALT SERPL-CCNC: 62 IU/L (ref 0–32)
AST SERPL-CCNC: 23 IU/L (ref 0–40)
BILIRUB SERPL-MCNC: 0.4 MG/DL (ref 0–1.2)
BUN SERPL-MCNC: 6 MG/DL (ref 6–24)
BUN/CREAT SERPL: 7 (ref 9–23)
CALCIUM SERPL-MCNC: 9.2 MG/DL (ref 8.7–10.2)
CHLORIDE SERPL-SCNC: 103 MMOL/L (ref 96–106)
CO2 SERPL-SCNC: 21 MMOL/L (ref 18–29)
CREAT SERPL-MCNC: 0.84 MG/DL (ref 0.57–1)
ERYTHROCYTE [DISTWIDTH] IN BLOOD BY AUTOMATED COUNT: 19.5 % (ref 12.3–15.4)
GFR SERPLBLD CREATININE-BSD FMLA CKD-EPI: 101 ML/MIN/1.73
GFR SERPLBLD CREATININE-BSD FMLA CKD-EPI: 87 ML/MIN/1.73
GLOBULIN SER CALC-MCNC: 3 G/DL (ref 1.5–4.5)
GLUCOSE SERPL-MCNC: 82 MG/DL (ref 65–99)
HCT VFR BLD AUTO: 36.5 % (ref 34–46.6)
HGB BLD-MCNC: 11.3 G/DL (ref 11.1–15.9)
MCH RBC QN AUTO: 22.9 PG (ref 26.6–33)
MCHC RBC AUTO-ENTMCNC: 31 G/DL (ref 31.5–35.7)
MCV RBC AUTO: 74 FL (ref 79–97)
PLATELET # BLD AUTO: 261 X10E3/UL (ref 150–379)
POTASSIUM SERPL-SCNC: 4.3 MMOL/L (ref 3.5–5.2)
PROT SERPL-MCNC: 7.2 G/DL (ref 6–8.5)
RBC # BLD AUTO: 4.94 X10E6/UL (ref 3.77–5.28)
SODIUM SERPL-SCNC: 139 MMOL/L (ref 134–144)
WBC # BLD AUTO: 6 X10E3/UL (ref 3.4–10.8)

## 2018-03-02 RX ORDER — SERTRALINE HYDROCHLORIDE 50 MG/1
200 TABLET, FILM COATED ORAL DAILY
Qty: 120 TAB | Refills: 6 | Status: SHIPPED | OUTPATIENT
Start: 2018-03-02 | End: 2018-11-10 | Stop reason: SDUPTHER

## 2018-05-23 ENCOUNTER — TELEPHONE (OUTPATIENT)
Dept: FAMILY MEDICINE CLINIC | Age: 43
End: 2018-05-23

## 2018-08-16 ENCOUNTER — TELEPHONE (OUTPATIENT)
Dept: FAMILY MEDICINE CLINIC | Age: 43
End: 2018-08-16

## 2018-08-16 NOTE — TELEPHONE ENCOUNTER
I called patient and apparently she was having some heavy periods about 1 1/2 years ago, Dr Malvin Ramirez did a uterine biopsy and looked like there were some abnormal cells. She did a repeat 6 months later and results at that point were normal. Dr Arline Mosley wanted her to follow up in 6 months again for another uterine biopsy. I wanted to schedule the appt but wanted to relay to you first. Also she needs a pap smear as well. How long of an appt will she need? Let me know how to proceed and will call her back to schedule appt.

## 2018-08-16 NOTE — TELEPHONE ENCOUNTER
----- Message from Herve Velazquez sent at 8/15/2018  4:35 PM EDT -----  Regarding: Dr. Lilli Linda / Telephone   Pt would like to schedule an appt to do a biopsy and also a pap.    Best contact: [phone]

## 2018-08-17 NOTE — TELEPHONE ENCOUNTER
I called and left message for patient to call me back to schedule appt for procedure.  I told her I would be available this evening

## 2018-09-21 ENCOUNTER — TELEPHONE (OUTPATIENT)
Dept: FAMILY MEDICINE CLINIC | Age: 43
End: 2018-09-21

## 2018-09-21 DIAGNOSIS — E03.9 ACQUIRED HYPOTHYROIDISM: ICD-10-CM

## 2018-09-21 NOTE — TELEPHONE ENCOUNTER
----- Message from Nereida Spurling sent at 9/21/2018  9:53 AM EDT -----  Regarding: Dr. Jessica Nichole / Telephone  Pt would like to schedule an appt.  to come in to get blood work done so she can get her thyroid medication  Best contact: [phone]

## 2018-09-24 RX ORDER — LEVOTHYROXINE SODIUM 112 UG/1
112 TABLET ORAL
Qty: 30 TAB | Refills: 0 | Status: SHIPPED | OUTPATIENT
Start: 2018-09-24 | End: 2018-10-10 | Stop reason: SDUPTHER

## 2018-09-24 NOTE — TELEPHONE ENCOUNTER
Per Dr. Madeline Whiteside to refill thyroid medication for a month supply and patient scheduled appointment to come in for October 8th for repeat pap and biopsy. Patient verbalized understanding.

## 2018-10-08 ENCOUNTER — HOSPITAL ENCOUNTER (OUTPATIENT)
Dept: LAB | Age: 43
Discharge: HOME | End: 2018-10-08

## 2018-10-08 ENCOUNTER — OFFICE VISIT (OUTPATIENT)
Dept: FAMILY MEDICINE CLINIC | Age: 43
End: 2018-10-08

## 2018-10-08 VITALS
TEMPERATURE: 98.1 F | DIASTOLIC BLOOD PRESSURE: 84 MMHG | SYSTOLIC BLOOD PRESSURE: 123 MMHG | OXYGEN SATURATION: 97 % | BODY MASS INDEX: 37.59 KG/M2 | WEIGHT: 240 LBS | HEART RATE: 97 BPM | RESPIRATION RATE: 16 BRPM

## 2018-10-08 DIAGNOSIS — R87.619 ABNORMAL CERVICAL PAPANICOLAOU SMEAR, UNSPECIFIED ABNORMAL PAP FINDING: Primary | ICD-10-CM

## 2018-10-08 DIAGNOSIS — R79.89 ELEVATED LFTS: ICD-10-CM

## 2018-10-08 DIAGNOSIS — E03.9 ACQUIRED HYPOTHYROIDISM: ICD-10-CM

## 2018-10-08 DIAGNOSIS — Z87.42 HISTORY OF ENDOMETRIAL HYPERPLASIA: ICD-10-CM

## 2018-10-08 PROBLEM — E66.01 SEVERE OBESITY (HCC): Status: ACTIVE | Noted: 2018-10-08

## 2018-10-08 LAB
HCG URINE, QL. (POC): NEGATIVE
VALID INTERNAL CONTROL?: YES

## 2018-10-08 RX ORDER — ALBUTEROL SULFATE 90 UG/1
AEROSOL, METERED RESPIRATORY (INHALATION)
Refills: 1 | COMMUNITY
Start: 2018-09-24 | End: 2021-08-25

## 2018-10-08 NOTE — PROGRESS NOTES
Chief Complaint Patient presents with Solo Hitch Exam  
 Endometrial Biopsy 1. Have you been to the ER, urgent care clinic since your last visit? Hospitalized since your last visit? No 
 
2. Have you seen or consulted any other health care providers outside of the [address] [place] [address] since your last visit? Include any pap smears or colon screening.  No

## 2018-10-08 NOTE — PROGRESS NOTES
Patient here for pap and EMB. See hx below: 
 
7/2015 pap [facility] 
8/2015 colpo high grade lesion 3/2016 pap [facility] 
4/2016 [facility] CIN3, including at ecto margin, ECC neg 10/2016 pap NILM HPV- 
3/2017 EMB showing hyperplasia and glandular crowding 11/2017 pap NILM, HPV- 
11/2017 EMB normal  
 
[facility] 
OFFICE PROCEDURE PROGRESS NOTE Chart reviewed for the following: 
 Mary Ann MILLS DO, have reviewed the History, Physical and updated the Allergic reactions for Fanta Mendoza TIME OUT performed immediately prior to start of procedure: 
 Christi MILLS DO, have performed the following reviews on Fanta Mendoza prior to the start of the procedure: 
         
* Patient was identified by name and date of birth * Agreement on procedure being performed was verified * Risks and Benefits explained to the patient * Procedure site verified and marked as necessary * Patient was positioned for comfort * Consent was signed and verified Time: 4:16 PM 
 
Date of procedure: 10/8/2018 Procedure performed by: Mary Ann Martinez DO 
 
Provider assisted by: Pricila Ellis LPN Patient assisted by: self How tolerated by patient: tolerated the procedure well with no complications Post procedure pain: 0/10 Comments: none Endometrial Biopsy Procedure Note Endometrial biopsy was performed today. Indications: see cervical dysplasia hx above, see endometrial hyperplasia hx above Procedure Details Urine pregnancy test was done and result is negative. The risks including infection, bleeding, pain, and uterine perforation and benefits of the procedure were explained to the her and verbal and written informed consent was obtained. She was placed in the dorsal lithotomy position. A speculum inserted in the vagina, and pap collected. A tenaculum was placed on the superior portion of the cervix.  A \"Pipelle endometrial aspirator\" was used to sample the endometrium. Sample was sent for pathologic examination. The patient tolerated the procedure well and there were no complications. Plan: Ms. Kimberley Villagomez was advised to call for any fever or for prolonged or severe pain or bleeding. She was advised to use OTC ibuprofen as needed for mild to moderate pain. Anticipate results in approx 1 week. Will call patient with results and she was advised to call if she hasn't heard anything after 10 days.

## 2018-10-09 ENCOUNTER — HOSPITAL ENCOUNTER (OUTPATIENT)
Dept: LAB | Age: 43
Discharge: HOME | End: 2018-10-09
Payer: MEDICAID

## 2018-10-09 LAB
ALBUMIN SERPL-MCNC: 4.4 G/DL (ref 3.5–5.5)
ALBUMIN/GLOB SERPL: 1.4 {RATIO} (ref 1.2–2.2)
ALP SERPL-CCNC: 105 IU/L (ref 39–117)
ALT SERPL-CCNC: 19 IU/L (ref 0–32)
AST SERPL-CCNC: 18 IU/L (ref 0–40)
BILIRUB SERPL-MCNC: 0.2 MG/DL (ref 0–1.2)
BUN SERPL-MCNC: 7 MG/DL (ref 6–24)
BUN/CREAT SERPL: 9 (ref 9–23)
CALCIUM SERPL-MCNC: 9.1 MG/DL (ref 8.7–10.2)
CHLORIDE SERPL-SCNC: 104 MMOL/L (ref 96–106)
CO2 SERPL-SCNC: 20 MMOL/L (ref 20–29)
CREAT SERPL-MCNC: 0.82 MG/DL (ref 0.57–1)
GLOBULIN SER CALC-MCNC: 3.1 G/DL (ref 1.5–4.5)
GLUCOSE SERPL-MCNC: 83 MG/DL (ref 65–99)
POTASSIUM SERPL-SCNC: 4.3 MMOL/L (ref 3.5–5.2)
PROT SERPL-MCNC: 7.5 G/DL (ref 6–8.5)
SODIUM SERPL-SCNC: 140 MMOL/L (ref 134–144)
TSH SERPL DL<=0.005 MIU/L-ACNC: 1.92 UIU/ML (ref 0.45–4.5)

## 2018-10-09 PROCEDURE — 87624 HPV HI-RISK TYP POOLED RSLT: CPT | Performed by: FAMILY MEDICINE

## 2018-10-09 PROCEDURE — 88175 CYTOPATH C/V AUTO FLUID REDO: CPT | Performed by: FAMILY MEDICINE

## 2018-10-10 ENCOUNTER — TELEPHONE (OUTPATIENT)
Dept: FAMILY MEDICINE CLINIC | Age: 43
End: 2018-10-10

## 2018-10-10 DIAGNOSIS — E03.9 ACQUIRED HYPOTHYROIDISM: ICD-10-CM

## 2018-10-10 RX ORDER — LEVOTHYROXINE SODIUM 112 UG/1
112 TABLET ORAL
Qty: 90 TAB | Refills: 3 | Status: SHIPPED | OUTPATIENT
Start: 2018-10-10 | End: 2018-10-22 | Stop reason: SDUPTHER

## 2018-10-10 NOTE — TELEPHONE ENCOUNTER
----- Message from Paris Leavitt DO sent at 10/10/2018  4:12 PM EDT -----  Please let patient know her labs are normal including thyroid. Okay to send in more thyroid medication if she needs it (a years worth is okay). Still waiting on biopsy results. MVest     ----- Message -----     From: Severa Pollard     Sent: 10/8/2018   4:07 PM       To:  Christi De La Rosa DO

## 2018-10-10 NOTE — TELEPHONE ENCOUNTER
Called patient and informed her of lab results and advised her we are still waiting on biopsy results. Patient verbalized understanding. Per verbal order from Dr. Jm Cullen to send in thyroid medication to pharmacy.

## 2018-10-22 NOTE — PROGRESS NOTES
Hx [facility] in 2016 for CIN3. Per ASCCP guidelines repeat cotesting at 12 and 24 months, both NILM and HPV neg. Per guidelines needs repeat cotesting next in 3 years which will be 10/2021, if normal then routine screening (Q5 years if continue to cotest and normal). Will communicate this to patient.

## 2018-10-23 ENCOUNTER — DOCUMENTATION ONLY (OUTPATIENT)
Dept: FAMILY MEDICINE CLINIC | Age: 43
End: 2018-10-23

## 2018-10-23 ENCOUNTER — TELEPHONE (OUTPATIENT)
Dept: FAMILY MEDICINE CLINIC | Age: 43
End: 2018-10-23

## 2018-10-23 NOTE — TELEPHONE ENCOUNTER
Called patient and informed her she does not need anymore endometrial biopsies unless her bleeding becomes concerning again. Advised her since she has had 2 normal pap smears 2 years in a row she needs her next pap smear in 3 years. Patient verbalized understanding.

## 2018-10-23 NOTE — PROGRESS NOTES
Spoke with Dr. Christofer Pimentel regarding hx irregular bleeding with glandular crowding on EMB. Has had two subsequent normal EMBs. He does not recommend continuing EMBs. Of note, pt bleeding is now normal/regular monthly. Regarding hx cervical dysplasia, Hx [facility] in 2016 for CIN3.  Per ASCCP guidelines repeat cotesting at 12 and 24 months, both NILM and HPV neg.  Per guidelines needs repeat cotesting next in 3 years which will be 10/2021, if normal then routine screening (Q5 years if continue to cotest and normal).      Pt informed of the above.

## 2018-10-23 NOTE — TELEPHONE ENCOUNTER
----- Message from Alea Landrum DO sent at 10/23/2018  8:52 AM EDT -----  I explained the results to this patient yesterday but told her I was going to talk to Dr. Bi Blas to confirm the plan going forward and call her back. Will you let her know that she does not need anymore endometrial biopsies unless her bleeding becomes concerning again and because she has had 2 normal paps 2 years in a row, she does not need another for 3 years. Thanks  Ezequiel     ----- Message -----  From: [facility] Results  Sent: 10/11/2018   6:07 PM  To:  Christi De La Rosa DO

## 2018-11-12 RX ORDER — SERTRALINE HYDROCHLORIDE 50 MG/1
200 TABLET, FILM COATED ORAL DAILY
Qty: 120 TAB | Refills: 6 | Status: SHIPPED | OUTPATIENT
Start: 2018-11-12

## 2021-06-03 ENCOUNTER — TELEPHONE (OUTPATIENT)
Dept: FAMILY MEDICINE CLINIC | Age: 46
End: 2021-06-03

## 2021-06-03 NOTE — TELEPHONE ENCOUNTER
Patient not due for Pap until 10/2021. Called patient and she is aware to call in October for an appointment.

## 2021-06-17 ENCOUNTER — VIRTUAL VISIT (OUTPATIENT)
Dept: FAMILY MEDICINE CLINIC | Age: 46
End: 2021-06-17
Payer: MEDICAID

## 2021-06-17 DIAGNOSIS — E03.9 ACQUIRED HYPOTHYROIDISM: Primary | ICD-10-CM

## 2021-06-17 PROCEDURE — 99213 OFFICE O/P EST LOW 20 MIN: CPT | Performed by: STUDENT IN AN ORGANIZED HEALTH CARE EDUCATION/TRAINING PROGRAM

## 2021-06-17 RX ORDER — LEVOTHYROXINE SODIUM 125 UG/1
125 TABLET ORAL
Qty: 90 TABLET | Refills: 1 | Status: SHIPPED | OUTPATIENT
Start: 2021-06-17 | End: 2021-10-24 | Stop reason: DRUGHIGH

## 2021-06-17 NOTE — PROGRESS NOTES
Kadi Ortiz  39 y.o. female  1975 [address] [facility] [address]  [phone]   [address]:    Telemedicine Progress Note  Julissa Coelho MD       Encounter Date and Time: June 17, 2021 at 1:29 PM    Consent: Kadi Ortiz, who was seen by synchronous (real-time) audio-video technology, and/or her healthcare decision maker, is aware that this patient-initiated, Telehealth encounter on 6/17/2021 is a billable service, with coverage as determined by her insurance carrier. She is aware that she may receive a bill and has provided verbal consent to proceed: Yes. Chief Complaint   Patient presents with    Labs     needs thyroid checked     History of Present Illness   Fanta Ramos is a 39 y.o. female was evaluated by synchronous (real-time) audio-video technology from home, through a secure patient portal.    Patient presents today to have her thyroid checked. It was last checked about a year ago at [facility]. Last checked here in 2018. She is currently on synthroid 120mcg but has not taken it in a month. She does not have any skin, GI or neurologic symptoms. Review of Systems   ROS    Vitals/Objective:     General: alert, cooperative, no distress   Mental  status: mental status: alert, oriented to person, place, and time, normal mood, behavior, speech, dress, motor activity, and thought processes   Resp: resp: normal effort and no respiratory distress   Neuro: neuro: no gross deficits   Skin: skin: no discoloration or lesions of concern on visible areas   Due to this being a TeleHealth evaluation, many elements of the physical examination are unable to be assessed. Assessment and Plan:   1. Acquired hypothyroidism  - sending in synthroid 125mcg daily  - patient instructed to re-start medication and then have bloodwork checked  - TSH 3RD GENERATION; Future  - levothyroxine (SYNTHROID) 125 mcg tablet; Take 1 Tablet by mouth Daily (before breakfast). Dispense: 90 Tablet; Refill: 1    We discussed the expected course, resolution and complications of the diagnosis(es) in detail. Medication risks, benefits, costs, interactions, and alternatives were discussed as indicated. I advised her to contact the office if her condition worsens, changes or fails to improve as anticipated. She expressed understanding with the diagnosis(es) and plan. Patient understands that this encounter was a temporary measure, and the importance of further follow up and examination was emphasized. Patient verbalized understanding. Patient informed to follow up: lab work after starting medication again. Electronically Signed: Osbaldo Mohamud MD     Estrada Addison is a 39 y.o. female who was evaluated by an audio-video encounter for concerns as above. Patient identification was verified prior to start of the visit. A caregiver was present when appropriate. Due to this being a TeleHealth encounter (During [facility] public health emergency), evaluation of the following organ systems was limited: Vitals/Constitutional/EENT/Resp/CV/GI//MS/Neuro/Skin/Heme-Lymph-Imm. Pursuant to the emergency declaration under the [facility], 1135 waiver authority and the [employer] Act, this Virtual Visit was conducted, with patient's (and/or legal guardian's) consent, to reduce the patient's risk of exposure to COVID-19 and provide necessary medical care. Services were provided through a synchronous discussion virtually to substitute for in-person clinic visit. I was at home. The patient was at home. History   Patients past medical, surgical and family histories were reviewed and updated.       Past Medical History:   Diagnosis Date    Abnormal Pap smear     colposcopy, wnl since    Asthma     stress induced(last in january)    Group B Streptococcus carrier, +RV culture, currently pregnant 1/20/16    Infertility     IUI to get pregnant    Polycystic ovarian syndrome     Thyroid disease     Trauma     broken R ankle, R foot, L arm     Past Surgical History:   Procedure Laterality Date     DELIVERY ONLY  12    CKC, AKA COLD KNIFE CONE      HX CHOLECYSTECTOMY      HX COLPOSCOPY  3/17/16    HX GYN      Csection X2    HX HEENT      DENTAL     Family History   Problem Relation Age of Onset    Thyroid Disease Mother         also in maternal aunt    Cancer Maternal Grandmother     Cancer Paternal Grandmother         brain tumor    Anesth Problems Neg Hx      Social History     Tobacco Use    Smoking status: Never Smoker    Smokeless tobacco: Never Used   Substance Use Topics    Alcohol use: No    Drug use: No     Patient Active Problem List   Diagnosis Code    Anovulatory N97.0    HPV test positive [id number]    Abnormal Pap smear of cervix R87.619    Cervical dysplasia N87.9    Obesity (BMI 30-39. 9) E66.9    Change in multiple nevi D22.9    Family history of thyroid disease in mother Z80.51    Acquired hypothyroidism E03.9    PCOS (polycystic ovarian syndrome) E28.2    Anxiety F41.9    Endometrial hyperplasia N85.00    Acute pancreatitis K85.90    Common bile duct obstruction K83.1    Severe obesity (HCC) E66.01          Current Medications/Allergies   Medications and Allergies reviewed:    Current Outpatient Medications   Medication Sig Dispense Refill    sertraline (ZOLOFT) 50 mg tablet TAKE 4 TABS BY MOUTH DAILY. INDICATIONS: ANXIETY WITH DEPRESSION 120 Tab 6    levothyroxine (SYNTHROID) 112 mcg tablet TAKE 1 TABLET BY MOUTH EVERY DAY BEFORE BREAKFAST 30 Tab 11    VENTOLIN HFA 90 mcg/actuation inhaler TAKE 2 PUFFS BY MOUTH EVERY 4 TO 6 HOURS AS NEEDED FOR BREATHING  1     Allergies   Allergen Reactions    Hydrocodone Shortness of Breath and Vertigo    Omeprazole Hives    Quibron [Theophylline-Guaifenesin] Other (comments)     Hallucinations and violent.      Tape [Adhesive] Rash

## 2021-08-25 ENCOUNTER — APPOINTMENT (OUTPATIENT)
Dept: GENERAL RADIOLOGY | Age: 46
End: 2021-08-25
Attending: NURSE PRACTITIONER
Payer: MEDICAID

## 2021-08-25 ENCOUNTER — HOSPITAL ENCOUNTER (EMERGENCY)
Age: 46
Discharge: HOME | End: 2021-08-25
Attending: EMERGENCY MEDICINE | Admitting: EMERGENCY MEDICINE
Payer: MEDICAID

## 2021-08-25 VITALS
SYSTOLIC BLOOD PRESSURE: 114 MMHG | WEIGHT: 240 LBS | DIASTOLIC BLOOD PRESSURE: 77 MMHG | TEMPERATURE: 99 F | BODY MASS INDEX: 37.67 KG/M2 | HEART RATE: 98 BPM | RESPIRATION RATE: 18 BRPM | OXYGEN SATURATION: 97 % | HEIGHT: 67 IN

## 2021-08-25 DIAGNOSIS — U07.1 COVID-19: ICD-10-CM

## 2021-08-25 DIAGNOSIS — J18.9 PNEUMONIA OF BOTH LUNGS DUE TO INFECTIOUS ORGANISM, UNSPECIFIED PART OF LUNG: Primary | ICD-10-CM

## 2021-08-25 LAB
COMMENT, HOLDF: NORMAL
SAMPLES BEING HELD,HOLD: NORMAL

## 2021-08-25 PROCEDURE — 99282 EMERGENCY DEPT VISIT SF MDM: CPT

## 2021-08-25 PROCEDURE — 71045 X-RAY EXAM CHEST 1 VIEW: CPT

## 2021-08-25 PROCEDURE — 74011250636 HC RX REV CODE- 250/636: Performed by: NURSE PRACTITIONER

## 2021-08-25 RX ORDER — ALBUTEROL SULFATE 90 UG/1
1 AEROSOL, METERED RESPIRATORY (INHALATION)
Qty: 1 INHALER | Refills: 0 | Status: SHIPPED | OUTPATIENT
Start: 2021-08-25

## 2021-08-25 RX ORDER — AZITHROMYCIN 250 MG/1
TABLET, FILM COATED ORAL
Qty: 6 TABLET | Refills: 0 | Status: SHIPPED | OUTPATIENT
Start: 2021-08-25 | End: 2021-08-25 | Stop reason: SDUPTHER

## 2021-08-25 RX ORDER — AZITHROMYCIN 250 MG/1
TABLET, FILM COATED ORAL
Qty: 6 TABLET | Refills: 0 | Status: SHIPPED | OUTPATIENT
Start: 2021-08-25 | End: 2021-08-30

## 2021-08-25 RX ADMIN — SODIUM CHLORIDE 1000 ML: 9 INJECTION, SOLUTION INTRAVENOUS at 15:46

## 2021-08-25 NOTE — ED TRIAGE NOTES
Pt reports she started not feeling well 6 days ago and tested positive for covid 5 days ago. Reports generalized weakness, cough, SOB, and feeling dehydrated. Not vaccinated.

## 2021-08-25 NOTE — ED NOTES
Patient discharged by provider in triage prior to nurse being assigned to patient. Patient does not appear to be in any acute distress/shows no evidence of clinical instability at this time. Provider has reviewed discharge instructions with the patient/family. The patient/family verbalized understanding instructions as well as need for follow up for any further symptoms.     Discharge papers given, education provided, and any questions answered. Patient discharged by provider.

## 2021-08-25 NOTE — ED PROVIDER NOTES
This is a 68-year-old female who presents ambulatory to the emergency room with complaints of worsening symptoms and a known COVID-19 patient. Patient states she has had symptoms since Thursday, was diagnosed on Friday with COVID-19. Was not vaccinated. Comes to the emergency room today with worsening fatigue, body aches and pains, weakness and shortness of breath. Denies any chest pain, dizziness, nausea or vomiting, fevers or chills. Has not taken any medication prior to arrival for her symptoms. There are no further complaints at this time. Laurence Birmingham MD  Past Medical History:  No date: Abnormal Pap smear      Comment:  colposcopy, wnl since  No date: Asthma      Comment:  stress induced(last in january)  16: Group B Streptococcus carrier, +RV culture, currently   pregnant  No date: Infertility      Comment:  IUI to get pregnant  No date: Polycystic ovarian syndrome  No date: Thyroid disease  No date: Trauma      Comment:  broken R ankle, R foot, L arm  Past Surgical History:  12:  DELIVERY ONLY  2016: CKC, AKA COLD KNIFE CONE  No date: HX CHOLECYSTECTOMY  3/17/16: HX COLPOSCOPY  No date: HX GYN      Comment:  Csection X2  : HX HEENT      Comment:  DENTAL    Fanta Mendoza was evaluated in the Emergency Department on 2021 for the symptoms described in the history of present illness. He/she was evaluated in the context of the global COVID-19 pandemic, which necessitated consideration that the patient might be at risk for infection with the SARS-CoV-2 virus that causes COVID-19. Institutional protocols and algorithms that pertain to the evaluation of patients at risk for COVID-19 are in a state of rapid change based on information released by regulatory bodies including the CDC and federal and state organizations. These policies and algorithms were followed during the patient's care in the ED.     Surrogate Decision Maker (Who do you want to make decisions for you in the event you are not able to?): Extended Emergency Contact Information  Primary Emergency Contact: [address] Phone: [phone]  Mobile Phone: [phone]  Relation: Parent    Ventilation (Do you want to be intubated and mechanically ventilated?):  Yes    CPR (Do you want chest compressions and electricity in an attempt to restart your heart?): Yes             Past Medical History:   Diagnosis Date    Abnormal Pap smear     colposcopy, wnl since    Asthma     stress induced(last in january)    Group B Streptococcus carrier, +RV culture, currently pregnant 16    Infertility     IUI to get pregnant    Polycystic ovarian syndrome     Thyroid disease     Trauma     broken R ankle, R foot, L arm       Past Surgical History:   Procedure Laterality Date     DELIVERY ONLY  12    CKC, AKA COLD KNIFE CONE      HX CHOLECYSTECTOMY      HX COLPOSCOPY  3/17/16    HX GYN      Csection X2    HX HEENT      DENTAL         Family History:   Problem Relation Age of Onset    Thyroid Disease Mother         also in maternal aunt    Cancer Maternal Grandmother     Cancer Paternal Grandmother         brain tumor    Anesth Problems Neg Hx        Social History     Socioeconomic History    Marital status:      Spouse name: Not on file    Number of children: Not on file    Years of education: Not on file    Highest education level: Not on file   Occupational History    Not on file   Tobacco Use    Smoking status: Never Smoker    Smokeless tobacco: Never Used   Substance and Sexual Activity    Alcohol use: No    Drug use: No    Sexual activity: Not Currently     Partners: Male     Birth control/protection: None   Other Topics Concern    Not on file   Social History Narrative    Not on file     Social Determinants of Health     Financial Resource Strain:     Difficulty of Paying Living Expenses:    Food Insecurity:     Worried About Running Out of Food in the Last Year:    [address] in the Last Year:    Transportation Needs:     Lack of Transportation (Medical):  Lack of Transportation (Non-Medical):    Physical Activity:     Days of Exercise per Week:     Minutes of Exercise per Session:    Stress:     Feeling of Stress :    Social Connections:     Frequency of Communication with Friends and Family:     Frequency of Social Gatherings with Friends and Family:     Attends [employer] Services:     Active Member of Clubs or Organizations:     Attends Club or Organization Meetings:     Marital Status:    Intimate Partner Violence:     Fear of Current or Ex-Partner:     Emotionally Abused:     Physically Abused:     Sexually Abused: ALLERGIES: Hydrocodone, Omeprazole, Quibron [theophylline-guaifenesin], and Tape [adhesive]    Review of Systems   Constitutional: Positive for fatigue. Negative for appetite change, chills, diaphoresis and fever. HENT: Negative for congestion, ear discharge, ear pain, sinus pressure, sinus pain, sore throat and trouble swallowing. Eyes: Negative for photophobia, pain, redness and visual disturbance. Respiratory: Positive for chest tightness and shortness of breath. Negative for wheezing. Cardiovascular: Negative for chest pain and palpitations. Gastrointestinal: Negative for abdominal distention, abdominal pain, nausea and vomiting. Endocrine: Negative. Genitourinary: Negative for difficulty urinating, flank pain, frequency and urgency. Musculoskeletal: Positive for myalgias. Negative for back pain, neck pain and neck stiffness. Skin: Negative for color change, pallor, rash and wound. Allergic/Immunologic: Negative. Neurological: Positive for weakness. Negative for dizziness, speech difficulty and headaches. Hematological: Does not bruise/bleed easily. Psychiatric/Behavioral: Negative for behavioral problems. The patient is not nervous/anxious.         Vitals: 08/25/21 1453   BP: 98/67   Pulse: (!) 103   Resp: 18   Temp: 99.6 °F (37.6 °C)   SpO2: 97%   Weight: 108.9 kg (240 lb)   Height: 5' 7\" (1.702 m)            Physical Exam  Vitals and nursing note reviewed. Constitutional:       General: She is not in acute distress. Appearance: She is well-developed. HENT:      Head: Normocephalic and atraumatic. Right Ear: External ear normal.      Left Ear: External ear normal.      Nose: Nose normal.      Mouth/Throat:      Mouth: Mucous membranes are moist.   Eyes:      General:         Right eye: No discharge. Left eye: No discharge. Conjunctiva/sclera: Conjunctivae normal.      Pupils: Pupils are equal, round, and reactive to light. Neck:      Vascular: No JVD. Trachea: No tracheal deviation. Cardiovascular:      Rate and Rhythm: Regular rhythm. Tachycardia present. Pulses: Normal pulses. Heart sounds: Normal heart sounds. No murmur heard. Pulmonary:      Effort: Pulmonary effort is normal. No respiratory distress. Chest:      Chest wall: Tenderness present. Abdominal:      General: There is no distension. Tenderness: There is no guarding. Genitourinary:     Comments: Negative    Musculoskeletal:         General: No tenderness. Normal range of motion. Cervical back: Normal range of motion and neck supple. Skin:     General: Skin is warm and dry. Capillary Refill: Capillary refill takes less than 2 seconds. Coloration: Skin is not pale. Findings: No erythema or rash. Neurological:      General: No focal deficit present. Mental Status: She is alert and oriented to person, place, and time. Motor: No weakness. Coordination: Coordination normal.   Psychiatric:         Mood and Affect: Mood normal.         Behavior: Behavior normal.         Thought Content:  Thought content normal.         Judgment: Judgment normal.          MDM  Number of Diagnoses or Management Options  COVID-19: established and worsening  Pneumonia of both lungs due to infectious organism, unspecified part of lung: new and requires workup  Diagnosis management comments: Differential diagnosis includes worsening COVID-19, pneumonia, viral syndrome and others. After physical assessment and review of x-ray and vital signs, patient was discharged home and will follow-up with PCP. Isolation per CDC recommendations. Return to the emergency room with worsening symptoms. Patient in agreement with plan of care. Amount and/or Complexity of Data Reviewed  Tests in the radiology section of CPT®: reviewed and ordered         Labs Reviewed   SAMPLES BEING HELD     XR CHEST PORT    Result Date: 8/25/2021  Bilateral infiltrates of moderate degree consistent with pneumonia    3:52 PM  Pt has been reexamined. Pt has no new complaints, changes or physical findings. Care plan outlined and precautions discussed. All available results were reviewed with pt. All medications were reviewed with pt. All of pt's questions and concerns were addressed. Pt agrees to F/U as instructed and agrees to return to ED upon further deterioration. Pt is ready to go home. Sweta Pickard, NP    Please note that this dictation was completed with [email or web address], the computer voice recognition software. Quite often unanticipated grammatical, syntax, homophones, and other interpretive errors are inadvertently transcribed by the computer software. Please disregard these errors. Please excuse any errors that have escaped final proofreading. Thank you.     Procedures

## 2021-08-25 NOTE — Clinical Note
[address]  [facility] EMERGENCY DEPT  [address] [address]  [phone]    Work/School Note    Date: 8/25/2021     To Whom It May concern:    Sylvester Herrera was evaulated by the following provider(s):  Attending Provider: Miguel Angel Emery MD  Nurse Practitioner: Adelso Sepulveda NP.   [address] virus is suspected. Per the CDC guidelines we recommend home isolation until the following conditions are all met:    1. At least 10 days have passed since symptoms first appeared and  2. At least 24 hours have passed since last fever without the use of fever-reducing medications and  3.  Symptoms (e.g., cough, shortness of breath) have improved    Sincerely,          Suyapa Zhang NP

## 2021-08-26 ENCOUNTER — PATIENT OUTREACH (OUTPATIENT)
Dept: CASE MANAGEMENT | Age: 46
End: 2021-08-26

## 2021-08-26 NOTE — PROGRESS NOTES
ED 8/25/2021:  Pneumonia of both lungs due to infectious organi/COVID 19+    Patient on Cov19 D/C [place] Enrollment Report/fu Contact. Left message on voice mail with my contact information for return call.   Need to assess for d/c needs post ED

## 2021-09-16 ENCOUNTER — PATIENT OUTREACH (OUTPATIENT)
Dept: CASE MANAGEMENT | Age: 46
End: 2021-09-16

## 2021-09-16 NOTE — PROGRESS NOTES
ED 8/25/2021:  Pneumonia of both lungs due to infectious organi/COVID 19+    Patient resolved from Transition of Care episode on 9/16/2021. ACM/CTN was unsuccessful at contacting this patient today. Patient/family was provided the following resources and education related to COVID-19 during the initial call:                         Signs, symptoms and red flags related to COVID-19            CDC exposure and quarantine guidelines            [facility] exposure contact - [phone]            Contact for their local Department of Health                 Patient has not had any additional ED or hospital visits. No further outreach scheduled with this CTN/ACM. Episode of Care resolved. Patient has this CTN/ACM contact information if future needs arise.

## 2021-10-21 NOTE — PROGRESS NOTES
HPI:  Chris Painting is a 39 y.o. female presenting for well woman exam.     Last menstrual period was 21  Length of periods: 4-5 days  Number of days between periods: 30 days  Menstrual flow: heavy initially        Sexually active?: no, havent in 5 years    Diet: toast, sandwich, veggies, chicken    Exercise: walks 1 mile daily    Nonsmoker, non drinker, no drugs    Immunizations - reviewed:   Immunization History   Administered Date(s) Administered    Influenza Vaccine [employer]) PF (>6 Mo Flulaval, Fluarix, and >3 Yrs Afluria, Fluzone 87907) 2015    TDAP Vaccine 2012    Tdap 2015     Flu: declined      Health Maintenance reviewed -  Pap smear : 2018, NILM HPV neg. Has a hx of CIN3      Allergies- reviewed: Allergies   Allergen Reactions    Hydrocodone Shortness of Breath and Vertigo    Omeprazole Hives    Quibron [Theophylline-Guaifenesin] Other (comments)     Hallucinations and violent.  Tape [Adhesive] Rash         Medications- reviewed:   Current Outpatient Medications   Medication Sig    MULTIVITAMIN PO Take  by mouth.  fluconazole (DIFLUCAN) 150 mg tablet Take 1 Tablet by mouth daily for 1 day. FDA advises cautious prescribing of oral fluconazole in pregnancy.  levothyroxine (SYNTHROID) 125 mcg tablet Take 1 Tablet by mouth Daily (before breakfast).  albuterol (ProAir HFA) 90 mcg/actuation inhaler Take 1 Puff by inhalation every four (4) hours as needed for Wheezing. (Patient not taking: Reported on 10/22/2021)    sertraline (ZOLOFT) 50 mg tablet TAKE 4 TABS BY MOUTH DAILY. INDICATIONS: ANXIETY WITH DEPRESSION (Patient not taking: Reported on 10/22/2021)     No current facility-administered medications for this visit.          Past Medical History- reviewed:  Past Medical History:   Diagnosis Date    Abnormal Pap smear     colposcopy, wnl since    Asthma     stress induced(last in january)    Group B Streptococcus carrier, +RV culture, currently pregnant 16    Infertility     IUI to get pregnant    Polycystic ovarian syndrome     Thyroid disease     Trauma     broken R ankle, R foot, L arm         Past Surgical History- reviewed:   Past Surgical History:   Procedure Laterality Date    CKC, AKA COLD KNIFE CONE      HX CHOLECYSTECTOMY      HX COLPOSCOPY  3/17/16    HX GYN      Csection X2    HX HEENT  1995    DENTAL    [place]  DELIVERY ONLY  12         Family History - reviewed:  Family History   Problem Relation Age of Onset    Thyroid Disease Mother         also in maternal aunt    Cancer Maternal Grandmother     Cancer Paternal Grandmother         brain tumor    Anesth Problems Neg Hx          Social History - reviewed:  Social History     Socioeconomic History    Marital status: LEGALLY      Spouse name: Not on file    Number of children: Not on file    Years of education: Not on file    Highest education level: Not on file   Occupational History    Not on file   Tobacco Use    Smoking status: Never Smoker    Smokeless tobacco: Never Used   Substance and Sexual Activity    Alcohol use: No    Drug use: No    Sexual activity: Not Currently     Partners: Male     Birth control/protection: None   Other Topics Concern    Not on file   Social History Narrative    Not on file     Social Determinants of Health     Financial Resource Strain:     Difficulty of Paying Living Expenses:    Food Insecurity:     Worried About Running Out of Food in the Last Year:     Ran Out of Food in the Last Year:    Transportation Needs:     Lack of Transportation (Medical):      Lack of Transportation (Non-Medical):    Physical Activity:     Days of Exercise per Week:     Minutes of Exercise per Session:    Stress:     Feeling of Stress :    Social Connections:     Frequency of Communication with Friends and Family:     Frequency of Social Gatherings with Friends and Family:     Attends [employer] Services:     Active Member of Clubs or Organizations:     Attends Club or Organization Meetings:     Marital Status:    Intimate Partner Violence:     Fear of Current or Ex-Partner:     Emotionally Abused:     Physically Abused:     Sexually Abused:                Review of Systems     Review of Systems   Constitutional: Negative for chills and fever. HENT: Negative for congestion. Respiratory: Negative for cough and shortness of breath. Cardiovascular: Negative for chest pain and palpitations. Gastrointestinal: Negative for nausea and vomiting. Genitourinary: Negative for dysuria and urgency. Neurological: Negative for dizziness and headaches. Physical Exam  Visit Vitals  /81 (BP 1 Location: Right upper arm, BP Patient Position: Sitting)   Pulse 87   Temp 97.8 °F (36.6 °C) (Oral)   Resp 16   Ht 5' 7\" (1.702 m)   Wt 241 lb 12.8 oz (109.7 kg)   SpO2 99%   BMI 37.87 kg/m²       Physical Exam  Exam conducted with a chaperone present. Constitutional:       General: She is not in acute distress. HENT:      Head: Normocephalic and atraumatic. Right Ear: Tympanic membrane normal.      Left Ear: Tympanic membrane normal.      Mouth/Throat:      Mouth: Mucous membranes are moist.      Pharynx: No oropharyngeal exudate or posterior oropharyngeal erythema. Eyes:      Pupils: Pupils are equal, round, and reactive to light. Cardiovascular:      Rate and Rhythm: Normal rate and regular rhythm. Pulses: Normal pulses. Heart sounds: No murmur heard. Pulmonary:      Effort: Pulmonary effort is normal.      Breath sounds: Normal breath sounds. No wheezing or rales. Abdominal:      General: There is no distension. Palpations: Abdomen is soft. Tenderness: There is no abdominal tenderness. There is no guarding or rebound. Hernia: No hernia is present. Genitourinary:     Vagina: Vaginal discharge present.       Cervix: Normal.      Uterus: Normal.    Musculoskeletal:      Right lower leg: No edema. Left lower leg: No edema. Neurological:      General: No focal deficit present. Mental Status: She is oriented to person, place, and time. Psychiatric:         Mood and Affect: Mood normal.           Assessment/Plan:    ICD-10-CM ICD-9-CM    1. Well woman exam with routine gynecological exam  Z01.419 V72.31 HEPATITIS C AB      PAP IG, APTIMA HPV AND RFX 16/18,45 ([id number])      METABOLIC PANEL, COMPREHENSIVE      METABOLIC PANEL, COMPREHENSIVE      HEPATITIS C AB   2. Acquired hypothyroidism  E03.9 244.9 TSH 3RD GENERATION      TSH 3RD GENERATION   3. Cervical dysplasia  N87.9 622.10 PAP IG, APTIMA HPV AND RFX 16/18,45 ([id number])   4. Screening for STD (sexually transmitted disease)  Z11.3 V74.5 HEPATITIS C AB      HEPATITIS C AB   5. Class 2 obesity due to excess calories without serious comorbidity with body mass index (BMI) of 37.0 to 37.9 in adult  E66.09 278.00 HEMOGLOBIN A1C WITH EAG    Z68.37 V85.37 LIPID PANEL      LIPID PANEL      HEMOGLOBIN A1C WITH EAG   6. Vaginal discharge  N89.8 623.5 AMB POC SMEAR, STAIN & INTERPRET, WET MOUNT      fluconazole (DIFLUCAN) 150 mg tablet         · Counseled re: diet, exercise, healthy lifestyle    · Appropriate labs, vaccines, imaging studies, and referrals ordered as listed above    · Hypothyroid: repeat TSH today. Will send refills pending lab work  · Vaginal yeast: diflucan sent to pharmacy          I have discussed the diagnosis with the patient and the intended plan as seen in the above orders. Patient verbalized understanding of the plan and agrees with the plan. The patient has received an after-visit summary and questions were answered concerning future plans. I have discussed medication side effects and warnings with the patient as well. Informed patient to return to the office if new symptoms arise.         Media Priya, DO  Family Medicine Resident

## 2021-10-22 ENCOUNTER — OFFICE VISIT (OUTPATIENT)
Dept: FAMILY MEDICINE CLINIC | Age: 46
End: 2021-10-22
Payer: MEDICAID

## 2021-10-22 VITALS
RESPIRATION RATE: 16 BRPM | BODY MASS INDEX: 37.95 KG/M2 | TEMPERATURE: 97.8 F | WEIGHT: 241.8 LBS | DIASTOLIC BLOOD PRESSURE: 81 MMHG | HEIGHT: 67 IN | SYSTOLIC BLOOD PRESSURE: 123 MMHG | OXYGEN SATURATION: 99 % | HEART RATE: 87 BPM

## 2021-10-22 DIAGNOSIS — Z11.3 SCREENING FOR STD (SEXUALLY TRANSMITTED DISEASE): ICD-10-CM

## 2021-10-22 DIAGNOSIS — N87.9 CERVICAL DYSPLASIA: ICD-10-CM

## 2021-10-22 DIAGNOSIS — E03.9 ACQUIRED HYPOTHYROIDISM: ICD-10-CM

## 2021-10-22 DIAGNOSIS — E66.09 CLASS 2 OBESITY DUE TO EXCESS CALORIES WITHOUT SERIOUS COMORBIDITY WITH BODY MASS INDEX (BMI) OF 37.0 TO 37.9 IN ADULT: ICD-10-CM

## 2021-10-22 DIAGNOSIS — N89.8 VAGINAL DISCHARGE: ICD-10-CM

## 2021-10-22 DIAGNOSIS — Z01.419 WELL WOMAN EXAM WITH ROUTINE GYNECOLOGICAL EXAM: Primary | ICD-10-CM

## 2021-10-22 PROCEDURE — 99213 OFFICE O/P EST LOW 20 MIN: CPT | Performed by: STUDENT IN AN ORGANIZED HEALTH CARE EDUCATION/TRAINING PROGRAM

## 2021-10-22 PROCEDURE — 99396 PREV VISIT EST AGE 40-64: CPT | Performed by: STUDENT IN AN ORGANIZED HEALTH CARE EDUCATION/TRAINING PROGRAM

## 2021-10-22 RX ORDER — FLUCONAZOLE 150 MG/1
150 TABLET ORAL DAILY
Qty: 1 TABLET | Refills: 0 | Status: SHIPPED | OUTPATIENT
Start: 2021-10-22 | End: 2021-10-23

## 2021-10-22 NOTE — PROGRESS NOTES
Kasey Pfeiffer is a 39 y.o. female    Chief Complaint   Patient presents with    Complete Physical     Patient is coming in for a complete physical. She also needs a pap today. She will like her thyroid medication refilled today. Patoent declined flu shot. She will like to comeback for labs. No other concerns. 1. Have you been to the ER, urgent care clinic since your last visit? Hospitalized since your last visit? No    2. Have you seen or consulted any other health care providers outside of the [address] [place] [address] since your last visit? Include any pap smears or colon screening. No      Visit Vitals  /81 (BP 1 Location: Right upper arm, BP Patient Position: Sitting)   Pulse 87   Temp 97.8 °F (36.6 °C) (Oral)   Resp 16   Ht 5' 7\" (1.702 m)   Wt 241 lb 12.8 oz (109.7 kg)   SpO2 99%   BMI 37.87 kg/m²           Health Maintenance Due   Topic Date Due    Hepatitis C Screening  Never done    COVID-19 Vaccine (1) Never done    Flu Vaccine (1) 09/01/2021         Medication Reconciliation completed, changes noted.   Please  Update medication list.

## 2021-10-23 LAB
ALBUMIN SERPL-MCNC: 4.2 G/DL (ref 3.8–4.8)
ALBUMIN/GLOB SERPL: 1.2 {RATIO} (ref 1.2–2.2)
ALP SERPL-CCNC: 89 IU/L (ref 44–121)
ALT SERPL-CCNC: 25 IU/L (ref 0–32)
AST SERPL-CCNC: 25 IU/L (ref 0–40)
BILIRUB SERPL-MCNC: 0.3 MG/DL (ref 0–1.2)
BUN SERPL-MCNC: 7 MG/DL (ref 6–24)
BUN/CREAT SERPL: 9 (ref 9–23)
CALCIUM SERPL-MCNC: 9.3 MG/DL (ref 8.7–10.2)
CHLORIDE SERPL-SCNC: 105 MMOL/L (ref 96–106)
CHOLEST SERPL-MCNC: 180 MG/DL (ref 100–199)
CO2 SERPL-SCNC: 20 MMOL/L (ref 20–29)
CREAT SERPL-MCNC: 0.81 MG/DL (ref 0.57–1)
EST. AVERAGE GLUCOSE BLD GHB EST-MCNC: 105 MG/DL
GLOBULIN SER CALC-MCNC: 3.5 G/DL (ref 1.5–4.5)
GLUCOSE SERPL-MCNC: 81 MG/DL (ref 65–99)
HBA1C MFR BLD: 5.3 % (ref 4.8–5.6)
HCV AB S/CO SERPL IA: <0.1 S/CO RATIO (ref 0–0.9)
HDLC SERPL-MCNC: 47 MG/DL
IMP & REVIEW OF LAB RESULTS: NORMAL
LDLC SERPL CALC-MCNC: 108 MG/DL (ref 0–99)
POTASSIUM SERPL-SCNC: 4.1 MMOL/L (ref 3.5–5.2)
PROT SERPL-MCNC: 7.7 G/DL (ref 6–8.5)
SODIUM SERPL-SCNC: 141 MMOL/L (ref 134–144)
TRIGL SERPL-MCNC: 141 MG/DL (ref 0–149)
TSH SERPL DL<=0.005 MIU/L-ACNC: 9.53 UIU/ML (ref 0.45–4.5)
VLDLC SERPL CALC-MCNC: 25 MG/DL (ref 5–40)

## 2021-10-24 DIAGNOSIS — E03.9 ACQUIRED HYPOTHYROIDISM: Primary | ICD-10-CM

## 2021-10-24 RX ORDER — LEVOTHYROXINE SODIUM 137 UG/1
137 TABLET ORAL
Qty: 60 TABLET | Refills: 0 | Status: SHIPPED | OUTPATIENT
Start: 2021-10-24 | End: 2021-12-08 | Stop reason: SDUPTHER

## 2021-10-24 NOTE — PROGRESS NOTES
Hep C neg. CMP ok. LDL shows improvement  TSH significantly elevated. Will need to increase home Synthroid.

## 2021-10-28 LAB
CYTOLOGIST CVX/VAG CYTO: NORMAL
CYTOLOGY CVX/VAG DOC CYTO: NORMAL
CYTOLOGY CVX/VAG DOC THIN PREP: NORMAL
DX ICD CODE: NORMAL
HPV I/H RISK 4 DNA CVX QL PROBE+SIG AMP: NEGATIVE
Lab: NORMAL
Lab: NORMAL
OTHER STN SPEC: NORMAL
STAT OF ADQ CVX/VAG CYTO-IMP: NORMAL

## 2021-12-07 ENCOUNTER — LAB ONLY (OUTPATIENT)
Dept: FAMILY MEDICINE CLINIC | Age: 46
End: 2021-12-07

## 2021-12-07 DIAGNOSIS — E03.9 ACQUIRED HYPOTHYROIDISM: ICD-10-CM

## 2021-12-08 ENCOUNTER — TELEPHONE (OUTPATIENT)
Dept: FAMILY MEDICINE CLINIC | Age: 46
End: 2021-12-08

## 2021-12-08 DIAGNOSIS — E03.9 ACQUIRED HYPOTHYROIDISM: ICD-10-CM

## 2021-12-08 LAB — TSH SERPL DL<=0.005 MIU/L-ACNC: 0.31 UIU/ML (ref 0.45–4.5)

## 2021-12-08 RX ORDER — LEVOTHYROXINE SODIUM 125 UG/1
TABLET ORAL
Qty: 30 TABLET | Refills: 0 | Status: SHIPPED | OUTPATIENT
Start: 2021-12-08 | End: 2022-01-05

## 2021-12-08 RX ORDER — LEVOTHYROXINE SODIUM 137 UG/1
TABLET ORAL
Qty: 30 TABLET | Refills: 0 | Status: SHIPPED | OUTPATIENT
Start: 2021-12-08 | End: 2022-01-21 | Stop reason: SINTOL

## 2021-12-08 NOTE — TELEPHONE ENCOUNTER
Called patient to discuss TSH results. Will treat with 137mcg M-Th and 125mcg F-Sun. Patient aware of plan. All questions answered.  Will repeat TSH in 6 weeks    Paula Baker DO

## 2021-12-13 DIAGNOSIS — E03.9 ACQUIRED HYPOTHYROIDISM: ICD-10-CM

## 2021-12-13 RX ORDER — LEVOTHYROXINE SODIUM 137 UG/1
TABLET ORAL
Qty: 30 TABLET | Refills: 0 | OUTPATIENT
Start: 2021-12-13

## 2021-12-31 DIAGNOSIS — E03.9 ACQUIRED HYPOTHYROIDISM: ICD-10-CM

## 2022-01-05 RX ORDER — LEVOTHYROXINE SODIUM 125 UG/1
TABLET ORAL
Qty: 30 TABLET | Refills: 0 | Status: SHIPPED | OUTPATIENT
Start: 2022-01-05 | End: 2022-01-21 | Stop reason: SDUPTHER

## 2022-01-18 ENCOUNTER — LAB ONLY (OUTPATIENT)
Dept: FAMILY MEDICINE CLINIC | Age: 47
End: 2022-01-18

## 2022-01-18 DIAGNOSIS — E03.9 ACQUIRED HYPOTHYROIDISM: ICD-10-CM

## 2022-01-19 LAB — TSH SERPL DL<=0.005 MIU/L-ACNC: 0.12 UIU/ML (ref 0.45–4.5)

## 2022-01-21 DIAGNOSIS — E03.9 ACQUIRED HYPOTHYROIDISM: ICD-10-CM

## 2022-01-21 RX ORDER — LEVOTHYROXINE SODIUM 125 UG/1
125 TABLET ORAL
Qty: 60 TABLET | Refills: 0 | Status: SHIPPED | OUTPATIENT
Start: 2022-01-21 | End: 2022-02-09 | Stop reason: SDUPTHER

## 2022-02-08 ENCOUNTER — OFFICE VISIT (OUTPATIENT)
Dept: FAMILY MEDICINE CLINIC | Age: 47
End: 2022-02-08
Payer: MEDICAID

## 2022-02-08 VITALS
DIASTOLIC BLOOD PRESSURE: 72 MMHG | RESPIRATION RATE: 16 BRPM | SYSTOLIC BLOOD PRESSURE: 112 MMHG | HEIGHT: 67 IN | WEIGHT: 237.8 LBS | BODY MASS INDEX: 37.32 KG/M2 | TEMPERATURE: 97.2 F | HEART RATE: 82 BPM | OXYGEN SATURATION: 98 %

## 2022-02-08 DIAGNOSIS — L65.9 HAIR LOSS: ICD-10-CM

## 2022-02-08 DIAGNOSIS — T73.2XXA FATIGUE DUE TO EXPOSURE, INITIAL ENCOUNTER: Primary | ICD-10-CM

## 2022-02-08 PROCEDURE — 99214 OFFICE O/P EST MOD 30 MIN: CPT | Performed by: STUDENT IN AN ORGANIZED HEALTH CARE EDUCATION/TRAINING PROGRAM

## 2022-02-08 RX ORDER — MINOXIDIL 2 %
SOLUTION, NON-ORAL TOPICAL
Qty: 60 ML | Refills: 1 | Status: SHIPPED | OUTPATIENT
Start: 2022-02-08

## 2022-02-08 NOTE — PROGRESS NOTES
Jenet Olszewski is a 42 y.o. female    Chief Complaint   Patient presents with    Post-COVID Symptoms     Ata is coming in [facility] she had COVID in 09/2021. She is having hairloss, brain feeling foggy and ache in joints. She is also having muscle soreness. No other concerns. 1. Have you been to the ER, urgent care clinic since your last visit? Hospitalized since your last visit? No  2. Have you seen or consulted any other health care providers outside of the [address] [place] [address] since your last visit? Include any pap smears or colon screening. No    Visit Vitals  /72 (BP 1 Location: Right upper arm, BP Patient Position: Sitting)   Pulse 82   Temp 97.2 °F (36.2 °C) (Oral)   Resp 16   Ht 5' 7\" (1.702 m)   Wt 237 lb 12.8 oz (107.9 kg)   SpO2 98%   BMI 37.24 kg/m²           Health Maintenance Due   Topic Date Due    COVID-19 Vaccine (1) Never done    Flu Vaccine (1) 09/01/2021         Medication Reconciliation completed, changes noted.   Please  Update medication list.

## 2022-02-08 NOTE — PROGRESS NOTES
Jenet Olszewski  42 y.o. female  1975  [id number]  [facility]  Progress Note     Encounter Date: 2/8/2022    Assessment and Plan:     Encounter Diagnoses     ICD-10-CM ICD-9-CM   1. Fatigue due to exposure, initial encounter  T73. 2XXA 994.4   2. Hair loss  L65.9 704.00       1. Fatigue due to exposure, initial encounter  - for 3 months after COVID infection. Could be post COVID symptoms however could also be thyroid seeing patient has a hx of uncontrolled thyroid disorder. Will order labs as noted below  - Discussed modifications to help in post COVID symptoms  - Patient to follow up pending lab work  - CBC W/O DIFF; Future  - METABOLIC PANEL, COMPREHENSIVE; Future  - TSH 3RD GENERATION; Future  - VITAMIN D, 25 HYDROXY; Future  - VITAMIN B12 & FOLATE; Future  - minoxidiL (ROGAINE) 2 % external solution; Apply 1ml twice daily to scalp  Dispense: 60 mL; Refill: 1    2. Hair loss  - TSH 3RD GENERATION; Future      I have discussed the diagnosis with the patient and the intended plan as seen in the above orders. she has expressed understanding. The patient has received an after-visit summary and questions were answered concerning future plans. I have discussed medication side effects and warnings with the patient as well. Electronically Signed: Mckenzie Estrada, DO    Current Medications after this visit     Current Outpatient Medications   Medication Sig    minoxidiL (ROGAINE) 2 % external solution Apply 1ml twice daily to scalp    levothyroxine (SYNTHROID) 125 mcg tablet Take 1 Tablet by mouth Daily (before breakfast) for 60 days.  MULTIVITAMIN PO Take  by mouth.  albuterol (ProAir HFA) 90 mcg/actuation inhaler Take 1 Puff by inhalation every four (4) hours as needed for Wheezing. (Patient not taking: Reported on 10/22/2021)    sertraline (ZOLOFT) 50 mg tablet TAKE 4 TABS BY MOUTH DAILY.  INDICATIONS: ANXIETY WITH DEPRESSION (Patient not taking: Reported on 10/22/2021) No current facility-administered medications for this visit. There are no discontinued medications. ~~~~~~~~~~~~~~~~~~~~~~~~~~~~~~~~~~~~~~~~~~~~~~~~~~~~~~~~~~~    Chief Complaint   Patient presents with    Post-COVID Symptoms     Ata is coming in [facility] she had COVID in 09/2021. She is having hairloss, brain feeling foggy and ache in joints. She is also having muscle soreness. No other concerns. History provided by patient  History of Present Illness   Patrick Singh is a 42 y.o. female who presents to clinic today for:  Post-COVID Symptoms (Ata is coming in [facility] she had [place] in 09/2021. She is having hairloss, brain feeling foggy and ache in joints. She is also having muscle soreness. No other concerns. )      Patient had COVID in 9/2021. Reports persistent fatigue, hair loss, brain fog and achy joints. Never had these symptoms until she got COVID. Has some concerns for how long this will last    Health Maintenance    Health Maintenance Due   Topic Date Due    COVID-19 Vaccine (1) Never done    Flu Vaccine (1) 09/01/2021     Review of Systems   Review of Systems   Constitutional: Positive for malaise/fatigue. Negative for chills and fever. HENT: Negative for congestion. Respiratory: Negative for shortness of breath. Cardiovascular: Negative for chest pain and palpitations. Musculoskeletal: Positive for joint pain. Vitals/Objective:     Vitals:    02/08/22 1456   BP: 112/72   Pulse: 82   Resp: 16   Temp: 97.2 °F (36.2 °C)   TempSrc: Oral   SpO2: 98%   Weight: 237 lb 12.8 oz (107.9 kg)   Height: 5' 7\" (1.702 m)     Body mass index is 37.24 kg/m². Wt Readings from Last 3 Encounters:   02/08/22 237 lb 12.8 oz (107.9 kg)   10/22/21 241 lb 12.8 oz (109.7 kg)   08/25/21 240 lb (108.9 kg)         Objective  Physical Exam  Constitutional:       General: She is not in acute distress. Appearance: She is not toxic-appearing.    HENT:      Head: Normocephalic and atraumatic. Cardiovascular:      Rate and Rhythm: Normal rate. Pulses: Normal pulses. Pulmonary:      Effort: Pulmonary effort is normal. No respiratory distress. Neurological:      General: No focal deficit present. Mental Status: She is oriented to person, place, and time. No results found for this or any previous visit (from the past 24 hour(s)). Disposition     No future appointments. History   Patient's past medical, surgical and family histories were reviewed and updated. Past Medical History:   Diagnosis Date    Abnormal Pap smear     colposcopy, wnl since    Asthma     stress induced(last in january)    Group B Streptococcus carrier, +RV culture, currently pregnant 16    Infertility     IUI to get pregnant    Polycystic ovarian syndrome     Thyroid disease     Trauma     broken R ankle, R foot, L arm     Past Surgical History:   Procedure Laterality Date    CKC, AKA COLD KNIFE CONE      HX CHOLECYSTECTOMY      HX COLPOSCOPY  3/17/16    HX GYN      Csection X2    HX HEENT      DENTAL    [place]  DELIVERY ONLY  12     Family History   Problem Relation Age of Onset    Thyroid Disease Mother         also in maternal aunt    Cancer Maternal Grandmother     Cancer Paternal Grandmother         brain tumor    Anesth Problems Neg Hx      Social History     Tobacco Use    Smoking status: Never Smoker    Smokeless tobacco: Never Used   Substance Use Topics    Alcohol use: No    Drug use: No       Allergies     Allergies   Allergen Reactions    Hydrocodone Shortness of Breath and Vertigo    Omeprazole Hives    Quibron [Theophylline-Guaifenesin] Other (comments)     Hallucinations and violent.      Tape [Adhesive] Rash

## 2022-02-09 DIAGNOSIS — E55.9 VITAMIN D DEFICIENCY: Primary | ICD-10-CM

## 2022-02-09 DIAGNOSIS — E03.9 ACQUIRED HYPOTHYROIDISM: ICD-10-CM

## 2022-02-09 LAB
25(OH)D3+25(OH)D2 SERPL-MCNC: 18.6 NG/ML (ref 30–100)
ALBUMIN SERPL-MCNC: 4.5 G/DL (ref 3.8–4.8)
ALBUMIN/GLOB SERPL: 1.3 {RATIO} (ref 1.2–2.2)
ALP SERPL-CCNC: 97 IU/L (ref 44–121)
ALT SERPL-CCNC: 18 IU/L (ref 0–32)
AST SERPL-CCNC: 17 IU/L (ref 0–40)
BILIRUB SERPL-MCNC: 0.4 MG/DL (ref 0–1.2)
BUN SERPL-MCNC: 6 MG/DL (ref 6–24)
BUN/CREAT SERPL: 7 (ref 9–23)
CALCIUM SERPL-MCNC: 9.3 MG/DL (ref 8.7–10.2)
CHLORIDE SERPL-SCNC: 105 MMOL/L (ref 96–106)
CO2 SERPL-SCNC: 22 MMOL/L (ref 20–29)
CREAT SERPL-MCNC: 0.87 MG/DL (ref 0.57–1)
ERYTHROCYTE [DISTWIDTH] IN BLOOD BY AUTOMATED COUNT: 15.6 % (ref 11.7–15.4)
FOLATE SERPL-MCNC: 12.6 NG/ML
GLOBULIN SER CALC-MCNC: 3.4 G/DL (ref 1.5–4.5)
GLUCOSE SERPL-MCNC: 90 MG/DL (ref 65–99)
HCT VFR BLD AUTO: 38.7 % (ref 34–46.6)
HGB BLD-MCNC: 12.2 G/DL (ref 11.1–15.9)
MCH RBC QN AUTO: 24.4 PG (ref 26.6–33)
MCHC RBC AUTO-ENTMCNC: 31.5 G/DL (ref 31.5–35.7)
MCV RBC AUTO: 77 FL (ref 79–97)
PLATELET # BLD AUTO: 260 X10E3/UL (ref 150–450)
POTASSIUM SERPL-SCNC: 4.2 MMOL/L (ref 3.5–5.2)
PROT SERPL-MCNC: 7.9 G/DL (ref 6–8.5)
RBC # BLD AUTO: 5.01 X10E6/UL (ref 3.77–5.28)
SODIUM SERPL-SCNC: 141 MMOL/L (ref 134–144)
TSH SERPL DL<=0.005 MIU/L-ACNC: 0.58 UIU/ML (ref 0.45–4.5)
VIT B12 SERPL-MCNC: 419 PG/ML (ref 232–1245)
WBC # BLD AUTO: 5.7 X10E3/UL (ref 3.4–10.8)

## 2022-02-09 RX ORDER — ERGOCALCIFEROL 1.25 MG/1
50000 CAPSULE ORAL
Qty: 8 CAPSULE | Refills: 0 | Status: SHIPPED | OUTPATIENT
Start: 2022-02-09 | End: 2022-03-06

## 2022-02-09 RX ORDER — LEVOTHYROXINE SODIUM 125 UG/1
125 TABLET ORAL
Qty: 90 TABLET | Refills: 3 | Status: SHIPPED | OUTPATIENT
Start: 2022-02-09 | End: 2022-04-10

## 2022-03-06 DIAGNOSIS — E55.9 VITAMIN D DEFICIENCY: ICD-10-CM

## 2022-03-06 RX ORDER — ERGOCALCIFEROL 1.25 MG/1
50000 CAPSULE ORAL
Qty: 4 CAPSULE | Refills: 1 | Status: SHIPPED | OUTPATIENT
Start: 2022-03-06 | End: 2022-04-05

## 2022-03-19 PROBLEM — F41.9 ANXIETY: Status: ACTIVE | Noted: 2017-07-25

## 2022-03-19 PROBLEM — E28.2 PCOS (POLYCYSTIC OVARIAN SYNDROME): Status: ACTIVE | Noted: 2017-01-12

## 2022-03-19 PROBLEM — K83.1 COMMON BILE DUCT OBSTRUCTION: Status: ACTIVE | Noted: 2018-02-20

## 2022-03-19 PROBLEM — N85.00 ENDOMETRIAL HYPERPLASIA: Status: ACTIVE | Noted: 2017-11-21

## 2022-03-19 PROBLEM — K85.90 ACUTE PANCREATITIS: Status: ACTIVE | Noted: 2018-02-20

## 2022-03-19 PROBLEM — E66.01 SEVERE OBESITY (HCC): Status: ACTIVE | Noted: 2018-10-08

## 2022-03-29 DIAGNOSIS — E55.9 VITAMIN D DEFICIENCY: ICD-10-CM

## 2022-04-05 RX ORDER — ERGOCALCIFEROL 1.25 MG/1
50000 CAPSULE ORAL
Qty: 4 CAPSULE | Refills: 1 | Status: SHIPPED | OUTPATIENT
Start: 2022-04-05

## 2023-05-21 SDOH — ECONOMIC STABILITY: INCOME INSECURITY: HOW HARD IS IT FOR YOU TO PAY FOR THE VERY BASICS LIKE FOOD, HOUSING, MEDICAL CARE, AND HEATING?: HARD

## 2023-05-21 SDOH — ECONOMIC STABILITY: HOUSING INSECURITY
IN THE LAST 12 MONTHS, WAS THERE A TIME WHEN YOU DID NOT HAVE A STEADY PLACE TO SLEEP OR SLEPT IN A SHELTER (INCLUDING NOW)?: NO

## 2023-05-21 SDOH — ECONOMIC STABILITY: FOOD INSECURITY: WITHIN THE PAST 12 MONTHS, YOU WORRIED THAT YOUR FOOD WOULD RUN OUT BEFORE YOU GOT MONEY TO BUY MORE.: PATIENT DECLINED

## 2023-05-21 SDOH — ECONOMIC STABILITY: FOOD INSECURITY: WITHIN THE PAST 12 MONTHS, THE FOOD YOU BOUGHT JUST DIDN'T LAST AND YOU DIDN'T HAVE MONEY TO GET MORE.: PATIENT DECLINED

## 2023-05-21 SDOH — ECONOMIC STABILITY: TRANSPORTATION INSECURITY
IN THE PAST 12 MONTHS, HAS LACK OF TRANSPORTATION KEPT YOU FROM MEETINGS, WORK, OR FROM GETTING THINGS NEEDED FOR DAILY LIVING?: PATIENT DECLINED

## 2023-05-23 ENCOUNTER — OFFICE VISIT (OUTPATIENT)
Age: 48
End: 2023-05-23
Payer: MEDICAID

## 2023-05-23 VITALS
OXYGEN SATURATION: 98 % | DIASTOLIC BLOOD PRESSURE: 84 MMHG | HEART RATE: 95 BPM | RESPIRATION RATE: 16 BRPM | WEIGHT: 249 LBS | HEIGHT: 67 IN | SYSTOLIC BLOOD PRESSURE: 127 MMHG | BODY MASS INDEX: 39.08 KG/M2 | TEMPERATURE: 97.6 F

## 2023-05-23 DIAGNOSIS — E03.9 ACQUIRED HYPOTHYROIDISM: ICD-10-CM

## 2023-05-23 DIAGNOSIS — R71.8 MICROCYTOSIS: ICD-10-CM

## 2023-05-23 DIAGNOSIS — F41.9 ANXIETY: ICD-10-CM

## 2023-05-23 DIAGNOSIS — E28.2 PCOS (POLYCYSTIC OVARIAN SYNDROME): ICD-10-CM

## 2023-05-23 DIAGNOSIS — Z87.42 HISTORY OF ABNORMAL CERVICAL PAP SMEAR: ICD-10-CM

## 2023-05-23 DIAGNOSIS — N85.00 ENDOMETRIAL HYPERPLASIA: ICD-10-CM

## 2023-05-23 DIAGNOSIS — E66.01 SEVERE OBESITY (HCC): ICD-10-CM

## 2023-05-23 DIAGNOSIS — Z00.00 ANNUAL PHYSICAL EXAM: Primary | ICD-10-CM

## 2023-05-23 PROBLEM — K85.90 ACUTE PANCREATITIS: Status: RESOLVED | Noted: 2018-02-20 | Resolved: 2023-05-23

## 2023-05-23 PROBLEM — K83.1 COMMON BILE DUCT OBSTRUCTION: Status: RESOLVED | Noted: 2018-02-20 | Resolved: 2023-05-23

## 2023-05-23 PROCEDURE — 99214 OFFICE O/P EST MOD 30 MIN: CPT | Performed by: STUDENT IN AN ORGANIZED HEALTH CARE EDUCATION/TRAINING PROGRAM

## 2023-05-23 PROCEDURE — 99396 PREV VISIT EST AGE 40-64: CPT | Performed by: STUDENT IN AN ORGANIZED HEALTH CARE EDUCATION/TRAINING PROGRAM

## 2023-05-23 RX ORDER — BUSPIRONE HYDROCHLORIDE 5 MG/1
5 TABLET ORAL 2 TIMES DAILY
Qty: 60 TABLET | Refills: 0 | Status: SHIPPED | OUTPATIENT
Start: 2023-05-23 | End: 2023-06-22

## 2023-05-23 RX ORDER — LEVOTHYROXINE SODIUM 0.12 MG/1
125 TABLET ORAL DAILY
COMMUNITY
End: 2023-05-23 | Stop reason: SDUPTHER

## 2023-05-23 RX ORDER — HYDROXYZINE HYDROCHLORIDE 25 MG/1
25 TABLET, FILM COATED ORAL 4 TIMES DAILY PRN
Qty: 120 TABLET | Refills: 0 | Status: SHIPPED | OUTPATIENT
Start: 2023-05-23 | End: 2023-06-02

## 2023-05-23 RX ORDER — LEVOTHYROXINE SODIUM 0.12 MG/1
125 TABLET ORAL DAILY
Qty: 90 TABLET | Refills: 1 | Status: SHIPPED | OUTPATIENT
Start: 2023-05-23

## 2023-05-23 RX ORDER — HYDROXYZINE HYDROCHLORIDE 25 MG/1
25 TABLET, FILM COATED ORAL 4 TIMES DAILY PRN
Qty: 120 TABLET | Refills: 0 | Status: SHIPPED | OUTPATIENT
Start: 2023-05-23 | End: 2023-05-23

## 2023-05-23 NOTE — PROGRESS NOTES
Joel Moody is a 41 y.o. female who presents for CPE. PMH includes PCOS, hypothyroidism, abnormal pap, endometrial hyperplasia, anxiety. No alcohol drugs or tobacco.  No updates to PMH, PSH, FH. Anxiety  -no longer taking zoloft 50mg daily  - estranged  has been acting very bizarre with dellusional thoughts  - has spoken with friend who is deputee  - has increased security at [employer]  - requesting something short term term, would not like to be on SSRIs feels they make her feel weird    Hypothyroidism  - last TSH in 2/8/22 was 0.576    PCOS  - periods are regular  - last A1c in 10/2021 was 5.3    Abnormal pap/endometrial hyperplasia  -s/p conization in 2016 for HSIL, had repeat cervical biopsy that was reportedly normal  - hyperplasia biopsied previously 3 times and reportedly clear each time  - back on every 5 year cotesting plan    Preventative   - tdap due 2025  - pap due 2026  - mammogram due  - colonoscopy due  - Hep C believes tested previously, defers today  - HIV negative in 2012    Past Medical History  Past Medical History:   Diagnosis Date    Abnormal Pap smear     colposcopy, wnl since    Asthma     stress induced(last in january)    Group B Streptococcus carrier, +RV culture, currently pregnant 1/20/16    Polycystic ovarian syndrome     Thyroid disease     Trauma     broken R ankle, R foot, L arm       Current Medications  Current Outpatient Medications   Medication Sig Dispense Refill    busPIRone (BUSPAR) 5 MG tablet Take 1 tablet by mouth 2 times daily 60 tablet 0    levothyroxine (SYNTHROID) 125 MCG tablet Take 1 tablet by mouth Daily 90 tablet 1    hydrOXYzine HCl (ATARAX) 25 MG tablet Take 1 tablet by mouth 4 times daily as needed for Anxiety 120 tablet 0    Multiple Vitamin (MULTIVITAMIN PO) Take by mouth       No current facility-administered medications for this visit.          Objective   Vital Signs  /84 (Site: Right Upper Arm, Position: Sitting)   Pulse 95

## 2023-05-23 NOTE — PROGRESS NOTES
I reviewed with the resident the medical history and the resident's findings on the physical examination. I discussed with the resident the patient's diagnosis and concur with the plan.      Mary Boone MD 5/23/2023

## 2023-05-23 NOTE — PROGRESS NOTES
Aron Diaz is a 41 y.o. female      Chief Complaint   Patient presents with    Medication Refill     Patient is coming in for medication refills and to check thyroid. No other concerns. 1. Have you been to the ER, urgent care clinic since your last visit? Hospitalized since your last visit? No    2. Have you seen or consulted any other health care providers outside of the [address] [place] [address] since your last visit? Include any pap smears or colon screening. No    Vitals:    05/23/23 0901   BP: 127/84   Site: Right Upper Arm   Position: Sitting   Pulse: 95   Resp: 16   Temp: 97.6 °F (36.4 °C)   TempSrc: Oral   SpO2: 98%   Weight: 249 lb (112.9 kg)   Height: 5' 7\" (1.702 m)            Health Maintenance Due   Topic Date Due    COVID-19 Vaccine (1) Never done    Depression Screen  10/22/2022    Colorectal Cancer Screen  Never done         Medication Reconciliation completed, changes noted.   Please  Update medication list.

## 2023-05-23 NOTE — PATIENT INSTRUCTIONS
Psychology today - website      [facility]  [address] [facility] , [address]  [phone]    Kelly Ca MD  [address] [facility], [address] [address]    [facility] (for residents of [facility] and [facility])  Referral/Intake Services [phone]   Emergency Services (24 hrs 365 days) [phone]   (all clinics during business hours & [place] location for after hours)     [facility] (Dr. Johan Ferrell): [place].Merku.Fusion [employer]. com/[address] [facility]. [address], [address]  Phone: [phone]  Fax: [phone]  Office Hours:  Mon: 10:00 am to 4:00 pm  Tue: 8:00 am to 6:00 pm  Wed-Thur: 8:00 am to 7:00 pm    [facility]: [place].no. com/  [place] ([phone]),  [place] ([phone])  [place] ([phone])  [place] [place] [address] [address] ([phone])    [address] [facility]  Addiction/Substance abuse   [place]: [phone]  [place]: [facility]: [facility].  [address] , [address] [place] [address] 23  [address]  Ph. [phone] Fax [phone] (adult, child): [email or web address]  [place]. Elyse [place] ([phone])  Alondra Mckay ([phone])    The [address], [facility] [address]  [phone]  [facility] Office  [address], [address] [facility]  [phone] [address] [facility] [address] [facility]  [address] [place] [address], [address] [place] [address], [address] [place] [address] Road  [phone]    [facility] [place] [facility] Pamela Coello, Ph.D  [address] [facility] [address] [facility], [address] [place] [address].   [phone]    Garrett Valladares,

## 2023-05-24 LAB
ALBUMIN SERPL-MCNC: 4.5 G/DL (ref 3.8–4.8)
ALBUMIN/GLOB SERPL: 1.5 {RATIO} (ref 1.2–2.2)
ALP SERPL-CCNC: 95 IU/L (ref 44–121)
ALT SERPL-CCNC: 21 IU/L (ref 0–32)
AST SERPL-CCNC: 18 IU/L (ref 0–40)
BILIRUB SERPL-MCNC: 0.4 MG/DL (ref 0–1.2)
BUN SERPL-MCNC: 5 MG/DL (ref 6–24)
BUN/CREAT SERPL: 6 (ref 9–23)
CALCIUM SERPL-MCNC: 9.3 MG/DL (ref 8.7–10.2)
CHLORIDE SERPL-SCNC: 103 MMOL/L (ref 96–106)
CHOLEST SERPL-MCNC: 166 MG/DL (ref 100–199)
CO2 SERPL-SCNC: 22 MMOL/L (ref 20–29)
CREAT SERPL-MCNC: 0.9 MG/DL (ref 0.57–1)
EGFRCR SERPLBLD CKD-EPI 2021: 80 ML/MIN/1.73
ERYTHROCYTE [DISTWIDTH] IN BLOOD BY AUTOMATED COUNT: 16.3 % (ref 11.7–15.4)
GLOBULIN SER CALC-MCNC: 3 G/DL (ref 1.5–4.5)
GLUCOSE SERPL-MCNC: 83 MG/DL (ref 70–99)
HBA1C MFR BLD: 5.5 % (ref 4.8–5.6)
HCT VFR BLD AUTO: 37.6 % (ref 34–46.6)
HDLC SERPL-MCNC: 46 MG/DL
HGB BLD-MCNC: 11.2 G/DL (ref 11.1–15.9)
IMP & REVIEW OF LAB RESULTS: NORMAL
LDLC SERPL CALC-MCNC: 94 MG/DL (ref 0–99)
MCH RBC QN AUTO: 22.3 PG (ref 26.6–33)
MCHC RBC AUTO-ENTMCNC: 29.8 G/DL (ref 31.5–35.7)
MCV RBC AUTO: 75 FL (ref 79–97)
PLATELET # BLD AUTO: 266 X10E3/UL (ref 150–450)
POTASSIUM SERPL-SCNC: 4.2 MMOL/L (ref 3.5–5.2)
PROT SERPL-MCNC: 7.5 G/DL (ref 6–8.5)
RBC # BLD AUTO: 5.02 X10E6/UL (ref 3.77–5.28)
SODIUM SERPL-SCNC: 139 MMOL/L (ref 134–144)
SPECIMEN STATUS REPORT: NORMAL
T4 FREE SERPL-MCNC: 1.35 NG/DL (ref 0.82–1.77)
TRIGL SERPL-MCNC: 147 MG/DL (ref 0–149)
TSH SERPL DL<=0.005 MIU/L-ACNC: 2.02 UIU/ML (ref 0.45–4.5)
VLDLC SERPL CALC-MCNC: 26 MG/DL (ref 5–40)
WBC # BLD AUTO: 6.9 X10E3/UL (ref 3.4–10.8)

## 2023-05-25 ENCOUNTER — TELEPHONE (OUTPATIENT)
Age: 48
End: 2023-05-25

## 2023-05-25 DIAGNOSIS — D50.9 IRON DEFICIENCY ANEMIA, UNSPECIFIED IRON DEFICIENCY ANEMIA TYPE: Primary | ICD-10-CM

## 2023-05-25 LAB
FERRITIN SERPL-MCNC: 7 NG/ML (ref 15–150)
IRON SATN MFR SERPL: 7 % (ref 15–55)
IRON SERPL-MCNC: 27 UG/DL (ref 27–159)
SPECIMEN STATUS REPORT: NORMAL
TIBC SERPL-MCNC: 385 UG/DL (ref 250–450)
UIBC SERPL-MCNC: 358 UG/DL (ref 131–425)

## 2023-05-25 RX ORDER — LANOLIN ALCOHOL/MO/W.PET/CERES
325 CREAM (GRAM) TOPICAL EVERY OTHER DAY
Qty: 90 TABLET | Refills: 3 | Status: SHIPPED | OUTPATIENT
Start: 2023-05-25

## 2023-05-25 NOTE — TELEPHONE ENCOUNTER
Contacted patient in regards to labs. Notable for very low iron and ferritin. No history of celiac disease. Had low iron after pregnancy. Periods not excessively heavy. Will start on oral iron, advised to take with vitamin C. Will refer to heme onc in case she needs iron transfusions. Referred for colonoscopy at last visit, emphasized importance of this. Recheck iron levels after 2 months of therapy to insure absorption.

## 2023-08-15 ENCOUNTER — NURSE ONLY (OUTPATIENT)
Age: 48
End: 2023-08-15

## 2023-08-15 DIAGNOSIS — D50.9 IRON DEFICIENCY ANEMIA, UNSPECIFIED IRON DEFICIENCY ANEMIA TYPE: ICD-10-CM

## 2023-08-16 LAB
FERRITIN SERPL-MCNC: 9 NG/ML (ref 15–150)
IRON SATN MFR SERPL: 8 % (ref 15–55)
IRON SERPL-MCNC: 30 UG/DL (ref 27–159)
TIBC SERPL-MCNC: 372 UG/DL (ref 250–450)
UIBC SERPL-MCNC: 342 UG/DL (ref 131–425)

## 2023-08-17 NOTE — RESULT ENCOUNTER NOTE
Iron sat and ferritin low. Discussed with patient on phone. Asymptomatic. Patient on iron 325 every other day. Discussed iron infusion option. Patient prefers to hold off until her appointment with Dr. Meera Mckee for further evaluation on 8/31. Holding off iron infusion at this time. ED precautions given.

## 2023-08-31 ENCOUNTER — OFFICE VISIT (OUTPATIENT)
Age: 48
End: 2023-08-31
Payer: MEDICAID

## 2023-08-31 VITALS
HEART RATE: 95 BPM | OXYGEN SATURATION: 99 % | TEMPERATURE: 98 F | BODY MASS INDEX: 39.71 KG/M2 | RESPIRATION RATE: 20 BRPM | HEIGHT: 67 IN | WEIGHT: 253 LBS

## 2023-08-31 DIAGNOSIS — D64.9 ANEMIA, UNSPECIFIED TYPE: ICD-10-CM

## 2023-08-31 DIAGNOSIS — D50.9 IRON DEFICIENCY ANEMIA, UNSPECIFIED IRON DEFICIENCY ANEMIA TYPE: ICD-10-CM

## 2023-08-31 DIAGNOSIS — D64.9 ANEMIA, UNSPECIFIED TYPE: Primary | ICD-10-CM

## 2023-08-31 PROCEDURE — 99204 OFFICE O/P NEW MOD 45 MIN: CPT | Performed by: INTERNAL MEDICINE

## 2023-08-31 RX ORDER — FAMOTIDINE 10 MG/ML
20 INJECTION, SOLUTION INTRAVENOUS
OUTPATIENT
Start: 2023-09-11

## 2023-08-31 RX ORDER — ACETAMINOPHEN 325 MG/1
650 TABLET ORAL
OUTPATIENT
Start: 2023-09-11

## 2023-08-31 RX ORDER — SODIUM CHLORIDE 9 MG/ML
5-250 INJECTION, SOLUTION INTRAVENOUS PRN
OUTPATIENT
Start: 2023-09-11

## 2023-08-31 RX ORDER — ONDANSETRON 2 MG/ML
8 INJECTION INTRAMUSCULAR; INTRAVENOUS
OUTPATIENT
Start: 2023-09-11

## 2023-08-31 RX ORDER — SODIUM CHLORIDE 9 MG/ML
INJECTION, SOLUTION INTRAVENOUS CONTINUOUS
OUTPATIENT
Start: 2023-09-11

## 2023-08-31 RX ORDER — EPINEPHRINE 1 MG/ML
0.3 INJECTION, SOLUTION, CONCENTRATE INTRAVENOUS PRN
OUTPATIENT
Start: 2023-09-11

## 2023-08-31 RX ORDER — SODIUM CHLORIDE 0.9 % (FLUSH) 0.9 %
5-40 SYRINGE (ML) INJECTION PRN
OUTPATIENT
Start: 2023-09-11

## 2023-08-31 RX ORDER — DIPHENHYDRAMINE HYDROCHLORIDE 50 MG/ML
50 INJECTION INTRAMUSCULAR; INTRAVENOUS
OUTPATIENT
Start: 2023-09-11

## 2023-08-31 RX ORDER — HEPARIN SODIUM (PORCINE) LOCK FLUSH IV SOLN 100 UNIT/ML 100 UNIT/ML
500 SOLUTION INTRAVENOUS PRN
OUTPATIENT
Start: 2023-09-11

## 2023-08-31 RX ORDER — ALBUTEROL SULFATE 90 UG/1
4 AEROSOL, METERED RESPIRATORY (INHALATION) PRN
OUTPATIENT
Start: 2023-09-11

## 2023-09-01 LAB
BASOPHILS # BLD AUTO: 0 X10E3/UL (ref 0–0.2)
BASOPHILS NFR BLD AUTO: 1 %
EOSINOPHIL # BLD AUTO: 0.2 X10E3/UL (ref 0–0.4)
EOSINOPHIL NFR BLD AUTO: 4 %
ERYTHROCYTE [DISTWIDTH] IN BLOOD BY AUTOMATED COUNT: 16.4 % (ref 11.7–15.4)
HCT VFR BLD AUTO: 36.1 % (ref 34–46.6)
HGB BLD-MCNC: 11.3 G/DL (ref 11.1–15.9)
IMM GRANULOCYTES # BLD AUTO: 0 X10E3/UL (ref 0–0.1)
IMM GRANULOCYTES NFR BLD AUTO: 0 %
LYMPHOCYTES # BLD AUTO: 1 X10E3/UL (ref 0.7–3.1)
LYMPHOCYTES NFR BLD AUTO: 16 %
MCH RBC QN AUTO: 23.7 PG (ref 26.6–33)
MCHC RBC AUTO-ENTMCNC: 31.3 G/DL (ref 31.5–35.7)
MCV RBC AUTO: 76 FL (ref 79–97)
MONOCYTES # BLD AUTO: 0.6 X10E3/UL (ref 0.1–0.9)
MONOCYTES NFR BLD AUTO: 10 %
NEUTROPHILS # BLD AUTO: 4.4 X10E3/UL (ref 1.4–7)
NEUTROPHILS NFR BLD AUTO: 69 %
PLATELET # BLD AUTO: 256 X10E3/UL (ref 150–450)
RBC # BLD AUTO: 4.76 X10E6/UL (ref 3.77–5.28)
WBC # BLD AUTO: 6.3 X10E3/UL (ref 3.4–10.8)

## 2023-09-11 ENCOUNTER — HOSPITAL ENCOUNTER (OUTPATIENT)
Facility: HOSPITAL | Age: 48
Setting detail: INFUSION SERIES
End: 2023-09-11
Payer: MEDICAID

## 2023-09-11 VITALS
RESPIRATION RATE: 18 BRPM | SYSTOLIC BLOOD PRESSURE: 136 MMHG | HEART RATE: 91 BPM | TEMPERATURE: 98.1 F | DIASTOLIC BLOOD PRESSURE: 91 MMHG

## 2023-09-11 DIAGNOSIS — D50.9 IRON DEFICIENCY ANEMIA, UNSPECIFIED IRON DEFICIENCY ANEMIA TYPE: Primary | ICD-10-CM

## 2023-09-11 PROCEDURE — 96374 THER/PROPH/DIAG INJ IV PUSH: CPT

## 2023-09-11 PROCEDURE — 2580000003 HC RX 258: Performed by: NURSE PRACTITIONER

## 2023-09-11 PROCEDURE — 6360000002 HC RX W HCPCS: Performed by: NURSE PRACTITIONER

## 2023-09-11 RX ORDER — DIPHENHYDRAMINE HYDROCHLORIDE 50 MG/ML
50 INJECTION INTRAMUSCULAR; INTRAVENOUS
Status: CANCELLED | OUTPATIENT
Start: 2023-09-18

## 2023-09-11 RX ORDER — SODIUM CHLORIDE 9 MG/ML
5-250 INJECTION, SOLUTION INTRAVENOUS PRN
Status: DISCONTINUED | OUTPATIENT
Start: 2023-09-11 | End: 2023-09-12 | Stop reason: HOSPADM

## 2023-09-11 RX ORDER — ACETAMINOPHEN 325 MG/1
650 TABLET ORAL
Status: CANCELLED | OUTPATIENT
Start: 2023-09-18

## 2023-09-11 RX ORDER — EPINEPHRINE 1 MG/ML
0.3 INJECTION, SOLUTION, CONCENTRATE INTRAVENOUS PRN
Status: CANCELLED | OUTPATIENT
Start: 2023-09-18

## 2023-09-11 RX ORDER — SODIUM CHLORIDE 9 MG/ML
5-250 INJECTION, SOLUTION INTRAVENOUS PRN
Status: CANCELLED | OUTPATIENT
Start: 2023-09-18

## 2023-09-11 RX ORDER — HEPARIN 100 UNIT/ML
500 SYRINGE INTRAVENOUS PRN
Status: CANCELLED | OUTPATIENT
Start: 2023-09-18

## 2023-09-11 RX ORDER — ONDANSETRON 2 MG/ML
8 INJECTION INTRAMUSCULAR; INTRAVENOUS
Status: CANCELLED | OUTPATIENT
Start: 2023-09-18

## 2023-09-11 RX ORDER — SODIUM CHLORIDE 0.9 % (FLUSH) 0.9 %
5-40 SYRINGE (ML) INJECTION PRN
Status: CANCELLED | OUTPATIENT
Start: 2023-09-18

## 2023-09-11 RX ORDER — ALBUTEROL SULFATE 90 UG/1
4 AEROSOL, METERED RESPIRATORY (INHALATION) PRN
Status: CANCELLED | OUTPATIENT
Start: 2023-09-18

## 2023-09-11 RX ORDER — SODIUM CHLORIDE 9 MG/ML
INJECTION, SOLUTION INTRAVENOUS CONTINUOUS
Status: CANCELLED | OUTPATIENT
Start: 2023-09-18

## 2023-09-11 RX ADMIN — IRON SUCROSE 200 MG: 20 INJECTION, SOLUTION INTRAVENOUS at 15:17

## 2023-09-11 ASSESSMENT — PAIN SCALES - GENERAL: PAINLEVEL_OUTOF10: 0

## 2023-09-18 ENCOUNTER — HOSPITAL ENCOUNTER (OUTPATIENT)
Facility: HOSPITAL | Age: 48
Setting detail: INFUSION SERIES
End: 2023-09-18
Payer: MEDICAID

## 2023-09-18 VITALS
HEART RATE: 89 BPM | RESPIRATION RATE: 18 BRPM | DIASTOLIC BLOOD PRESSURE: 83 MMHG | SYSTOLIC BLOOD PRESSURE: 119 MMHG | TEMPERATURE: 98.3 F

## 2023-09-18 DIAGNOSIS — D50.9 IRON DEFICIENCY ANEMIA, UNSPECIFIED IRON DEFICIENCY ANEMIA TYPE: Primary | ICD-10-CM

## 2023-09-18 PROCEDURE — 2580000003 HC RX 258: Performed by: NURSE PRACTITIONER

## 2023-09-18 PROCEDURE — 6360000002 HC RX W HCPCS: Performed by: NURSE PRACTITIONER

## 2023-09-18 PROCEDURE — 96374 THER/PROPH/DIAG INJ IV PUSH: CPT

## 2023-09-18 RX ORDER — EPINEPHRINE 1 MG/ML
0.3 INJECTION, SOLUTION, CONCENTRATE INTRAVENOUS PRN
Status: CANCELLED | OUTPATIENT
Start: 2023-09-25

## 2023-09-18 RX ORDER — SODIUM CHLORIDE 9 MG/ML
5-250 INJECTION, SOLUTION INTRAVENOUS PRN
Status: CANCELLED | OUTPATIENT
Start: 2023-09-25

## 2023-09-18 RX ORDER — ALBUTEROL SULFATE 90 UG/1
4 AEROSOL, METERED RESPIRATORY (INHALATION) PRN
Status: CANCELLED | OUTPATIENT
Start: 2023-09-25

## 2023-09-18 RX ORDER — SODIUM CHLORIDE 0.9 % (FLUSH) 0.9 %
5-40 SYRINGE (ML) INJECTION PRN
Status: CANCELLED | OUTPATIENT
Start: 2023-09-25

## 2023-09-18 RX ORDER — HEPARIN 100 UNIT/ML
500 SYRINGE INTRAVENOUS PRN
Status: CANCELLED | OUTPATIENT
Start: 2023-09-25

## 2023-09-18 RX ORDER — ACETAMINOPHEN 325 MG/1
650 TABLET ORAL
Status: CANCELLED | OUTPATIENT
Start: 2023-09-25

## 2023-09-18 RX ORDER — ONDANSETRON 2 MG/ML
8 INJECTION INTRAMUSCULAR; INTRAVENOUS
Status: CANCELLED | OUTPATIENT
Start: 2023-09-25

## 2023-09-18 RX ORDER — SODIUM CHLORIDE 9 MG/ML
5-250 INJECTION, SOLUTION INTRAVENOUS PRN
Status: DISCONTINUED | OUTPATIENT
Start: 2023-09-18 | End: 2023-09-19 | Stop reason: HOSPADM

## 2023-09-18 RX ORDER — SODIUM CHLORIDE 9 MG/ML
INJECTION, SOLUTION INTRAVENOUS CONTINUOUS
Status: CANCELLED | OUTPATIENT
Start: 2023-09-25

## 2023-09-18 RX ORDER — DIPHENHYDRAMINE HYDROCHLORIDE 50 MG/ML
50 INJECTION INTRAMUSCULAR; INTRAVENOUS
Status: CANCELLED | OUTPATIENT
Start: 2023-09-25

## 2023-09-18 RX ADMIN — IRON SUCROSE 200 MG: 20 INJECTION, SOLUTION INTRAVENOUS at 14:53

## 2023-09-18 ASSESSMENT — PAIN SCALES - GENERAL: PAINLEVEL_OUTOF10: 0

## 2023-09-25 ENCOUNTER — HOSPITAL ENCOUNTER (OUTPATIENT)
Facility: HOSPITAL | Age: 48
Setting detail: INFUSION SERIES
End: 2023-09-25
Payer: MEDICAID

## 2023-09-26 ENCOUNTER — HOSPITAL ENCOUNTER (OUTPATIENT)
Facility: HOSPITAL | Age: 48
Setting detail: INFUSION SERIES
End: 2023-09-26
Payer: MEDICAID

## 2023-09-26 VITALS
OXYGEN SATURATION: 100 % | BODY MASS INDEX: 40.15 KG/M2 | SYSTOLIC BLOOD PRESSURE: 120 MMHG | RESPIRATION RATE: 16 BRPM | TEMPERATURE: 98 F | HEIGHT: 67 IN | HEART RATE: 84 BPM | DIASTOLIC BLOOD PRESSURE: 78 MMHG | WEIGHT: 255.8 LBS

## 2023-09-26 DIAGNOSIS — D50.9 IRON DEFICIENCY ANEMIA, UNSPECIFIED IRON DEFICIENCY ANEMIA TYPE: Primary | ICD-10-CM

## 2023-09-26 PROCEDURE — 6360000002 HC RX W HCPCS: Performed by: NURSE PRACTITIONER

## 2023-09-26 PROCEDURE — 2580000003 HC RX 258: Performed by: NURSE PRACTITIONER

## 2023-09-26 PROCEDURE — 96374 THER/PROPH/DIAG INJ IV PUSH: CPT

## 2023-09-26 RX ORDER — DIPHENHYDRAMINE HYDROCHLORIDE 50 MG/ML
50 INJECTION INTRAMUSCULAR; INTRAVENOUS
Status: CANCELLED | OUTPATIENT
Start: 2023-10-02

## 2023-09-26 RX ORDER — EPINEPHRINE 1 MG/ML
0.3 INJECTION, SOLUTION, CONCENTRATE INTRAVENOUS PRN
Status: CANCELLED | OUTPATIENT
Start: 2023-10-02

## 2023-09-26 RX ORDER — SODIUM CHLORIDE 9 MG/ML
5-250 INJECTION, SOLUTION INTRAVENOUS PRN
Status: CANCELLED | OUTPATIENT
Start: 2023-10-02

## 2023-09-26 RX ORDER — SODIUM CHLORIDE 0.9 % (FLUSH) 0.9 %
5-40 SYRINGE (ML) INJECTION PRN
Status: CANCELLED | OUTPATIENT
Start: 2023-10-02

## 2023-09-26 RX ORDER — SODIUM CHLORIDE 9 MG/ML
5-250 INJECTION, SOLUTION INTRAVENOUS PRN
Status: DISCONTINUED | OUTPATIENT
Start: 2023-09-26 | End: 2023-09-27 | Stop reason: HOSPADM

## 2023-09-26 RX ORDER — HEPARIN 100 UNIT/ML
500 SYRINGE INTRAVENOUS PRN
Status: CANCELLED | OUTPATIENT
Start: 2023-10-02

## 2023-09-26 RX ORDER — SODIUM CHLORIDE 9 MG/ML
INJECTION, SOLUTION INTRAVENOUS CONTINUOUS
Status: CANCELLED | OUTPATIENT
Start: 2023-10-02

## 2023-09-26 RX ORDER — ALBUTEROL SULFATE 90 UG/1
4 AEROSOL, METERED RESPIRATORY (INHALATION) PRN
Status: CANCELLED | OUTPATIENT
Start: 2023-10-02

## 2023-09-26 RX ORDER — ACETAMINOPHEN 325 MG/1
650 TABLET ORAL
Status: CANCELLED | OUTPATIENT
Start: 2023-10-02

## 2023-09-26 RX ORDER — ONDANSETRON 2 MG/ML
8 INJECTION INTRAMUSCULAR; INTRAVENOUS
Status: CANCELLED | OUTPATIENT
Start: 2023-10-02

## 2023-09-26 RX ADMIN — SODIUM CHLORIDE 25 ML/HR: 9 INJECTION, SOLUTION INTRAVENOUS at 12:04

## 2023-09-26 RX ADMIN — IRON SUCROSE 200 MG: 20 INJECTION, SOLUTION INTRAVENOUS at 12:05

## 2023-09-26 ASSESSMENT — PAIN SCALES - GENERAL: PAINLEVEL_OUTOF10: 0

## 2023-10-02 ENCOUNTER — HOSPITAL ENCOUNTER (OUTPATIENT)
Facility: HOSPITAL | Age: 48
Setting detail: INFUSION SERIES
End: 2023-10-02
Payer: MEDICAID

## 2023-10-02 VITALS
BODY MASS INDEX: 40.12 KG/M2 | SYSTOLIC BLOOD PRESSURE: 123 MMHG | TEMPERATURE: 98.2 F | HEIGHT: 67 IN | DIASTOLIC BLOOD PRESSURE: 79 MMHG | WEIGHT: 255.6 LBS | HEART RATE: 92 BPM | OXYGEN SATURATION: 97 % | RESPIRATION RATE: 16 BRPM

## 2023-10-02 DIAGNOSIS — D50.9 IRON DEFICIENCY ANEMIA, UNSPECIFIED IRON DEFICIENCY ANEMIA TYPE: Primary | ICD-10-CM

## 2023-10-02 PROCEDURE — 96374 THER/PROPH/DIAG INJ IV PUSH: CPT

## 2023-10-02 PROCEDURE — 6360000002 HC RX W HCPCS: Performed by: NURSE PRACTITIONER

## 2023-10-02 PROCEDURE — 2580000003 HC RX 258: Performed by: NURSE PRACTITIONER

## 2023-10-02 RX ORDER — SODIUM CHLORIDE 9 MG/ML
5-250 INJECTION, SOLUTION INTRAVENOUS PRN
Status: DISCONTINUED | OUTPATIENT
Start: 2023-10-02 | End: 2023-10-03 | Stop reason: HOSPADM

## 2023-10-02 RX ORDER — ACETAMINOPHEN 325 MG/1
650 TABLET ORAL
Status: CANCELLED | OUTPATIENT
Start: 2023-10-09

## 2023-10-02 RX ORDER — ALBUTEROL SULFATE 90 UG/1
4 AEROSOL, METERED RESPIRATORY (INHALATION) PRN
Status: CANCELLED | OUTPATIENT
Start: 2023-10-09

## 2023-10-02 RX ORDER — SODIUM CHLORIDE 9 MG/ML
INJECTION, SOLUTION INTRAVENOUS CONTINUOUS
Status: CANCELLED | OUTPATIENT
Start: 2023-10-09

## 2023-10-02 RX ORDER — SODIUM CHLORIDE 0.9 % (FLUSH) 0.9 %
5-40 SYRINGE (ML) INJECTION PRN
Status: CANCELLED | OUTPATIENT
Start: 2023-10-09

## 2023-10-02 RX ORDER — DIPHENHYDRAMINE HYDROCHLORIDE 50 MG/ML
50 INJECTION INTRAMUSCULAR; INTRAVENOUS
Status: CANCELLED | OUTPATIENT
Start: 2023-10-09

## 2023-10-02 RX ORDER — EPINEPHRINE 1 MG/ML
0.3 INJECTION, SOLUTION, CONCENTRATE INTRAVENOUS PRN
Status: CANCELLED | OUTPATIENT
Start: 2023-10-09

## 2023-10-02 RX ORDER — ONDANSETRON 2 MG/ML
8 INJECTION INTRAMUSCULAR; INTRAVENOUS
Status: CANCELLED | OUTPATIENT
Start: 2023-10-09

## 2023-10-02 RX ORDER — HEPARIN 100 UNIT/ML
500 SYRINGE INTRAVENOUS PRN
Status: CANCELLED | OUTPATIENT
Start: 2023-10-09

## 2023-10-02 RX ORDER — SODIUM CHLORIDE 9 MG/ML
5-250 INJECTION, SOLUTION INTRAVENOUS PRN
Status: CANCELLED | OUTPATIENT
Start: 2023-10-09

## 2023-10-02 RX ADMIN — IRON SUCROSE 200 MG: 20 INJECTION, SOLUTION INTRAVENOUS at 15:25

## 2023-10-02 RX ADMIN — SODIUM CHLORIDE 25 ML/HR: 9 INJECTION, SOLUTION INTRAVENOUS at 15:22

## 2023-10-02 ASSESSMENT — PAIN SCALES - GENERAL: PAINLEVEL_OUTOF10: 0

## 2023-10-09 ENCOUNTER — HOSPITAL ENCOUNTER (OUTPATIENT)
Facility: HOSPITAL | Age: 48
Setting detail: INFUSION SERIES
Discharge: HOME | End: 2023-10-09

## 2023-10-12 ENCOUNTER — TELEPHONE (OUTPATIENT)
Age: 48
End: 2023-10-12

## 2023-10-12 NOTE — TELEPHONE ENCOUNTER
Pt called back to inform that 1:30 for infusion appointment tomorrow, 10/13/ is okay.     CB: [phone]

## 2023-10-13 ENCOUNTER — HOSPITAL ENCOUNTER (OUTPATIENT)
Facility: HOSPITAL | Age: 48
Setting detail: INFUSION SERIES
End: 2023-10-13
Payer: MEDICAID

## 2023-10-13 VITALS
TEMPERATURE: 98.9 F | DIASTOLIC BLOOD PRESSURE: 81 MMHG | SYSTOLIC BLOOD PRESSURE: 132 MMHG | RESPIRATION RATE: 16 BRPM | HEART RATE: 101 BPM | OXYGEN SATURATION: 98 %

## 2023-10-13 DIAGNOSIS — D50.9 IRON DEFICIENCY ANEMIA, UNSPECIFIED IRON DEFICIENCY ANEMIA TYPE: Primary | ICD-10-CM

## 2023-10-13 PROCEDURE — 6360000002 HC RX W HCPCS: Performed by: NURSE PRACTITIONER

## 2023-10-13 PROCEDURE — 2580000003 HC RX 258: Performed by: NURSE PRACTITIONER

## 2023-10-13 PROCEDURE — 96374 THER/PROPH/DIAG INJ IV PUSH: CPT

## 2023-10-13 RX ORDER — SODIUM CHLORIDE 9 MG/ML
5-250 INJECTION, SOLUTION INTRAVENOUS PRN
Status: DISCONTINUED | OUTPATIENT
Start: 2023-10-13 | End: 2023-10-14 | Stop reason: HOSPADM

## 2023-10-13 RX ORDER — DIPHENHYDRAMINE HYDROCHLORIDE 50 MG/ML
50 INJECTION INTRAMUSCULAR; INTRAVENOUS
OUTPATIENT
Start: 2023-10-13

## 2023-10-13 RX ORDER — EPINEPHRINE 1 MG/ML
0.3 INJECTION, SOLUTION, CONCENTRATE INTRAVENOUS PRN
OUTPATIENT
Start: 2023-10-13

## 2023-10-13 RX ORDER — HEPARIN 100 UNIT/ML
500 SYRINGE INTRAVENOUS PRN
OUTPATIENT
Start: 2023-10-13

## 2023-10-13 RX ORDER — ACETAMINOPHEN 325 MG/1
650 TABLET ORAL
OUTPATIENT
Start: 2023-10-13

## 2023-10-13 RX ORDER — ONDANSETRON 2 MG/ML
8 INJECTION INTRAMUSCULAR; INTRAVENOUS
OUTPATIENT
Start: 2023-10-13

## 2023-10-13 RX ORDER — SODIUM CHLORIDE 9 MG/ML
5-250 INJECTION, SOLUTION INTRAVENOUS PRN
OUTPATIENT
Start: 2023-10-13

## 2023-10-13 RX ORDER — SODIUM CHLORIDE 0.9 % (FLUSH) 0.9 %
5-40 SYRINGE (ML) INJECTION PRN
Status: DISCONTINUED | OUTPATIENT
Start: 2023-10-13 | End: 2023-10-14 | Stop reason: HOSPADM

## 2023-10-13 RX ORDER — SODIUM CHLORIDE 0.9 % (FLUSH) 0.9 %
5-40 SYRINGE (ML) INJECTION PRN
OUTPATIENT
Start: 2023-10-13

## 2023-10-13 RX ORDER — SODIUM CHLORIDE 9 MG/ML
INJECTION, SOLUTION INTRAVENOUS CONTINUOUS
OUTPATIENT
Start: 2023-10-13

## 2023-10-13 RX ORDER — ALBUTEROL SULFATE 90 UG/1
4 AEROSOL, METERED RESPIRATORY (INHALATION) PRN
OUTPATIENT
Start: 2023-10-13

## 2023-10-13 RX ADMIN — IRON SUCROSE 200 MG: 20 INJECTION, SOLUTION INTRAVENOUS at 13:55

## 2023-10-13 RX ADMIN — SODIUM CHLORIDE 25 ML/HR: 9 INJECTION, SOLUTION INTRAVENOUS at 13:53

## 2023-10-13 RX ADMIN — Medication 10 ML: at 13:50

## 2023-10-13 ASSESSMENT — PAIN SCALES - GENERAL: PAINLEVEL_OUTOF10: 0

## 2023-11-02 DIAGNOSIS — E03.9 ACQUIRED HYPOTHYROIDISM: ICD-10-CM

## 2023-11-06 RX ORDER — LEVOTHYROXINE SODIUM 0.12 MG/1
125 TABLET ORAL DAILY
Qty: 90 TABLET | Refills: 1 | Status: SHIPPED | OUTPATIENT
Start: 2023-11-06

## 2023-12-01 NOTE — TELEPHONE ENCOUNTER
[phone]    ASKING FOR A PAP. Your child has been diagnosed with strep throat, his/her  rapid strep test was positive. Treat with antibiotics and no activities until 24 hours of antibiotics and fever resolution. They are considered contagious for 24 hours after starting antibiotic. They can take ibuprofen and acetaminophen for comfort and should push fluids Take small glass of water and add 1 teaspoon of salt for saline gargles will help with throat pain. switch out toothbrush after being on antibiotic for 24 hours.

## 2024-02-14 ENCOUNTER — TELEPHONE (OUTPATIENT)
Age: 49
End: 2024-02-14

## 2024-02-14 NOTE — TELEPHONE ENCOUNTER
[facility] [place] at [facility]  [phone]    Phone call placed to pt to remind pt to have labs drawn prior to her follow up appointment with .  left for patient.

## 2024-02-26 DIAGNOSIS — D64.9 ANEMIA, UNSPECIFIED TYPE: ICD-10-CM

## 2024-02-29 ENCOUNTER — CLINICAL DOCUMENTATION (OUTPATIENT)
Age: 49
End: 2024-02-29

## 2024-02-29 NOTE — PROGRESS NOTES
[facility] [place] at [facility]  [phone]    The patient was a NO SHOW for appointment with me today.

## 2024-03-01 ENCOUNTER — TELEPHONE (OUTPATIENT)
Age: 49
End: 2024-03-01

## 2024-03-01 NOTE — TELEPHONE ENCOUNTER
Attempted to call patient to reschedule missed appt. No answer; unable to leave vm due to mailbox being full.

## 2024-03-04 ENCOUNTER — TELEPHONE (OUTPATIENT)
Age: 49
End: 2024-03-04

## 2024-07-23 DIAGNOSIS — E03.9 ACQUIRED HYPOTHYROIDISM: ICD-10-CM

## 2024-07-23 RX ORDER — LEVOTHYROXINE SODIUM 125 UG/1
125 TABLET ORAL DAILY
Qty: 90 TABLET | Refills: 1 | OUTPATIENT
Start: 2024-07-23

## 2024-07-23 RX ORDER — LEVOTHYROXINE SODIUM 0.12 MG/1
125 TABLET ORAL DAILY
Qty: 30 TABLET | Refills: 1 | Status: SHIPPED | OUTPATIENT
Start: 2024-07-23

## 2024-07-26 SDOH — ECONOMIC STABILITY: FOOD INSECURITY: WITHIN THE PAST 12 MONTHS, YOU WORRIED THAT YOUR FOOD WOULD RUN OUT BEFORE YOU GOT MONEY TO BUY MORE.: SOMETIMES TRUE

## 2024-07-26 SDOH — ECONOMIC STABILITY: INCOME INSECURITY: HOW HARD IS IT FOR YOU TO PAY FOR THE VERY BASICS LIKE FOOD, HOUSING, MEDICAL CARE, AND HEATING?: SOMEWHAT HARD

## 2024-07-26 SDOH — ECONOMIC STABILITY: FOOD INSECURITY: WITHIN THE PAST 12 MONTHS, THE FOOD YOU BOUGHT JUST DIDN'T LAST AND YOU DIDN'T HAVE MONEY TO GET MORE.: SOMETIMES TRUE

## 2024-07-26 SDOH — ECONOMIC STABILITY: TRANSPORTATION INSECURITY
IN THE PAST 12 MONTHS, HAS LACK OF TRANSPORTATION KEPT YOU FROM MEETINGS, WORK, OR FROM GETTING THINGS NEEDED FOR DAILY LIVING?: NO

## 2024-07-29 ENCOUNTER — OFFICE VISIT (OUTPATIENT)
Age: 49
End: 2024-07-29
Payer: MEDICAID

## 2024-07-29 VITALS
BODY MASS INDEX: 40.81 KG/M2 | SYSTOLIC BLOOD PRESSURE: 141 MMHG | OXYGEN SATURATION: 97 % | HEIGHT: 67 IN | DIASTOLIC BLOOD PRESSURE: 85 MMHG | RESPIRATION RATE: 18 BRPM | HEART RATE: 91 BPM | WEIGHT: 260 LBS

## 2024-07-29 DIAGNOSIS — E03.9 ACQUIRED HYPOTHYROIDISM: Primary | ICD-10-CM

## 2024-07-29 DIAGNOSIS — Z12.31 ENCOUNTER FOR SCREENING MAMMOGRAM FOR MALIGNANT NEOPLASM OF BREAST: ICD-10-CM

## 2024-07-29 DIAGNOSIS — Z12.11 SCREEN FOR COLON CANCER: ICD-10-CM

## 2024-07-29 PROCEDURE — 99213 OFFICE O/P EST LOW 20 MIN: CPT

## 2024-07-29 RX ORDER — LEVOTHYROXINE SODIUM 0.12 MG/1
125 TABLET ORAL DAILY
Qty: 90 TABLET | Refills: 3 | Status: SHIPPED | OUTPATIENT
Start: 2024-07-29

## 2024-07-29 NOTE — PROGRESS NOTES
[address] [place] [address]   Office [phone], Fax [phone]      Chief Complaint:     Chief Complaint   Patient presents with    Thyroid Problem    Medication Refill       Subjective:   HPI:  Margarita Infante is a 48 y.o. female that presents for the above complaint.     Hypothyroidism:   - Has been doing well on Levothyrozine 125 mcg QD. Denies side effects.     Other health maintenance:    - Due for colonoscopy, mammogram and pap     Review of Systems   All other systems reviewed and are negative.    Health Maintenance:  Health Maintenance Due   Topic Date Due    Hepatitis B vaccine (1 of 3 - 3-dose series) Never done    COVID-19 Vaccine (1) Never done    Depression Screen  Never done    Breast cancer screen  Never done    Colorectal Cancer Screen  Never done        Current Medications  Current medications include:   Current Outpatient Medications   Medication Sig    levothyroxine (SYNTHROID) 125 MCG tablet Take 1 tablet by mouth Daily    Multiple Vitamin (MULTIVITAMIN PO) Take by mouth     No current facility-administered medications for this visit.       Allergies  Allergies   Allergen Reactions    Hydrocodone Shortness Of Breath and Dizziness or Vertigo    Omeprazole Anaphylaxis and Hives    Adhesive Tape Rash       Past Medical History  Past Medical History:   Diagnosis Date    Abnormal Pap smear     colposcopy, wnl since    Asthma     stress induced(last in january)    Group B Streptococcus carrier, +RV culture, currently pregnant 18    Hypothyroidism 2017    Obesity     Polycystic ovarian syndrome     Thyroid disease     Trauma     broken R ankle, R foot, L arm       Past Surgical History   Past Surgical History:   Procedure Laterality Date    CERVIX BIOPSY  2016     DELIVERY ONLY  14     SECTION  2012 & 2016    CHOLECYSTECTOMY      COLPOSCOPY  3/17/16    GYN      Csection X2    HEENT      DENTAL       Family History  Family History   Problem

## 2024-07-29 NOTE — PROGRESS NOTES
Room 12    Identified pt with two pt identifiers(name and ). Reviewed record in preparation for visit and have obtained necessary documentation.  Chief Complaint   Patient presents with    Thyroid Problem    Medication Refill        Health Maintenance Due   Topic    Hepatitis B vaccine (1 of 3 - 3-dose series)    COVID-19 Vaccine (1)    Depression Screen     Breast cancer screen     Colorectal Cancer Screen        Vitals:    24 0804   BP: (!) 141/85   Site: Right Upper Arm   Position: Sitting   Cuff Size: Medium Adult   Pulse: 91   Resp: 18   SpO2: 97%   Weight: 117.9 kg (260 lb)   Height: 1.702 m (5' 7\")         \"Have you been to the ER, urgent care clinic since your last visit?  Hospitalized since your last visit?\"    NO    “Have you seen or consulted any other health care providers outside of [facility] since your last visit?”    NO    Have you had a mammogram?”   NO    No breast cancer screening on file         “Have you had a colorectal cancer screening such as a colonoscopy/FIT/Cologuard?    No    No colonoscopy on file  No cologuard on file  No FIT/FOBT on file   No flexible sigmoidoscopy on file         Click Here for Release of Records Request     This patient is accompanied in the office by her Self.  I have received verbal consent from Margarita Infante to discuss any/all medical information while they are present in the room.

## 2024-07-31 DIAGNOSIS — E03.9 ACQUIRED HYPOTHYROIDISM: Primary | ICD-10-CM

## 2024-07-31 LAB — TSH SERPL DL<=0.005 MIU/L-ACNC: 9.87 UIU/ML (ref 0.45–4.5)

## 2024-07-31 NOTE — RESULT ENCOUNTER NOTE
TSH increased to 9.870 from 2.020 1 year prior.   Current regimen is 125 mcg daily or 875 mcg weekly   1.6 mcg/kg/day = 65.15 mcg daily.   Recommend increase with 1 extra 125 mcg pill weekly (~18 mcg daily increase)   Called and d/w patient  Lab appt in ~ 6 weeks. Will place TSH order.

## 2024-08-06 ENCOUNTER — HOSPITAL ENCOUNTER (OUTPATIENT)
Facility: HOSPITAL | Age: 49
Discharge: HOME | End: 2024-08-09
Payer: MEDICAID

## 2024-08-06 VITALS — WEIGHT: 260 LBS | BODY MASS INDEX: 40.81 KG/M2 | HEIGHT: 67 IN

## 2024-08-06 DIAGNOSIS — Z12.31 ENCOUNTER FOR SCREENING MAMMOGRAM FOR MALIGNANT NEOPLASM OF BREAST: ICD-10-CM

## 2024-08-06 PROCEDURE — 77067 SCR MAMMO BI INCL CAD: CPT

## 2024-09-12 ENCOUNTER — LAB (OUTPATIENT)
Age: 49
End: 2024-09-12

## 2024-09-12 DIAGNOSIS — E03.9 ACQUIRED HYPOTHYROIDISM: ICD-10-CM

## 2024-09-13 DIAGNOSIS — E03.9 ACQUIRED HYPOTHYROIDISM: Primary | ICD-10-CM

## 2024-09-13 LAB — TSH SERPL DL<=0.005 MIU/L-ACNC: 0.13 UIU/ML (ref 0.45–4.5)

## 2024-09-13 RX ORDER — LEVOTHYROXINE SODIUM 137 UG/1
137 TABLET ORAL DAILY
Qty: 90 TABLET | Refills: 0 | Status: SHIPPED | OUTPATIENT
Start: 2024-09-13

## 2024-10-09 ENCOUNTER — ANESTHESIA EVENT (OUTPATIENT)
Facility: HOSPITAL | Age: 49
End: 2024-10-09
Payer: MEDICAID

## 2024-10-09 ENCOUNTER — ANESTHESIA (OUTPATIENT)
Facility: HOSPITAL | Age: 49
End: 2024-10-09
Payer: MEDICAID

## 2024-10-09 ENCOUNTER — HOSPITAL ENCOUNTER (OUTPATIENT)
Facility: HOSPITAL | Age: 49
Setting detail: OUTPATIENT SURGERY
Discharge: HOME | End: 2024-10-09
Attending: INTERNAL MEDICINE | Admitting: INTERNAL MEDICINE
Payer: MEDICAID

## 2024-10-09 VITALS
RESPIRATION RATE: 11 BRPM | OXYGEN SATURATION: 98 % | HEART RATE: 86 BPM | SYSTOLIC BLOOD PRESSURE: 116 MMHG | HEIGHT: 67 IN | DIASTOLIC BLOOD PRESSURE: 92 MMHG | TEMPERATURE: 98.1 F | BODY MASS INDEX: 39.24 KG/M2 | WEIGHT: 250 LBS

## 2024-10-09 LAB — HCG UR QL: NEGATIVE

## 2024-10-09 PROCEDURE — 3600007502: Performed by: INTERNAL MEDICINE

## 2024-10-09 PROCEDURE — 81025 URINE PREGNANCY TEST: CPT

## 2024-10-09 PROCEDURE — 7100000010 HC PHASE II RECOVERY - FIRST 15 MIN: Performed by: INTERNAL MEDICINE

## 2024-10-09 PROCEDURE — 3700000000 HC ANESTHESIA ATTENDED CARE: Performed by: INTERNAL MEDICINE

## 2024-10-09 PROCEDURE — 88305 TISSUE EXAM BY PATHOLOGIST: CPT

## 2024-10-09 PROCEDURE — 2709999900 HC NON-CHARGEABLE SUPPLY: Performed by: INTERNAL MEDICINE

## 2024-10-09 PROCEDURE — 3600007512: Performed by: INTERNAL MEDICINE

## 2024-10-09 PROCEDURE — 7100000011 HC PHASE II RECOVERY - ADDTL 15 MIN: Performed by: INTERNAL MEDICINE

## 2024-10-09 PROCEDURE — 3700000001 HC ADD 15 MINUTES (ANESTHESIA): Performed by: INTERNAL MEDICINE

## 2024-10-09 PROCEDURE — 6360000002 HC RX W HCPCS: Performed by: NURSE ANESTHETIST, CERTIFIED REGISTERED

## 2024-10-09 PROCEDURE — 2500000003 HC RX 250 WO HCPCS: Performed by: NURSE ANESTHETIST, CERTIFIED REGISTERED

## 2024-10-09 PROCEDURE — 2580000003 HC RX 258: Performed by: INTERNAL MEDICINE

## 2024-10-09 RX ORDER — LIDOCAINE HYDROCHLORIDE 20 MG/ML
INJECTION, SOLUTION EPIDURAL; INFILTRATION; INTRACAUDAL; PERINEURAL
Status: DISCONTINUED | OUTPATIENT
Start: 2024-10-09 | End: 2024-10-09 | Stop reason: SDUPTHER

## 2024-10-09 RX ORDER — SODIUM CHLORIDE 0.9 % (FLUSH) 0.9 %
5-40 SYRINGE (ML) INJECTION PRN
Status: DISCONTINUED | OUTPATIENT
Start: 2024-10-09 | End: 2024-10-09 | Stop reason: HOSPADM

## 2024-10-09 RX ORDER — SODIUM CHLORIDE 9 MG/ML
25 INJECTION, SOLUTION INTRAVENOUS PRN
Status: DISCONTINUED | OUTPATIENT
Start: 2024-10-09 | End: 2024-10-09 | Stop reason: HOSPADM

## 2024-10-09 RX ORDER — SODIUM CHLORIDE 0.9 % (FLUSH) 0.9 %
5-40 SYRINGE (ML) INJECTION EVERY 12 HOURS SCHEDULED
Status: DISCONTINUED | OUTPATIENT
Start: 2024-10-09 | End: 2024-10-09 | Stop reason: HOSPADM

## 2024-10-09 RX ORDER — SODIUM CHLORIDE 9 MG/ML
INJECTION, SOLUTION INTRAVENOUS CONTINUOUS
Status: DISCONTINUED | OUTPATIENT
Start: 2024-10-09 | End: 2024-10-09 | Stop reason: HOSPADM

## 2024-10-09 RX ADMIN — PROPOFOL 40 MG: 10 INJECTION, EMULSION INTRAVENOUS at 15:12

## 2024-10-09 RX ADMIN — LIDOCAINE HYDROCHLORIDE 25 MG: 20 INJECTION, SOLUTION EPIDURAL; INFILTRATION; INTRACAUDAL; PERINEURAL at 15:02

## 2024-10-09 RX ADMIN — SODIUM CHLORIDE, PRESERVATIVE FREE 20 ML: 5 INJECTION INTRAVENOUS at 15:18

## 2024-10-09 RX ADMIN — PROPOFOL 40 MG: 10 INJECTION, EMULSION INTRAVENOUS at 15:04

## 2024-10-09 RX ADMIN — PROPOFOL 70 MG: 10 INJECTION, EMULSION INTRAVENOUS at 15:10

## 2024-10-09 RX ADMIN — PROPOFOL 50 MG: 10 INJECTION, EMULSION INTRAVENOUS at 15:07

## 2024-10-09 RX ADMIN — PROPOFOL 120 MG: 10 INJECTION, EMULSION INTRAVENOUS at 15:02

## 2024-10-09 RX ADMIN — PROPOFOL 40 MG: 10 INJECTION, EMULSION INTRAVENOUS at 15:03

## 2024-10-09 RX ADMIN — PROPOFOL 40 MG: 10 INJECTION, EMULSION INTRAVENOUS at 15:14

## 2024-10-09 NOTE — OP NOTE
[facility] [address] [address]      Colonoscopy Operative Report    Margarita Infante  [id number]  1975      Procedure Type:   Colonoscopy with polypectomy (hot biopsy)     Indications:    Family history of coloretal adenoma  (screening only)       Pre-operative Diagnosis: see indication above    Post-operative Diagnosis:  See findings below    :  Shaneka Medina MD    Surgical Assistant: Circulator: Crow Contreras RN  Endoscopy Technician: Paulette Reid    Implants:  None    Referring Provider: Bryon Yip MD      Sedation:  MAC anesthesia Propofol      Procedure Details:  After informed consent was obtained with all risks and benefits of procedure explained and preoperative exam completed, the patient was taken to the endoscopy suite and placed in the left lateral decubitus position.  Upon sequential sedation as per above, a digital rectal exam was performed demonstrating internal hemorrhoids.  The Olympus videocolonoscope  was inserted in the rectum and carefully advanced to the cecum, which was identified by the ileocecal valve and appendiceal orifice.  The cecum was identified by the ileocecal valve and appendiceal orifice.  The quality of preparation was good.  The colonoscope was slowly withdrawn with careful evaluation between folds. Retroflexion in the rectum was completed .     Findings:   Rectum: normal  Sigmoid: 1 cm sessile polyp, removed by hot biopsies    Descending Colon: normal  Transverse Colon: 2 sessile polyps around 1 cm in size each, removed by hot biopsies    Ascending Colon: normal  Cecum: normal        Specimen Removed:   as above     Complications: None.     EBL:  None.    Impression:     see findings     Recommendations: --Await pathology.      Recommendation for next colonscopy in 3 versus 5 years    Signed By: Shaneka Medina MD     10/9/2024  3:19 PM

## 2024-10-09 NOTE — ANESTHESIA POSTPROCEDURE EVALUATION
Department of Anesthesiology  Postprocedure Note    Patient: Margarita Infante  MRN: [id number]  YOB: 1975  Date of evaluation: 10/9/2024    Procedure Summary       Date: 10/09/24 Room / Location: [facility] 03 / [facility] ENDOSCOPY    Anesthesia Start: 1500 Anesthesia Stop: 1519    Procedure: COLONOSCOPY Diagnosis:       Family history of colonic polyps      (Family history of colonic polyps [Z83.719])    Surgeons: Shaneka Medina MD Responsible Provider: Nichole Schmidt DO    Anesthesia Type: MAC ASA Status: 3            Anesthesia Type: MAC    Ledy Phase I: Ledy Score: 10    Ledy Phase II:      Anesthesia Post Evaluation    Patient location during evaluation: PACU  Level of consciousness: awake  Airway patency: patent  Nausea & Vomiting: no nausea  Cardiovascular status: hemodynamically stable  Respiratory status: acceptable  Hydration status: stable  Multimodal analgesia pain management approach  Pain management: adequate    No notable events documented.

## 2024-10-09 NOTE — ANESTHESIA PRE PROCEDURE
Department of Anesthesiology  Preprocedure Note       Name:  Margarita Infante   Age:  48 y.o.  :  1975                                          MRN:  [id number]         Date:  10/9/2024      Surgeon: Surgeon(s):  Shaneka Medina MD    Procedure: Procedure(s):  COLONOSCOPY    Medications prior to admission:   Prior to Admission medications    Medication Sig Start Date End Date Taking? Authorizing Provider   levothyroxine (SYNTHROID) 137 MCG tablet Take 1 tablet by mouth daily 24  Yes Bryon Yip MD   Multiple Vitamin (MULTIVITAMIN PO) Take by mouth   Yes Automatic Reconciliation, Ar       Current medications:    Current Facility-Administered Medications   Medication Dose Route Frequency Provider Last Rate Last Admin    0.9 % sodium chloride infusion   IntraVENous Continuous Shaneka Medina MD        sodium chloride flush 0.9 % injection 5-40 mL  5-40 mL IntraVENous 2 times per day Shaneka Medina MD        sodium chloride flush 0.9 % injection 5-40 mL  5-40 mL IntraVENous PRN Shaneka Medina MD        0.9 % sodium chloride infusion  25 mL IntraVENous PRN Shaneka Medina MD           Allergies:    Allergies   Allergen Reactions    Hydrocodone Shortness Of Breath and Dizziness or Vertigo    Omeprazole Anaphylaxis and Hives    Adhesive Tape Rash       Problem List:    Patient Active Problem List   Diagnosis Code    Abnormal Pap smear of cervix R87.619    Cervical dysplasia N87.9    Family history of thyroid disease in mother Z83.49    Anxiety F41.9    PCOS (polycystic ovarian syndrome) E28.2    Severe obesity E66.01    Change in multiple nevi D22.9    Anovulatory N97.0    Endometrial hyperplasia N85.00    Acquired hypothyroidism E03.9    Obesity (BMI 30-39.9) E66.9    Iron deficiency anemia D50.9       Past Medical History:        Diagnosis Date    Abnormal Pap smear     colposcopy, wnl since    Asthma     stress induced(last in january)    Group B Streptococcus carrier, +RV

## 2024-10-09 NOTE — PROGRESS NOTES
Verified patient name and date of birth, scheduled procedure, and informed consent. Reviewed general discharge instructions and  information.  Assessed patient. Awake, alert, and oriented per baseline. Vital signs stable (see vital sign flowsheet). Respiratory status within defined limits, abdomen soft and non tender. Skin with in defined limits.     Initial RN admission and assessment performed and documented in Endoscopy navigator.     Patient evaluated by anesthesia in pre-procedure holding.     All procedural vital signs, airway assessment, and level of consciousness information monitored and recorded by anesthesia staff on the anesthesia record.     Report received from CRNA post procedure.  Patient transported to recovery area by RN.    Endoscopy post procedure time out was performed and specimens were verified with physician.    Endoscope was pre-cleaned at bedside immediately following procedure by Paulette.

## 2024-10-09 NOTE — H&P
[facility]  [address] [place] [id number]      History and Physical       NAME:  Margarita Infante   :   1975   MRN:   [id number]             History of Present Illness:  Patient is a 48 y.o. who is seen for screening colonoscopy .     PMH:  Past Medical History:   Diagnosis Date    Abnormal Pap smear     colposcopy, wnl since    Asthma     stress induced(last in january)    Group B Streptococcus carrier, +RV culture, currently pregnant 18    Hypothyroidism 2017    Obesity     Polycystic ovarian syndrome     Thyroid disease     Trauma     broken R ankle, R foot, L arm       PSH:  Past Surgical History:   Procedure Laterality Date    CERVIX BIOPSY  2016     DELIVERY ONLY  14     SECTION  2012 & 2016    CHOLECYSTECTOMY      COLPOSCOPY  3/17/16    GYN      Csection X2    HEENT      DENTAL       Allergies:  Allergies   Allergen Reactions    Hydrocodone Shortness Of Breath and Dizziness or Vertigo    Omeprazole Anaphylaxis and Hives    Adhesive Tape Rash       Home Medications:  Prior to Admission Medications   Prescriptions Last Dose Informant Patient Reported? Taking?   Multiple Vitamin (MULTIVITAMIN PO) Past Month  Yes Yes   Sig: Take by mouth   levothyroxine (SYNTHROID) 137 MCG tablet 10/8/2024  No Yes   Sig: Take 1 tablet by mouth daily      Facility-Administered Medications: None       Hospital Medications:  Current Facility-Administered Medications   Medication Dose Route Frequency    0.9 % sodium chloride infusion   IntraVENous Continuous    sodium chloride flush 0.9 % injection 5-40 mL  5-40 mL IntraVENous 2 times per day    sodium chloride flush 0.9 % injection 5-40 mL  5-40 mL IntraVENous PRN    0.9 % sodium chloride infusion  25 mL IntraVENous PRN       Social History:  Social History     Tobacco Use    Smoking status: Never    Smokeless tobacco: Never   Substance Use Topics    Alcohol use: No       Family History:  Family History

## 2024-11-10 DIAGNOSIS — E03.9 ACQUIRED HYPOTHYROIDISM: ICD-10-CM

## 2024-11-11 RX ORDER — LEVOTHYROXINE SODIUM 137 UG/1
137 TABLET ORAL DAILY
Qty: 30 TABLET | Refills: 0 | Status: SHIPPED | OUTPATIENT
Start: 2024-11-11 | End: 2024-11-15 | Stop reason: SDUPTHER

## 2024-11-14 ENCOUNTER — LAB (OUTPATIENT)
Age: 49
End: 2024-11-14

## 2024-11-14 DIAGNOSIS — E03.9 ACQUIRED HYPOTHYROIDISM: ICD-10-CM

## 2024-11-15 DIAGNOSIS — E03.9 ACQUIRED HYPOTHYROIDISM: ICD-10-CM

## 2024-11-15 LAB — TSH SERPL DL<=0.005 MIU/L-ACNC: 1.22 UIU/ML (ref 0.45–4.5)

## 2024-11-15 RX ORDER — LEVOTHYROXINE SODIUM 137 UG/1
137 TABLET ORAL DAILY
Qty: 90 TABLET | Refills: 1 | Status: SHIPPED | OUTPATIENT
Start: 2024-11-15

## 2025-06-13 DIAGNOSIS — E03.9 ACQUIRED HYPOTHYROIDISM: ICD-10-CM

## 2025-06-16 RX ORDER — LEVOTHYROXINE SODIUM 137 UG/1
137 TABLET ORAL DAILY
Qty: 30 TABLET | Refills: 0 | Status: SHIPPED | OUTPATIENT
Start: 2025-06-16

## 2025-06-27 SDOH — ECONOMIC STABILITY: FOOD INSECURITY: WITHIN THE PAST 12 MONTHS, YOU WORRIED THAT YOUR FOOD WOULD RUN OUT BEFORE YOU GOT MONEY TO BUY MORE.: NEVER TRUE

## 2025-06-27 SDOH — ECONOMIC STABILITY: INCOME INSECURITY: IN THE LAST 12 MONTHS, WAS THERE A TIME WHEN YOU WERE NOT ABLE TO PAY THE MORTGAGE OR RENT ON TIME?: NO

## 2025-06-27 SDOH — ECONOMIC STABILITY: FOOD INSECURITY: WITHIN THE PAST 12 MONTHS, THE FOOD YOU BOUGHT JUST DIDN'T LAST AND YOU DIDN'T HAVE MONEY TO GET MORE.: NEVER TRUE

## 2025-06-27 SDOH — ECONOMIC STABILITY: TRANSPORTATION INSECURITY
IN THE PAST 12 MONTHS, HAS THE LACK OF TRANSPORTATION KEPT YOU FROM MEDICAL APPOINTMENTS OR FROM GETTING MEDICATIONS?: NO

## 2025-06-30 ENCOUNTER — OFFICE VISIT (OUTPATIENT)
Age: 50
End: 2025-06-30
Payer: MEDICAID

## 2025-06-30 VITALS
WEIGHT: 268.3 LBS | HEART RATE: 83 BPM | SYSTOLIC BLOOD PRESSURE: 129 MMHG | BODY MASS INDEX: 42.11 KG/M2 | TEMPERATURE: 97.9 F | RESPIRATION RATE: 18 BRPM | HEIGHT: 67 IN | OXYGEN SATURATION: 98 % | DIASTOLIC BLOOD PRESSURE: 88 MMHG

## 2025-06-30 DIAGNOSIS — E03.9 ACQUIRED HYPOTHYROIDISM: ICD-10-CM

## 2025-06-30 DIAGNOSIS — Z86.2 HISTORY OF IRON DEFICIENCY ANEMIA: ICD-10-CM

## 2025-06-30 DIAGNOSIS — Z00.00 ANNUAL PHYSICAL EXAM: Primary | ICD-10-CM

## 2025-06-30 DIAGNOSIS — E66.01 MORBID OBESITY WITH BMI OF 40.0-44.9, ADULT (HCC): ICD-10-CM

## 2025-06-30 PROCEDURE — 99396 PREV VISIT EST AGE 40-64: CPT

## 2025-06-30 ASSESSMENT — PATIENT HEALTH QUESTIONNAIRE - PHQ9
SUM OF ALL RESPONSES TO PHQ QUESTIONS 1-9: 3
10. IF YOU CHECKED OFF ANY PROBLEMS, HOW DIFFICULT HAVE THESE PROBLEMS MADE IT FOR YOU TO DO YOUR WORK, TAKE CARE OF THINGS AT HOME, OR GET ALONG WITH OTHER PEOPLE: SOMEWHAT DIFFICULT
8. MOVING OR SPEAKING SO SLOWLY THAT OTHER PEOPLE COULD HAVE NOTICED. OR THE OPPOSITE, BEING SO FIGETY OR RESTLESS THAT YOU HAVE BEEN MOVING AROUND A LOT MORE THAN USUAL: NOT AT ALL
6. FEELING BAD ABOUT YOURSELF - OR THAT YOU ARE A FAILURE OR HAVE LET YOURSELF OR YOUR FAMILY DOWN: NOT AT ALL
SUM OF ALL RESPONSES TO PHQ QUESTIONS 1-9: 3
3. TROUBLE FALLING OR STAYING ASLEEP: NOT AT ALL
4. FEELING TIRED OR HAVING LITTLE ENERGY: SEVERAL DAYS
5. POOR APPETITE OR OVEREATING: NOT AT ALL
9. THOUGHTS THAT YOU WOULD BE BETTER OFF DEAD, OR OF HURTING YOURSELF: NOT AT ALL
SUM OF ALL RESPONSES TO PHQ QUESTIONS 1-9: 3
SUM OF ALL RESPONSES TO PHQ QUESTIONS 1-9: 3
7. TROUBLE CONCENTRATING ON THINGS, SUCH AS READING THE NEWSPAPER OR WATCHING TELEVISION: MORE THAN HALF THE DAYS
1. LITTLE INTEREST OR PLEASURE IN DOING THINGS: NOT AT ALL
2. FEELING DOWN, DEPRESSED OR HOPELESS: NOT AT ALL

## 2025-06-30 NOTE — PROGRESS NOTES
Identified pt with two pt identifiers(name and ). Reviewed record in preparation for visit and have obtained necessary documentation.  Chief Complaint   Patient presents with    Follow-up Chronic Condition        Health Maintenance Due   Topic    Depression Screen     Hepatitis B vaccine (1 of 3 - + 3-dose series)    COVID-19 Vaccine ( season)       Vitals:    25 1524   BP: 129/88   BP Site: Right Upper Arm   Patient Position: Sitting   BP Cuff Size: Medium Adult   Pulse: 83   Resp: 18   Temp: 97.9 °F (36.6 °C)   TempSrc: Oral   SpO2: 98%   Weight: 121.7 kg (268 lb 4.8 oz)   Height: 1.702 m (5' 7\")         \"Have you been to the ER, urgent care clinic since your last visit?  Hospitalized since your last visit?\"    YES - When: approximately 1 months ago.  Where and Why: [facility] and right ankle.    “Have you seen or consulted any other health care providers outside of [facility] since your last visit?”    NO            Click Here for Release of Records Request     This patient is accompanied in the office by her self.  I have received verbal consent from Margarita Infante to discuss any/all medical information while they are present in the room.

## 2025-06-30 NOTE — PROGRESS NOTES
[facility]         Margarita Infante is a 49 y.o. female  Presenting for her annual checkup + acute/chronic complaints as below    Hypothyroidism   Compliant with synthroid 137 mcg qD, reports no side effects     Last mammogram : 08/06/2024 neg  Last PAP/pelvic : neg 10/2021, due 2026 OB/GYN name  Last colonoscopy : 10/09/2024 - had polyps   Depression: PHQ-9 Total Score: 3 (6/30/2025  3:47 PM)  Thoughts that you would be better off dead, or of hurting yourself in some way: 0 (6/30/2025  3:47 PM)    Health Maintenance   Topic Date Due    Hepatitis B vaccine (1 of 3 - 19+ 3-dose series) Never done    COVID-19 Vaccine (1 - 2024-25 season) Never done    Flu vaccine (Season Ended) 08/01/2025    DTaP/Tdap/Td vaccine (3 - Td or Tdap) 12/02/2025    Depression Screen  06/30/2026    Breast cancer screen  08/06/2026    Cervical cancer screen  10/22/2026    Lipids  05/23/2028    Colorectal Cancer Screen  10/09/2034    Hepatitis C screen  Completed    HIV screen  Completed    Hepatitis A vaccine  Aged Out    Hib vaccine  Aged Out    Polio vaccine  Aged Out    Meningococcal (ACWY) vaccine  Aged Out    Meningococcal B vaccine  Aged Out    Pneumococcal 0-49 years Vaccine  Aged Out       Exercise: hiking  Diet: balanced diet  Alcohol: none   Tobacco use: none  Tobacco use history: none   Recreational drug use: none    Vaccinations reviewed  Immunization History   Administered Date(s) Administered    Influenza, FLUARIX, FLULAVAL, FLUZONE (age 6 mo+) and AFLURIA, (age 3 y+), Quadv PF, 0.5mL 09/30/2015    TDaP, ADACEL (age 10y-64y), BOOSTRIX (age 10y+), IM, 0.5mL 11/01/2012, 12/02/2015       Allergies: Hydrocodone, Omeprazole, and Adhesive tape  Current Outpatient Medications   Medication Sig    levothyroxine (SYNTHROID) 137 MCG tablet TAKE 1 TABLET BY MOUTH EVERY DAY    Multiple Vitamin (MULTIVITAMIN PO) Take by mouth     No current facility-administered medications for

## 2025-07-01 LAB
BUN SERPL-MCNC: 5 MG/DL (ref 6–24)
BUN/CREAT SERPL: 6 (ref 9–23)
CALCIUM SERPL-MCNC: 9.7 MG/DL (ref 8.7–10.2)
CHLORIDE SERPL-SCNC: 103 MMOL/L (ref 96–106)
CO2 SERPL-SCNC: 17 MMOL/L (ref 20–29)
CREAT SERPL-MCNC: 0.84 MG/DL (ref 0.57–1)
EGFRCR SERPLBLD CKD-EPI 2021: 86 ML/MIN/1.73
ERYTHROCYTE [DISTWIDTH] IN BLOOD BY AUTOMATED COUNT: 14.6 % (ref 11.7–15.4)
GLUCOSE SERPL-MCNC: 80 MG/DL (ref 70–99)
HCT VFR BLD AUTO: 41.2 % (ref 34–46.6)
HGB BLD-MCNC: 13 G/DL (ref 11.1–15.9)
MCH RBC QN AUTO: 26.6 PG (ref 26.6–33)
MCHC RBC AUTO-ENTMCNC: 31.6 G/DL (ref 31.5–35.7)
MCV RBC AUTO: 84 FL (ref 79–97)
PLATELET # BLD AUTO: 247 X10E3/UL (ref 150–450)
POTASSIUM SERPL-SCNC: 4.3 MMOL/L (ref 3.5–5.2)
RBC # BLD AUTO: 4.89 X10E6/UL (ref 3.77–5.28)
SODIUM SERPL-SCNC: 139 MMOL/L (ref 134–144)
TSH SERPL DL<=0.005 MIU/L-ACNC: 1.93 UIU/ML (ref 0.45–4.5)
WBC # BLD AUTO: 6.3 X10E3/UL (ref 3.4–10.8)

## 2025-07-03 ENCOUNTER — RESULTS FOLLOW-UP (OUTPATIENT)
Age: 50
End: 2025-07-03

## 2025-07-28 DIAGNOSIS — E03.9 ACQUIRED HYPOTHYROIDISM: ICD-10-CM

## 2025-07-28 RX ORDER — LEVOTHYROXINE SODIUM 137 UG/1
137 TABLET ORAL DAILY
Qty: 90 TABLET | Refills: 3 | Status: SHIPPED | OUTPATIENT
Start: 2025-07-28

## 2025-07-28 NOTE — PROGRESS NOTES
[facility]     Prescription for year supply of synthroid 137mcg sent.     Amarilys Klein MD

## (undated) DEVICE — BLADELESS OPTICAL TROCAR WITH FIXATION CANNULA: Brand: VERSAPORT

## (undated) DEVICE — DRAPE SURG EQUIP W105XH13XL20IN 3 ARM DISPOSABLE DA VINCI S

## (undated) DEVICE — RETRIEVAL BALLOON CATHETER: Brand: EXTRACTOR™ PRO RX

## (undated) DEVICE — NEEDLE HYPO 22GA L1.5IN BLK S STL HUB POLYPR SHLD REG BVL

## (undated) DEVICE — KENDALL SCD EXPRESS SLEEVES, KNEE LENGTH, MEDIUM: Brand: KENDALL SCD

## (undated) DEVICE — CLIP LIG ABSRB HEM-LOK LG PUR --

## (undated) DEVICE — FORCEP BX 2.2 MMX240 CM CHANNEL SERRATED RADIAL JAW 4

## (undated) DEVICE — TOWEL SURG W17XL27IN STD BLU COT NONFENESTRATED PREWASHED

## (undated) DEVICE — SURGICAL PROCEDURE KIT GEN LAPAROSCOPY LF

## (undated) DEVICE — Device

## (undated) DEVICE — 1200 GUARD II KIT W/5MM TUBE W/O VAC TUBE: Brand: GUARDIAN

## (undated) DEVICE — STERILE POLYISOPRENE POWDER-FREE SURGICAL GLOVES: Brand: PROTEXIS

## (undated) DEVICE — DEVICE TRNSF SPIK STL 2008S] MICROTEK MEDICAL INC]

## (undated) DEVICE — DRAPE,REIN 53X77,STERILE: Brand: MEDLINE

## (undated) DEVICE — REM POLYHESIVE ADULT PATIENT RETURN ELECTRODE: Brand: VALLEYLAB

## (undated) DEVICE — DERMABOND SKIN ADH 0.7ML -- DERMABOND ADVANCED 12/BX

## (undated) DEVICE — OBTRTR BLDELSS 8MM DISP -- DA VINCI - SNGL USE

## (undated) DEVICE — MEDI-VAC NON-CONDUCTIVE SUCTION TUBING: Brand: CARDINAL HEALTH

## (undated) DEVICE — INFECTION CONTROL KIT SYS

## (undated) DEVICE — ELECTRO LUBE IS A SINGLE PATIENT USE DEVICE THAT IS INTENDED TO BE USED ON ELECTROSURGICAL ELECTRODES TO REDUCE STICKING.: Brand: KEY SURGICAL ELECTRO LUBE

## (undated) DEVICE — ENDO CARRY-ON PROCEDURE KIT INCLUDES ENZYMATIC SPONGE, GAUZE, BIOHAZARD LABEL, TRAY, LUBRICANT, DIRTY SCOPE LABEL, WATER LABEL, TRAY, DRAWSTRING PAD, AND DEFENDO 4-PIECE KIT.: Brand: ENDO CARRY-ON PROCEDURE KIT

## (undated) DEVICE — DEVICE LCK BILI RAP EXCHG OLPS --

## (undated) DEVICE — CARTRIDGE CLP LIG HEMLOK GRN --

## (undated) DEVICE — COVER LT HNDL BLU PLAS

## (undated) DEVICE — INSUFFLATION NEEDLE: Brand: SURGINEEDLE

## (undated) DEVICE — POSITIONER HD REST SUPINE LAT

## (undated) DEVICE — SUTURE MCRYL SZ 4-0 L27IN ABSRB UD L19MM PS-2 1/2 CIR PRIM Y426H

## (undated) DEVICE — ELECTRODE PT RET AD L9FT HI MOIST COND ADH HYDRGEL CORDED

## (undated) DEVICE — BLADELESS OPTICAL TROCAR WITH FIXATION CANNULA: Brand: VERSAONE

## (undated) DEVICE — (D)PREP SKN CHLRAPRP APPL 26ML -- CONVERT TO ITEM 371833

## (undated) DEVICE — CORD ELECSURG BPLR 12 FT DISP [810T818750] [ADLER INSTRUMENT CO]

## (undated) DEVICE — ADULT SPO2 SENSOR: Brand: NELLCOR

## (undated) DEVICE — 3M™ CUROS™ DISINFECTING CAP FOR NEEDLELESS CONNECTORS 270/CARTON 20 CARTONS/CASE CFF1-270: Brand: CUROS™

## (undated) DEVICE — SUTURE PDS II SZ 0 L27IN ABSRB VLT L26MM CT-2 1/2 CIR Z334H

## (undated) DEVICE — VISUALIZATION SYSTEM: Brand: CLEARIFY

## (undated) DEVICE — TIP COVER ACCESSORY

## (undated) DEVICE — SUPPLEMENT DIGESTIVE H2O SOL GI-EASE

## (undated) DEVICE — BLOCK BITE BLOX W DENTAL RIM --

## (undated) DEVICE — DEVON™ KNEE AND BODY STRAP 60" X 3" (1.5 M X 7.6 CM): Brand: DEVON

## (undated) DEVICE — SPECIMEN RETRIEVAL POUCH: Brand: ENDO CATCH GOLD

## (undated) DEVICE — TUBING INSUF HEAT STRL 10 FT --

## (undated) DEVICE — COVER,TABLE,60X90,STERILE: Brand: MEDLINE